# Patient Record
Sex: MALE | Race: WHITE | NOT HISPANIC OR LATINO | Employment: FULL TIME | URBAN - NONMETROPOLITAN AREA
[De-identification: names, ages, dates, MRNs, and addresses within clinical notes are randomized per-mention and may not be internally consistent; named-entity substitution may affect disease eponyms.]

---

## 2017-03-01 ENCOUNTER — OFFICE VISIT (OUTPATIENT)
Dept: FAMILY MEDICINE | Facility: OTHER | Age: 62
End: 2017-03-01
Attending: FAMILY MEDICINE
Payer: COMMERCIAL

## 2017-03-01 VITALS
SYSTOLIC BLOOD PRESSURE: 126 MMHG | TEMPERATURE: 99.4 F | BODY MASS INDEX: 28.35 KG/M2 | OXYGEN SATURATION: 95 % | WEIGHT: 198 LBS | HEART RATE: 76 BPM | RESPIRATION RATE: 16 BRPM | HEIGHT: 70 IN | DIASTOLIC BLOOD PRESSURE: 66 MMHG

## 2017-03-01 DIAGNOSIS — Z87.438 HISTORY OF ACUTE PROSTATITIS: ICD-10-CM

## 2017-03-01 DIAGNOSIS — F43.0 STRESS REACTION: ICD-10-CM

## 2017-03-01 DIAGNOSIS — Z71.89 ACP (ADVANCE CARE PLANNING): Chronic | ICD-10-CM

## 2017-03-01 DIAGNOSIS — Z00.00 ROUTINE GENERAL MEDICAL EXAMINATION AT A HEALTH CARE FACILITY: Primary | ICD-10-CM

## 2017-03-01 LAB
ANION GAP SERPL CALCULATED.3IONS-SCNC: 8 MMOL/L (ref 3–14)
BUN SERPL-MCNC: 19 MG/DL (ref 7–30)
CALCIUM SERPL-MCNC: 8.2 MG/DL (ref 8.5–10.1)
CHLORIDE SERPL-SCNC: 108 MMOL/L (ref 94–109)
CHOLEST SERPL-MCNC: 117 MG/DL
CO2 SERPL-SCNC: 25 MMOL/L (ref 20–32)
CREAT SERPL-MCNC: 1.04 MG/DL (ref 0.66–1.25)
GFR SERPL CREATININE-BSD FRML MDRD: 72 ML/MIN/1.7M2
GLUCOSE SERPL-MCNC: 94 MG/DL (ref 70–99)
HDLC SERPL-MCNC: 44 MG/DL
LDLC SERPL CALC-MCNC: 41 MG/DL
NONHDLC SERPL-MCNC: 73 MG/DL
POTASSIUM SERPL-SCNC: 3.9 MMOL/L (ref 3.4–5.3)
PSA SERPL-MCNC: 0.61 UG/L (ref 0–4)
SODIUM SERPL-SCNC: 141 MMOL/L (ref 133–144)
TRIGL SERPL-MCNC: 162 MG/DL

## 2017-03-01 PROCEDURE — 80061 LIPID PANEL: CPT | Performed by: FAMILY MEDICINE

## 2017-03-01 PROCEDURE — 84153 ASSAY OF PSA TOTAL: CPT | Performed by: FAMILY MEDICINE

## 2017-03-01 PROCEDURE — 99396 PREV VISIT EST AGE 40-64: CPT | Performed by: FAMILY MEDICINE

## 2017-03-01 PROCEDURE — 36415 COLL VENOUS BLD VENIPUNCTURE: CPT | Performed by: FAMILY MEDICINE

## 2017-03-01 PROCEDURE — 80048 BASIC METABOLIC PNL TOTAL CA: CPT | Performed by: FAMILY MEDICINE

## 2017-03-01 ASSESSMENT — PAIN SCALES - GENERAL: PAINLEVEL: NO PAIN (0)

## 2017-03-01 NOTE — MR AVS SNAPSHOT
"              After Visit Summary   3/1/2017    Samy Lu    MRN: [id number]           Patient Information     Date Of Birth          1955        Visit Information        Provider Department      3/1/2017 1:45 PM Reji Flynn MD [facility] ([facility])        Today's Diagnoses     Routine general medical examination at a health care facility    -  1    History of acute prostatitis        Stress reaction        ACP (advance care planning)          Care Instructions    F/u annually.          Follow-ups after your visit        Who to contact     If you have questions or need follow up information about today's clinic visit or your schedule please contact [facility] directly at [phone].  Normal or non-critical lab and imaging results will be communicated to you by MyChart, letter or phone within 4 business days after the clinic has received the results. If you do not hear from us within 7 days, please contact the clinic through MyChart or phone. If you have a critical or abnormal lab result, we will notify you by phone as soon as possible.  Submit refill requests through LookTracker or call your pharmacy and they will forward the refill request to us. Please allow 3 business days for your refill to be completed.          Additional Information About Your Visit        Care EveryWhere ID     This is your Care EveryWhere ID. This could be used by other organizations to access your [place] medical records  [id number]        Your Vitals Were     Pulse Temperature Respirations Height Pulse Oximetry BMI (Body Mass Index)    76 99.4  F (37.4  C) (Tympanic) 16 5' 9.5\" (1.765 m) 95% 28.82 kg/m2       Blood Pressure from Last 3 Encounters:   03/01/17 126/66   08/25/15 111/70   04/08/15 112/66    Weight from Last 3 Encounters:   03/01/17 198 lb (89.8 kg)   08/25/15 197 lb (89.4 kg)   04/08/15 195 lb (88.5 kg)              We Performed the Following     Basic metabolic panel     Lipid Profile " (Chol, Trig, HDL, LDL calc)     PSA Diagnostic        Primary Care Provider Office Phone # Fax #    Arvind Reilly [phone]       [facility] [address] [place] [address]        Thank you!     Thank you for choosing [facility]  for your care. Our goal is always to provide you with excellent care. Hearing back from our patients is one way we can continue to improve our services. Please take a few minutes to complete the written survey that you may receive in the mail after your visit with us. Thank you!             Your Updated Medication List - Protect others around you: Learn how to safely use, store and throw away your medicines at www.disposemymeds.org.          This list is accurate as of: 3/1/17  2:04 PM.  Always use your most recent med list.                   Brand Name Dispense Instructions for use    ZINC PO      Take by mouth as needed

## 2017-03-01 NOTE — LETTER
3/2/2017     Samy Lu  [address] [address]      Dear Samy:    Thank you for allowing me to participate in your care. Your recent test results were reviewed and listed below. Your labs all great!  We should recheck them in a year, except the psa which doesn't need to be checked again.      Your results are provided below for your review  Results for orders placed or performed in visit on 03/01/17   PSA Diagnostic   Result Value Ref [place]    PSA 0.61 0 - 4 ug/L   Lipid Profile (Chol, Trig, HDL, LDL calc)   Result Value Ref [place]    Cholesterol 117 <200 mg/dL    Triglycerides 162 (H) <150 mg/dL    HDL Cholesterol 44 >39 mg/dL    LDL Cholesterol Calculated 41 <100 mg/dL    Non HDL Cholesterol 73 <130 mg/dL   Basic metabolic panel   Result Value Ref [place]    Sodium 141 133 - 144 mmol/L    Potassium 3.9 3.4 - 5.3 mmol/L    Chloride 108 94 - 109 mmol/L    Carbon Dioxide 25 20 - 32 mmol/L    Anion Gap 8 3 - 14 mmol/L    Glucose 94 70 - 99 mg/dL    Urea Nitrogen 19 7 - 30 mg/dL    Creatinine 1.04 0.66 - 1.25 mg/dL    GFR Estimate 72 >60 mL/min/1.7m2    GFR Estimate If Black 88 >60 mL/min/1.7m2    Calcium 8.2 (L) 8.5 - 10.1 mg/dL             Thank you for choosing [facility]. As a result, please continue with the treatment plan discussed in the office. Return as discussed or sooner if symptoms worsens or fail to improve. If you have any further questions or concerns, please do not hesitate to contact us.      Sincerely,    Dr SKYLER Flynn/Sadie TEJEDA [place] [address] [address]  [facility] [address]  Phone: [phone]

## 2017-03-01 NOTE — PROGRESS NOTES
Subjective:  Samy Lu is a 61 year old male who presents for routine cares.  He is having a great deal of stress with work.  See below.  Otherwise no new issues or concerns.      History reviewed. No pertinent past medical history.    Past Surgical History   Procedure Laterality Date     Abdomen surgery       right IHR     Abdomen surgery       2 hernia repairs     Abdomen surgery       Umbilical hernia repair       Family History   Problem Relation Age of Onset     CANCER Father      Non Hodgkins Lymphoma     DIABETES Paternal Grandmother        Social History   Substance Use Topics     Smoking status: Former Smoker     Types: Cigarettes     Smokeless tobacco: Not on file     Alcohol use Not on file       Current Outpatient Prescriptions   Medication     Multiple Vitamins-Minerals (ZINC PO)     No current facility-administered medications for this visit.        No Known Allergies    Review of Systems:  Gen: negative for fever, chills, change in weight  Derm: negative for worrisome rashes, moles or lesions  Eyes: negative for vision changes or irritation  ENT: negative for ear, mouth and throat problems  Resp: negative for significant cough or SOB  Breast: negative for masses, tenderness or discharge  CV: negative for chest pain, palpitations or peripheral edema  GI: negative for nausea, abdominal pain, heartburn, or change in bowel habits  : negative for frequency, dysuria, or hematuria  Musculoskeletal: negative for significant arthralgias or myalgia  Neuro: negative for weakness, dizziness or paresthesias  Endo: negative for temperature intolerance, skin/hair changes  Heme: negative for bleeding problems  Psych: negative for changes in mood or affect    Objective:  B/P: 126/66, T: 99.4, P: 76, R: 16    Physical Exam:  Constitutional: healthy, alert and no distress  Head: Normocephalic. No masses, lesions, tenderness or abnormalities  ENT: ENT exam normal, no neck nodes or sinus tenderness  CV: RRR. No  murmurs, clicks gallops or rub  Pulm: Lungs clear to auscultation b/l, no rhonchi, rales or wheezes.  GI: Abdomen soft, non-tender. BS normal. No masses, organomegaly  : exam deferred.   Musculoskeletal: extremities normal- no gross deformities noted, gait normal and normal muscle tone  Skin: no suspicious lesions or rashes  Neuroc: Gait normal. Reflexes normal and symmetric. Sensation grossly WNL.  Psych: mentation appears normal and affect normal/bright  Heme: normal ant/post cervical, axillary, supraclavicular and inguinal nodes    Labs pending, see chart for results.    Assessment and Plan:  (Z00.00) Routine general medical examination at a health care facility  (primary encounter diagnosis)  Comment: doing well   Plan: Lipid Profile (Chol, Trig, HDL, LDL calc),         Basic metabolic panel        Update annual labs.  No change in cares.      (Z87.438) History of acute prostatitis  Comment: no recurrence of symptoms.   Plan: PSA Diagnostic        Discussed.  Updating one more psa.  He declines rectal exam.      (F43.0) Stress reaction  Comment: with work.   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  He has pain pill and alcohol issues, which is causing a great deal of stress.  Offer meds and counseling to Rocky, but he wants to work on this himself.  Will just wait to hear back from him.    .      Reji Flynn

## 2017-03-01 NOTE — NURSING NOTE
"Chief Complaint   Patient presents with     Physical     *_* Health Care Directive *_*     paperwork given       Initial /66 (BP Location: Right arm, Patient Position: Chair, Cuff Size: Adult Large)  Pulse 76  Temp 99.4  F (37.4  C) (Tympanic)  Resp 16  Ht 5' 9.5\" (1.765 m)  Wt 198 lb (89.8 kg)  SpO2 95%  BMI 28.82 kg/m2 Estimated body mass index is 28.82 kg/(m^2) as calculated from the following:    Height as of this encounter: 5' 9.5\" (1.765 m).    Weight as of this encounter: 198 lb (89.8 kg).  Medication Reconciliation: complete   Lo Ortega LPN      "

## 2017-03-02 ASSESSMENT — PATIENT HEALTH QUESTIONNAIRE - PHQ9: SUM OF ALL RESPONSES TO PHQ QUESTIONS 1-9: 12

## 2017-03-21 ENCOUNTER — OFFICE VISIT (OUTPATIENT)
Dept: FAMILY MEDICINE | Facility: OTHER | Age: 62
End: 2017-03-21
Attending: FAMILY MEDICINE
Payer: COMMERCIAL

## 2017-03-21 VITALS
RESPIRATION RATE: 16 BRPM | OXYGEN SATURATION: 96 % | HEART RATE: 62 BPM | DIASTOLIC BLOOD PRESSURE: 68 MMHG | TEMPERATURE: 97.6 F | BODY MASS INDEX: 28.35 KG/M2 | WEIGHT: 198 LBS | SYSTOLIC BLOOD PRESSURE: 106 MMHG | HEIGHT: 70 IN

## 2017-03-21 DIAGNOSIS — F43.9 STRESS: Primary | ICD-10-CM

## 2017-03-21 PROCEDURE — 99213 OFFICE O/P EST LOW 20 MIN: CPT | Performed by: FAMILY MEDICINE

## 2017-03-21 ASSESSMENT — PAIN SCALES - GENERAL: PAINLEVEL: NO PAIN (0)

## 2017-03-21 NOTE — MR AVS SNAPSHOT
After Visit Summary   3/21/2017    Samy Lu    MRN: [id number]           Patient Information     Date Of Birth          1955        Visit Information        Provider Department      3/21/2017 9:15 AM Reji Flynn MD [facility]        Today's Diagnoses     Stress    -  1      Care Instructions    F/u with ongoing concerns.         Follow-ups after your visit        Additional Services     PSYCHOLOGY REFERRAL       Your provider has referred you to:  [facility].     Please be aware that coverage of these services is subject to the terms and limitations of your health insurance plan.  Call member services at your health plan with any benefit or coverage questions.      Please bring the following to your appointment:    >>   Any x-rays, CTs or MRIs which have been performed.  Contact the facility where they were done to arrange for  prior to your scheduled appointment.   >>   List of current medications   >>   This referral request   >>   Any documents/labs given to you for this referral                  Your next 10 appointments already scheduled     Mar 21, 2017  9:15 AM CDT   (Arrive by 9:00 AM)   SHORT with Reji Flynn MD   [facility] ([facility])    [address] [place] [address]   [phone]              Who to contact     If you have questions or need follow up information about today's clinic visit or your schedule please contact [facility] directly at [phone].  Normal or non-critical lab and imaging results will be communicated to you by MyChart, letter or phone within 4 business days after the clinic has received the results. If you do not hear from us within 7 days, please contact the clinic through MyChart or phone. If you have a critical or abnormal lab result, we will notify you by phone as soon as possible.  Submit refill requests through [email or web address] or call your pharmacy and they will forward  "the refill request to us. Please allow 3 business days for your refill to be completed.          Additional Information About Your Visit        Care EveryWhere ID     This is your Care EveryWhere ID. This could be used by other organizations to access your [place] medical records  [id number]        Your Vitals Were     Pulse Temperature Respirations Height Pulse Oximetry BMI (Body Mass Index)    62 97.6  F (36.4  C) (Tympanic) 16 5' 9.5\" (1.765 m) 96% 28.82 kg/m2       Blood Pressure from Last 3 Encounters:   03/21/17 106/68   03/01/17 126/66   08/25/15 111/70    Weight from Last 3 Encounters:   03/21/17 198 lb (89.8 kg)   03/01/17 198 lb (89.8 kg)   08/25/15 197 lb (89.4 kg)              We Performed the Following     PSYCHOLOGY REFERRAL        Primary Care Provider Office Phone # Fax #    Reji Flynn [phone]       [address] [address]        Thank you!     Thank you for choosing [facility]  for your care. Our goal is always to provide you with excellent care. Hearing back from our patients is one way we can continue to improve our services. Please take a few minutes to complete the written survey that you may receive in the mail after your visit with us. Thank you!             Your Updated Medication List - Protect others around you: Learn how to safely use, store and throw away your medicines at www.disposemymeds.org.          This list is accurate as of: 3/21/17  9:13 AM.  Always use your most recent med list.                   Brand Name Dispense Instructions for use    ZINC PO      Take by mouth as needed         "

## 2017-03-21 NOTE — PROGRESS NOTES
Samy Lu    March 21, 2017    Chief Complaint   Patient presents with     Referral     wants to talk about referral for mental health       SUBJECTIVE:  Here for increased stress.  Now having more bm's with stressful thoughts.  He wants to address the stress.  He is very stressed at work.      History reviewed. No pertinent past medical history.    Past Surgical History   Procedure Laterality Date     Abdomen surgery       right IHR     Abdomen surgery       2 hernia repairs     Abdomen surgery       Umbilical hernia repair       Current Outpatient Prescriptions   Medication Sig Dispense Refill     Multiple Vitamins-Minerals (ZINC PO) Take by mouth as needed         No Known Allergies    Family History   Problem Relation Age of Onset     CANCER Father      Non Hodgkins Lymphoma     DIABETES Paternal Grandmother        Social History     Social History     Marital status:      Spouse name: N/A     Number of children: N/A     Years of education: N/A     Occupational History     Not on file.     Social History Main Topics     Smoking status: Former Smoker     Types: Cigarettes     Smokeless tobacco: Not on file     Alcohol use Not on file     Drug use: Not on file     Sexual activity: Not on file     Other Topics Concern     Parent/Sibling W/ Cabg, Mi Or Angioplasty Before 65f 55m? No     Social History Narrative       5 point ROS negative except as noted above in HPI, including Gen., Resp., CV, GI &  system review.     OBJECTIVE:  B/P: 106/68, T: 97.6, P: 62, R: 16    GENERAL APPEARANCE: Alert, no acute distress  Mood:  Appropriate.   SKIN: no suspicious lesions or rashes to visualized skin  NEURO: Alert, oriented x 3, speech and mentation normal    ASSESSMENT and PLAN:  (F43.9) Stress  (primary encounter diagnosis)  Comment: increase.  Now having some somatic sx.    Plan: PSYCHOLOGY REFERRAL        Discussed.  He does not want meds.  Referral to [facility].  F/u with ongoing concerns.

## 2017-03-21 NOTE — NURSING NOTE
"Chief Complaint   Patient presents with     Referral     wants to talk about referral for mental health       Initial /68 (BP Location: Left arm, Patient Position: Chair, Cuff Size: Adult Large)  Pulse 62  Temp 97.6  F (36.4  C) (Tympanic)  Resp 16  Ht 5' 9.5\" (1.765 m)  Wt 198 lb (89.8 kg)  SpO2 96%  BMI 28.82 kg/m2 Estimated body mass index is 28.82 kg/(m^2) as calculated from the following:    Height as of this encounter: 5' 9.5\" (1.765 m).    Weight as of this encounter: 198 lb (89.8 kg).  Medication Reconciliation: complete   Lo Ortega LPN      "

## 2017-03-23 ENCOUNTER — TELEPHONE (OUTPATIENT)
Dept: FAMILY MEDICINE | Facility: OTHER | Age: 62
End: 2017-03-23

## 2017-03-23 NOTE — TELEPHONE ENCOUNTER
Pt calls [facility] is not in his network. He called his insurance they will cover Tony Bates and Trever Caruso. Have you any recommendation/preference for these providers?

## 2017-07-12 ENCOUNTER — OFFICE VISIT (OUTPATIENT)
Dept: FAMILY MEDICINE | Facility: OTHER | Age: 62
End: 2017-07-12
Attending: FAMILY MEDICINE
Payer: OTHER MISCELLANEOUS

## 2017-07-12 VITALS
HEIGHT: 70 IN | HEART RATE: 68 BPM | SYSTOLIC BLOOD PRESSURE: 122 MMHG | BODY MASS INDEX: 28.63 KG/M2 | TEMPERATURE: 96.6 F | OXYGEN SATURATION: 96 % | RESPIRATION RATE: 18 BRPM | DIASTOLIC BLOOD PRESSURE: 70 MMHG | WEIGHT: 200 LBS

## 2017-07-12 DIAGNOSIS — S39.012A STRAIN OF LUMBAR REGION, INITIAL ENCOUNTER: Primary | ICD-10-CM

## 2017-07-12 PROCEDURE — 99213 OFFICE O/P EST LOW 20 MIN: CPT | Performed by: FAMILY MEDICINE

## 2017-07-12 PROCEDURE — 72100 X-RAY EXAM L-S SPINE 2/3 VWS: CPT | Mod: TC | Performed by: RADIOLOGY

## 2017-07-12 ASSESSMENT — PAIN SCALES - GENERAL: PAINLEVEL: NO PAIN (0)

## 2017-07-12 NOTE — NURSING NOTE
"Chief Complaint   Patient presents with     Back Pain     work comp, date of injury 5/16/2017       Initial /70 (BP Location: Left arm, Patient Position: Chair, Cuff Size: Adult Regular)  Pulse 68  Temp 96.6  F (35.9  C) (Tympanic)  Resp 18  Ht 5' 9.5\" (1.765 m)  Wt 200 lb (90.7 kg)  SpO2 96%  BMI 29.11 kg/m2 Estimated body mass index is 29.11 kg/(m^2) as calculated from the following:    Height as of this encounter: 5' 9.5\" (1.765 m).    Weight as of this encounter: 200 lb (90.7 kg).  Medication Reconciliation: phil Lomas    "

## 2017-07-12 NOTE — PROGRESS NOTES
Occupational Visit     Subjective:  Samy Lu, 62 year old, male is seen today for work comp injury at work, low back.  The date of injury is May 16, 2017 he is fairly sure.  The patient is employed at [employer].  Patient was holding some steel parts in his hands.  There was a roller cart on the floor.  He opened the truck door and was going to throw the steel into the passenger side.  His foot caught the roller cart.  He fell forward but was able to catch himself and not fall or drop the steel, hurting his low back in the meantime.  He never missed any work.  His back has hurt the whole time but has progressively gotten worse.  He works as a manager, and they are so busy that he never felt he could take any time off but now the pain warrants him to have looked at.        No Known Allergies      Review of Systems:  No bowel or bladder dysfunction.  Gets some numbness down the left thigh for some time and now down the right thigh as well.  Nothing below the knee      OBJECTIVE:  Vitals: B/P: 122/70, T: 96.6, P: 68, R: 18      Exam:  DTR normal bilaterally in the patellar.  Strength in the dorsar plantar flexion is normal bilaterally.  Tender just to the left of the spine about about L4/5.       Labs:xray low back.       ASSESSMENT/PLAN:    (S39.012A) Strain of lumbar region, initial encounter  (primary encounter diagnosis)  Comment: reviewed.   Plan: XR LUMBAR SPINE 2/3 VIEWS (Clinic Performed),         PHYSICAL THERAPY REFERRAL        Discussed.  No red flags right now.  PT, xray given more than 6 weeks of symptoms.  F/u 4 weeks.  Light duty until then.

## 2017-07-12 NOTE — MR AVS SNAPSHOT
"              After Visit Summary   7/12/2017    Samy Lu    MRN: [id number]           Patient Information     Date Of Birth          1955        Visit Information        Provider Department      7/12/2017 10:00 AM Reji Flynn MD [facility]        Today's Diagnoses     Strain of lumbar region, initial encounter    -  1      Care Instructions    F/u with ongoing concerns.           Follow-ups after your visit        Additional Services     PHYSICAL THERAPY REFERRAL       *This therapy referral will be filtered to a centralized scheduling office at [facility] and the patient will receive a call to schedule an appointment at a [place] location most convenient for them. *     [facility] provides Physical Therapy evaluation and treatment and many specialty services across the [place] system.  If requesting a specialty program, please choose from the list below.    If you have not heard from the scheduling office within 2 business days, please call [phone] for all locations, with the exception of [place], please call [phone].  Treatment: Evaluation & Treatment  Special Instructions/Modalities: none  Special Programs: none    Please be aware that coverage of these services is subject to the terms and limitations of your health insurance plan.  Call member services at your health plan with any benefit or coverage questions.      **Note to Provider:  If you are referring outside of [place] for the therapy appointment, please list the name of the location in the \"special instructions\" above, print the referral and give to the patient to schedule the appointment.                  Who to contact     If you have questions or need follow up information about today's clinic visit or your schedule please contact [facility] directly at [phone].  Normal or non-critical lab and imaging results will be communicated to you by " "MyChart, letter or phone within 4 business days after the clinic has received the results. If you do not hear from us within 7 days, please contact the clinic through Best Teacher[employer] or phone. If you have a critical or abnormal lab result, we will notify you by phone as soon as possible.  Submit refill requests through [employer] ([employer]) or call your pharmacy and they will forward the refill request to us. Please allow 3 business days for your refill to be completed.          Additional Information About Your Visit        Best TeacherharRezzcard Information     [employer] ([employer]) lets you send messages to your doctor, view your test results, renew your prescriptions, schedule appointments and more. To sign up, go to [email or web address] ([email or web address]) . Click on \"Log in\" on the left side of the screen, which will take you to the Welcome page. Then click on \"Sign up Now\" on the right side of the page.     You will be asked to enter the access code listed below, as well as some personal information. Please follow the directions to create your username and password.     Your access code is: [email or web address]  Expires: 10/10/2017 12:37 PM     Your access code will  in 90 days. If you need help or a new code, please call your [place] clinic or [phone].        Care EveryWhere ID     This is your Care EveryWhere ID. This could be used by other organizations to access your [place] medical records  [id number]        Your Vitals Were     Pulse Temperature Respirations Height Pulse Oximetry BMI (Body Mass Index)    68 96.6  F (35.9  C) (Tympanic) 18 5' 9.5\" (1.765 m) 96% 29.11 kg/m2       Blood Pressure from Last 3 Encounters:   17 122/70   17 106/68   17 126/66    Weight from Last 3 Encounters:   17 200 lb (90.7 kg)   17 198 lb (89.8 kg)   17 198 lb (89.8 kg)              We Performed the Following     PHYSICAL THERAPY REFERRAL     XR LUMBAR SPINE 2/3 VIEWS (Clinic Performed)        Primary Care Provider Office Phone # Fax #    Reji DALEY" MD [facility] [phone]        [facility] [address]  [facility] [address]        Equal Access to Services     YESSENIA LINDQUIST : Fior Sue, francisco vargas, johnna kaalmada robinmauricioino, nicho muñoz shayleeburke pardolora whitman albert glasgow. So [facility] [phone].    ATENCIÓN: Si habla español, tiene a pickering disposición servicios gratuitos de asistencia lingüística. Llame al [phone].    We comply with applicable federal civil rights laws and [place] laws. We do not discriminate on the basis of race, color, national origin, age, disability sex, sexual orientation or gender identity.            Thank you!     Thank you for choosing [facility]  for your care. Our goal is always to provide you with excellent care. Hearing back from our patients is one way we can continue to improve our services. Please take a few minutes to complete the written survey that you may receive in the mail after your visit with us. Thank you!             Your Updated Medication List - Protect others around you: Learn how to safely use, store and throw away your medicines at www.disposemymeds.org.          This list is accurate as of: 7/12/17 12:37 PM.  Always use your most recent med list.                   Brand Name Dispense Instructions for use Diagnosis    ZINC PO      Take by mouth as needed

## 2017-07-31 ENCOUNTER — HOSPITAL ENCOUNTER (OUTPATIENT)
Dept: PHYSICAL THERAPY | Facility: HOSPITAL | Age: 62
Setting detail: THERAPIES SERIES
End: 2017-07-31
Attending: FAMILY MEDICINE
Payer: OTHER MISCELLANEOUS

## 2017-07-31 PROCEDURE — 97140 MANUAL THERAPY 1/> REGIONS: CPT | Mod: GP | Performed by: PHYSICAL THERAPIST

## 2017-07-31 PROCEDURE — 97161 PT EVAL LOW COMPLEX 20 MIN: CPT | Mod: GP | Performed by: PHYSICAL THERAPIST

## 2017-07-31 NOTE — PROGRESS NOTES
07/31/17 0800   General Information   Type of Visit Initial OP Ortho PT Evaluation   Start of Care Date 07/31/17   Referring Physician Dr Flynn   Patient/Family Goals Statement return to normal work actvitities   Orders Evaluate and Treat   Date of Order 07/12/17   Insurance Type ()   Medical Diagnosis Back strain   Surgical/Medical history reviewed Yes   Precautions/Limitations no known precautions/limitations   General Information Comments Currently light duty   Body Part(s)   Body Part(s) Lumbar Spine/SI   Presentation and Etiology   Pertinent history of current problem (include personal factors and/or comorbidities that impact the POC) Patient injured his back at work on 5/16/2017 after his foot caught under a roller and he stumbled while carrying steel. He did not drop the steel, but noticed onset of pain in his lower back that has failed to go away the past 2 months. States he has pain into his L leg to his knee. He has a hard time sitting for a long period of time   Impairments A. Pain;D. Decreased ROM;E. Decreased flexibility;F. Decreased strength and endurance;H. Impaired gait   Functional Limitations perform activities of daily living;perform required work activities   Symptom Location LB and L leg   How/Where did it occur At work   Onset date of current episode/exacerbation 07/12/17   Chronicity New   Pain rating (0-10 point scale) Best (/10);Worst (/10)   Best (/10) 5   Worst (/10) 7   Pain quality C. Aching;D. Burning   Frequency of pain/symptoms B. Intermittent   Pain/symptoms are: Worse during the day   Pain/symptoms exacerbated by A. Sitting;C. Lifting;D. Carrying;K. Home tasks;L. Work tasks   Pain/symptoms eased by B. Walking;C. Rest;E. Changing positions   Progression of symptoms since onset: Unchanged   Current / Previous Interventions   Diagnostic Tests: (None)   Prior Level of Function   Prior Level of Function-Mobility IND   Prior Level of Function-ADLs IND   Current Level of Function  "  Patient role/employment history A. Employed   Employment Comments Currently light duty   Living environment House[facility]   Fall Risk Screen   Fall screen completed by PT   Per patient - Fall 2 or more times in past year? Yes   Per patient - Fall with injury in past year? No   Is patient a fall risk? No   System Outcome Measures   Outcome Measures Low Back Pain (see Oswestry and Jena)   Lumbar Spine/SI Objective Findings   Observation slight shift to R   Integumentary WNL   Posture Rounded shoulders   Gait/Locomotion slightly antalgic, reports pain in L leg   Flexion ROM knees + pull   Extension ROM mod conte + pinch in L LB   Right Side Bending ROM min conte   Left Side Bending ROM min conte   Repeated Extension-Standing ROM incr motion, decr leg pain   Repeated Flexion-Standing ROM no change   Pelvic Screen L leg shorter by 1/4\", PSIS higher, R SIJ locked   Transversus Abdominus Strength (Rohit Leg Lowering-deg) 3/5   Hip Flexion (L2) Strength 5/5   Hip Abduction Strength 4/5   Hip Adduction Strength 5/5   Hip Extension Strength 4/5   Knee Flexion Strength 5/5   Knee Extension (L3) Strength 5/5   Ankle Dorsiflexion (L4) Strength 5/5   Great Toe Extension (L5) Strength 5   Ankle Plantar Flexion (S1) Strength 5/5   Hamstring Flexibility hypo   Hip Flexor Flexibility hypo   Piriformis Flexibility hypo ++ pain   SLR + on L   Repeated Extension Prone Incr motion, decr leg pain with prone lying to small press up   Segmental Mobility hypo L3-S1, L SIJ   Sensation Testing WNL   Patellar Tendon Reflexes  nt   Achilles Tendon Reflexes nt   Palpation TTP lumbar paraspinals L>R, L piriformis, L QL   Planned Therapy Interventions   Planned Therapy Interventions joint mobilization;manual therapy;neuromuscular re-education;ROM;strengthening;stretching   Planned Modality Interventions   Planned Modality Interventions Comments as needed   Clinical Impression   Criteria for Skilled Therapeutic Interventions Met yes, treatment " indicated   PT Diagnosis L LBP with leg pain   Influenced by the following impairments pain and hypomobility   Functional limitations due to impairments difficulty working and performing ADLs   Clinical Presentation Stable/Uncomplicated   Clinical Presentation Rationale due to lack of additional comorbidities that may impact anticipated PT progress   Clinical Decision Making (Complexity) Low complexity   Therapy Frequency 1 time/week   Predicted Duration of Therapy Intervention (days/wks) up to 6 visits   Risk & Benefits of therapy have been explained Yes   Patient, Family & other staff in agreement with plan of care Yes   Clinical Impression Comments Presentation is consistent with L LBP with leg pain that is expected to resolve with skilled PT intervention   Education Assessment   Preferred Learning Style Demonstration   Barriers to Learning No barriers   ORTHO GOALS   PT Ortho Eval Goals 1;2;3   Ortho Goal 1   Goal Identifier STG 1   Goal Description Patient will report decreased worst LBP to 5/10 or less in order to improve ability to perform daily tasks   Target Date 08/21/17   Ortho Goal 2   Goal Identifier LTG 1   Goal Description Patient will report decreased worst PL in L LB to 3/10 or less in order to perform work tasks without difficultty   Target Date 09/11/17   Ortho Goal 3   Goal Identifier LTG 2   Goal Description Patient will improve MONIQUE score by 6 points in order to demonstrate meaningful improvement in daily tasks   Target Date 09/11/17   Total Evaluation Time   Total Evaluation Time 20 eval, 25 treat

## 2017-08-07 ENCOUNTER — HOSPITAL ENCOUNTER (OUTPATIENT)
Dept: PHYSICAL THERAPY | Facility: HOSPITAL | Age: 62
Setting detail: THERAPIES SERIES
End: 2017-08-07
Attending: FAMILY MEDICINE
Payer: OTHER MISCELLANEOUS

## 2017-08-07 PROCEDURE — 97140 MANUAL THERAPY 1/> REGIONS: CPT | Mod: GP | Performed by: PHYSICAL THERAPIST

## 2017-08-22 ENCOUNTER — HOSPITAL ENCOUNTER (OUTPATIENT)
Dept: PHYSICAL THERAPY | Facility: HOSPITAL | Age: 62
Setting detail: THERAPIES SERIES
End: 2017-08-22
Attending: FAMILY MEDICINE
Payer: OTHER MISCELLANEOUS

## 2017-08-22 PROCEDURE — 97110 THERAPEUTIC EXERCISES: CPT | Mod: GP | Performed by: PHYSICAL THERAPIST

## 2017-08-22 PROCEDURE — 97140 MANUAL THERAPY 1/> REGIONS: CPT | Mod: GP | Performed by: PHYSICAL THERAPIST

## 2017-08-28 ENCOUNTER — HOSPITAL ENCOUNTER (OUTPATIENT)
Dept: PHYSICAL THERAPY | Facility: HOSPITAL | Age: 62
Setting detail: THERAPIES SERIES
End: 2017-08-28
Attending: FAMILY MEDICINE
Payer: OTHER MISCELLANEOUS

## 2017-08-28 PROCEDURE — 97140 MANUAL THERAPY 1/> REGIONS: CPT | Mod: GP | Performed by: PHYSICAL THERAPIST

## 2017-08-28 NOTE — PROGRESS NOTES
Outpatient Physical Therapy Discharge Note     Patient: Samy Lu  : 1955    Beginning/End Dates of Reporting Period:  2017 to 2017    Referring Provider: Dr Flynn    Therapy Diagnosis: Back pain with leg pain     Client Self Report: Patient was seen from 8:40-9am.  Reports he feels overall 75-80% improved since starting PT. He no longer has pain in his leg and his back pain is mild. He did not get his exercises in as much as he should have this past week because he went camping, but states he continues to feel much better compared to when he initially started stretching and exercising his back.  He feels ready to continue his HEP on his own and will return to PT if he fails to continue to improve    Objective Measurements:  Objective Measure: MONIQUE and Jena scores  Details: Improved MONIQUE and Jena scores.  Trunk ROM has improved by 60% with only minimal limit into EXT at this point. Patient has been set up with a stretching and stregnthening program to continue going forward to continue his prgoress and prevent future back injury.                                      Outcome Measures (most recent score):  Jena STarT Sub-Score (Q5-9): 2  Jena STarT Total Score (all 9): 3  Oswestry Score (%): 22 %      Goals:  Goal Identifier STG 1   Goal Description Patient will report decreased worst LBP to 5/10 or less in order to improve ability to perform daily tasks   Target Date 17   Date Met  17   Progress:     Goal Identifier LTG 1   Goal Description Patient will report decreased worst PL in L LB to 3/10 or less in order to perform work tasks without difficultty   Target Date 17   Date Met  17   Progress:     Goal Identifier LTG 2   Goal Description Patient will improve MONIQUE score by 6 points in order to demonstrate meaningful improvement in daily tasks   Target Date 17   Date Met  17   Progress:         Progress Toward Goals:   Progress this reporting period:  Patient has met goals and is ready to DC      Plan:  Discharge from therapy.    Discharge:    Reason for Discharge: Patient has met all goals.    Equipment Issued: None    Discharge Plan: Patient to continue home program.

## 2017-09-22 ENCOUNTER — TELEPHONE (OUTPATIENT)
Dept: FAMILY MEDICINE | Facility: OTHER | Age: 62
End: 2017-09-22

## 2017-10-11 ENCOUNTER — OFFICE VISIT (OUTPATIENT)
Dept: FAMILY MEDICINE | Facility: OTHER | Age: 62
End: 2017-10-11
Attending: FAMILY MEDICINE
Payer: OTHER MISCELLANEOUS

## 2017-10-11 VITALS
BODY MASS INDEX: 29.03 KG/M2 | TEMPERATURE: 98 F | HEIGHT: 70 IN | SYSTOLIC BLOOD PRESSURE: 96 MMHG | WEIGHT: 202.8 LBS | DIASTOLIC BLOOD PRESSURE: 69 MMHG | HEART RATE: 88 BPM | OXYGEN SATURATION: 95 % | RESPIRATION RATE: 16 BRPM

## 2017-10-11 DIAGNOSIS — S33.5XXD LUMBAR SPRAIN, SUBSEQUENT ENCOUNTER: Primary | ICD-10-CM

## 2017-10-11 PROCEDURE — 99213 OFFICE O/P EST LOW 20 MIN: CPT | Performed by: FAMILY MEDICINE

## 2017-10-11 ASSESSMENT — PAIN SCALES - GENERAL: PAINLEVEL: SEVERE PAIN (7)

## 2017-10-11 NOTE — PROGRESS NOTES
"Occupational Visit     Subjective:  Samy Lu, 62 year old, male is seen for a follow up on his work comp back injury. The date of injury is 05/11/17.  The patient is employed at [employer]. Pain level varies. Pain can be up to a 6-7/10. Pt has numbness in his right thigh. Pt has pain in right buttock also.  He has severe pain at times.  He is working full duty and would just like to continue despite the pain.  No red flag sx.  He has some periods of less pain.       No Known Allergies      Review of Systems:  No bowel or bladder dysfunction.       OBJECTIVE:  Vitals: BP 96/69 (BP Location: Right arm, Patient Position: Chair, Cuff Size: Adult Regular)  Pulse 88  Temp 98  F (36.7  C) (Tympanic)  Resp 16  Ht 5' 9.5\" (1.765 m)  Wt 202 lb 12.8 oz (92 kg)  SpO2 95%  BMI 29.52 kg/m2        Exam:  Gait with a slight limp.        Labs:        ASSESSMENT/PLAN:    (S33.5XXD) Lumbar sprain, subsequent encounter  (primary encounter diagnosis)  Comment: some radiculopathy features which are stable.   Plan: we are just going to keep working here, but he has not met MMI yet.  12 week f/u and f/u with ongoing concerns otherwise.  Full duty in the meantime and he will report changes.  .         "

## 2017-10-11 NOTE — NURSING NOTE
"Chief Complaint   Patient presents with     Work Comp       Initial BP 96/69 (BP Location: Right arm, Patient Position: Chair, Cuff Size: Adult Regular)  Pulse 88  Temp 98  F (36.7  C) (Tympanic)  Resp 16  Ht 5' 9.5\" (1.765 m)  Wt 202 lb 12.8 oz (92 kg)  SpO2 95%  BMI 29.52 kg/m2 Estimated body mass index is 29.52 kg/(m^2) as calculated from the following:    Height as of this encounter: 5' 9.5\" (1.765 m).    Weight as of this encounter: 202 lb 12.8 oz (92 kg).  Medication Reconciliation: complete   Supriya York    "

## 2017-10-11 NOTE — MR AVS SNAPSHOT
"              After Visit Summary   10/11/2017    Samy Lu    MRN: [id number]           Patient Information     Date Of Birth          1955        Visit Information        Provider Department      10/11/2017 9:45 AM Reji Flynn MD [facility]        Today's Diagnoses     Lumbar sprain, subsequent encounter    -  1      Care Instructions    F/u with ongoing concerns.           Follow-ups after your visit        Who to contact     If you have questions or need follow up information about today's clinic visit or your schedule please contact [facility] directly at [phone].  Normal or non-critical lab and imaging results will be communicated to you by Blueseedhart, letter or phone within 4 business days after the clinic has received the results. If you do not hear from us within 7 days, please contact the clinic through Blueseedhart or phone. If you have a critical or abnormal lab result, we will notify you by phone as soon as possible.  Submit refill requests through I-lighting or call your pharmacy and they will forward the refill request to us. Please allow 3 business days for your refill to be completed.          Additional Information About Your Visit        MyChart Information     I-lighting lets you send messages to your doctor, view your test results, renew your prescriptions, schedule appointments and more. To sign up, go to [email or web address] . Click on \"Log in\" on the left side of the screen, which will take you to the Welcome page. Then click on \"Sign up Now\" on the right side of the page.     You will be asked to enter the access code listed below, as well as some personal information. Please follow the directions to create your username and password.     Your access code is: [email or web address]  Expires: 2018 10:03 AM     Your access code will  in 90 days. If you need help or a new code, please call your [facility] or [phone].        Care EveryWhere " "ID     This is your Care EveryWhere ID. This could be used by other organizations to access your [facility] medical records  [id number]        Your Vitals Were     Pulse Temperature Respirations Height Pulse Oximetry BMI (Body Mass Index)    88 98  F (36.7  C) (Tympanic) 16 5' 9.5\" (1.765 m) 95% 29.52 kg/m2       Blood Pressure from Last 3 Encounters:   10/11/17 96/69   07/12/17 122/70   03/21/17 106/68    Weight from Last 3 Encounters:   10/11/17 202 lb 12.8 oz (92 kg)   07/12/17 200 lb (90.7 kg)   03/21/17 198 lb (89.8 kg)              Today, you had the following     No orders found for display       Primary Care Provider Office Phone # Fax #    Reji Flynn [phone]       [facility] [address]  [facility] [place] [address]        Equal Access to Services     SI[facility]RA [facility] : Hadii aad ku hadasho Soomaali, waaxda luqadaha, qaybta kaalmada adeegyada, waxay idiin hayaan adeeg antonette la'beckyn . So [facility] [phone].    ATENCIÓN: Si habla español, tiene a pickering disposición servicios gratuitos de asistencia lingüística. Llame al [phone].    We comply with applicable federal civil rights laws and [place] laws. We do not discriminate on the basis of race, color, national origin, age, disability, sex, sexual orientation, or gender identity.            Thank you!     Thank you for choosing [facility]  for your care. Our goal is always to provide you with excellent care. Hearing back from our patients is one way we can continue to improve our services. Please take a few minutes to complete the written survey that you may receive in the mail after your visit with us. Thank you!             Your Updated Medication List - Protect others around you: Learn how to safely use, store and throw away your medicines at www.disposemymeds.org.          This list is accurate as of: 10/11/17 10:03 AM.  Always use your most recent med list.                   Brand Name Dispense Instructions " for use Diagnosis    ZINC PO      Take by mouth as needed

## 2017-12-17 ENCOUNTER — HEALTH MAINTENANCE LETTER (OUTPATIENT)
Age: 62
End: 2017-12-17

## 2018-01-11 ENCOUNTER — OFFICE VISIT (OUTPATIENT)
Dept: FAMILY MEDICINE | Facility: OTHER | Age: 63
End: 2018-01-11
Attending: FAMILY MEDICINE
Payer: OTHER MISCELLANEOUS

## 2018-01-11 VITALS
HEIGHT: 70 IN | HEART RATE: 63 BPM | SYSTOLIC BLOOD PRESSURE: 90 MMHG | DIASTOLIC BLOOD PRESSURE: 62 MMHG | OXYGEN SATURATION: 96 % | BODY MASS INDEX: 29.2 KG/M2 | WEIGHT: 204 LBS | TEMPERATURE: 98.5 F | RESPIRATION RATE: 16 BRPM

## 2018-01-11 DIAGNOSIS — M54.16 LUMBAR RADICULOPATHY: Primary | ICD-10-CM

## 2018-01-11 PROCEDURE — 99213 OFFICE O/P EST LOW 20 MIN: CPT | Performed by: FAMILY MEDICINE

## 2018-01-11 ASSESSMENT — ANXIETY QUESTIONNAIRES
6. BECOMING EASILY ANNOYED OR IRRITABLE: NOT AT ALL
7. FEELING AFRAID AS IF SOMETHING AWFUL MIGHT HAPPEN: NOT AT ALL
IF YOU CHECKED OFF ANY PROBLEMS ON THIS QUESTIONNAIRE, HOW DIFFICULT HAVE THESE PROBLEMS MADE IT FOR YOU TO DO YOUR WORK, TAKE CARE OF THINGS AT HOME, OR GET ALONG WITH OTHER PEOPLE: NOT DIFFICULT AT ALL
3. WORRYING TOO MUCH ABOUT DIFFERENT THINGS: NOT AT ALL
GAD7 TOTAL SCORE: 0
1. FEELING NERVOUS, ANXIOUS, OR ON EDGE: NOT AT ALL
5. BEING SO RESTLESS THAT IT IS HARD TO SIT STILL: NOT AT ALL
2. NOT BEING ABLE TO STOP OR CONTROL WORRYING: NOT AT ALL

## 2018-01-11 ASSESSMENT — PATIENT HEALTH QUESTIONNAIRE - PHQ9
SUM OF ALL RESPONSES TO PHQ QUESTIONS 1-9: 0
5. POOR APPETITE OR OVEREATING: NOT AT ALL

## 2018-01-11 ASSESSMENT — PAIN SCALES - GENERAL: PAINLEVEL: SEVERE PAIN (6)

## 2018-01-11 NOTE — NURSING NOTE
"Chief Complaint   Patient presents with     Work Comp       Initial BP 90/62 (BP Location: Left arm, Patient Position: Chair, Cuff Size: Adult Regular)  Pulse 63  Temp 98.5  F (36.9  C) (Tympanic)  Resp 16  Ht 5' 9.5\" (1.765 m)  Wt 204 lb (92.5 kg)  SpO2 96%  BMI 29.69 kg/m2 Estimated body mass index is 29.69 kg/(m^2) as calculated from the following:    Height as of this encounter: 5' 9.5\" (1.765 m).    Weight as of this encounter: 204 lb (92.5 kg).  Medication Reconciliation: complete   Supriya York    "

## 2018-01-11 NOTE — PROGRESS NOTES
"Occupational Visit     Subjective:  Samy Lu, 62 year old, male is seen today for a follow up on his work comp back injury.  Pt has intermittent pain and numbness in his right thigh. Pt has pain and numbness in his left buttock and thigh intermittently. The date of injury is 05/11/17.  The patient is employed at [employer].      No Known Allergies      Review of Systems:  Stable.  No bowel or bladder dysfunction      OBJECTIVE:  Vitals: BP 90/62 (BP Location: Left arm, Patient Position: Chair, Cuff Size: Adult Regular)  Pulse 63  Temp 98.5  F (36.9  C) (Tympanic)  Resp 16  Ht 5' 9.5\" (1.765 m)  Wt 204 lb (92.5 kg)  SpO2 96%  BMI 29.69 kg/m2        Exam:  Gait is stable.  Tender over the left lower lumbar muscles. No weakness is noted.       Labs:MRI ordered.       ASSESSMENT/PLAN:    (M54.16) Lumbar radiculopathy  (primary encounter diagnosis)  Comment: reviewed.   Plan: MR Lumbar Spine w/o Contrast        He has had this for many months now.  Gets the radiculopathy.  Consider shots or other interventions.  Asking for MRI at this point given length of time and ongoing radiculopathy sx.          "

## 2018-01-11 NOTE — MR AVS SNAPSHOT
"              After Visit Summary   1/11/2018    Samy Lu    MRN: [id number]           Patient Information     Date Of Birth          1955        Visit Information        Provider Department      1/11/2018 9:45 AM Reji Flynn MD [facility]        Today's Diagnoses     Lumbar radiculopathy    -  1      Care Instructions    F/u with ongoing concerns.           Follow-ups after your visit        Future tests that were ordered for you today     Open Future Orders        Priority Expected Expires Ordered    MR Lumbar Spine w/o Contrast Routine  1/11/2019 1/11/2018            Who to contact     If you have questions or need follow up information about today's clinic visit or your schedule please contact [facility] directly at [phone].  Normal or non-critical lab and imaging results will be communicated to you by SafeNethart, letter or phone within 4 business days after the clinic has received the results. If you do not hear from us within 7 days, please contact the clinic through SafeNethart or phone. If you have a critical or abnormal lab result, we will notify you by phone as soon as possible.  Submit refill requests through DreamFunded or call your pharmacy and they will forward the refill request to us. Please allow 3 business days for your refill to be completed.          Additional Information About Your Visit        SafeNetharLoad DynamiX Information     DreamFunded lets you send messages to your doctor, view your test results, renew your prescriptions, schedule appointments and more. To sign up, go to [email or web address] . Click on \"Log in\" on the left side of the screen, which will take you to the Welcome page. Then click on \"Sign up Now\" on the right side of the page.     You will be asked to enter the access code listed below, as well as some personal information. Please follow the directions to create your username and password.     Your access code is: [email or web address]  Expires: " "2018 10:04 AM     Your access code will  in 90 days. If you need help or a new code, please call your [facility] or [phone].        Care EveryWhere ID     This is your Care EveryWhere ID. This could be used by other organizations to access your [place] medical records  [id number]        Your Vitals Were     Pulse Temperature Respirations Height Pulse Oximetry BMI (Body Mass Index)    63 98.5  F (36.9  C) (Tympanic) 16 5' 9.5\" (1.765 m) 96% 29.69 kg/m2       Blood Pressure from Last 3 Encounters:   18 90/62   10/11/17 96/69   17 122/70    Weight from Last 3 Encounters:   18 204 lb (92.5 kg)   10/11/17 202 lb 12.8 oz (92 kg)   17 200 lb (90.7 kg)               Primary Care Provider Office Phone # Fax #    Reji Flynn [phone]       [address]  [facility] [place] [address]        Equal Access to Services     SIERRA LINDQUIST AH: Hadii aad ku hadasho Soomaali, waaxda luqadaha, qaybta kaalmada adeegyada, nicho muñoz haybeckyn robin dialloararonal price . So [facility] [phone].    ATENCIÓN: Si habla español, tiene a pickering disposición servicios gratuitos de asistencia lingüística. LlThe [facility] [phone].    We comply with applicable federal civil rights laws and [place] laws. We do not discriminate on the basis of race, color, national origin, age, disability, sex, sexual orientation, or gender identity.            Thank you!     Thank you for choosing [facility]  for your care. Our goal is always to provide you with excellent care. Hearing back from our patients is one way we can continue to improve our services. Please take a few minutes to complete the written survey that you may receive in the mail after your visit with us. Thank you!             Your Updated Medication List - Protect others around you: Learn how to safely use, store and throw away your medicines at www.ChemiSenseemSelectMindseds.org.          This list is accurate as of: " 1/11/18 10:04 AM.  Always use your most recent med list.                   Brand Name Dispense Instructions for use Diagnosis    ZINC PO      Take by mouth as needed

## 2018-01-12 ASSESSMENT — ANXIETY QUESTIONNAIRES: GAD7 TOTAL SCORE: 0

## 2018-01-24 ENCOUNTER — HOSPITAL ENCOUNTER (OUTPATIENT)
Dept: MRI IMAGING | Facility: HOSPITAL | Age: 63
Discharge: HOME | End: 2018-01-24
Attending: FAMILY MEDICINE | Admitting: FAMILY MEDICINE
Payer: OTHER MISCELLANEOUS

## 2018-01-24 DIAGNOSIS — M54.16 LUMBAR RADICULOPATHY: ICD-10-CM

## 2018-01-24 PROCEDURE — 72148 MRI LUMBAR SPINE W/O DYE: CPT | Mod: TC

## 2018-09-06 ENCOUNTER — HOSPITAL ENCOUNTER (EMERGENCY)
Facility: HOSPITAL | Age: 63
Discharge: HOME | End: 2018-09-06
Attending: EMERGENCY MEDICINE | Admitting: EMERGENCY MEDICINE
Payer: COMMERCIAL

## 2018-09-06 ENCOUNTER — APPOINTMENT (OUTPATIENT)
Dept: CT IMAGING | Facility: HOSPITAL | Age: 63
End: 2018-09-06
Attending: EMERGENCY MEDICINE
Payer: COMMERCIAL

## 2018-09-06 VITALS
SYSTOLIC BLOOD PRESSURE: 141 MMHG | DIASTOLIC BLOOD PRESSURE: 98 MMHG | TEMPERATURE: 98.2 F | HEART RATE: 57 BPM | OXYGEN SATURATION: 98 % | RESPIRATION RATE: 16 BRPM

## 2018-09-06 DIAGNOSIS — R10.32 ABDOMINAL PAIN, LEFT LOWER QUADRANT: ICD-10-CM

## 2018-09-06 DIAGNOSIS — K52.9 COLITIS: Primary | ICD-10-CM

## 2018-09-06 LAB
ALBUMIN SERPL-MCNC: 4 G/DL (ref 3.4–5)
ALBUMIN UR-MCNC: 10 MG/DL
ALP SERPL-CCNC: 43 U/L (ref 40–150)
ALT SERPL W P-5'-P-CCNC: 34 U/L (ref 0–70)
ANION GAP SERPL CALCULATED.3IONS-SCNC: 9 MMOL/L (ref 3–14)
APPEARANCE UR: CLEAR
AST SERPL W P-5'-P-CCNC: 14 U/L (ref 0–45)
BACTERIA #/AREA URNS HPF: ABNORMAL /HPF
BASOPHILS # BLD AUTO: 0.1 10E9/L (ref 0–0.2)
BASOPHILS NFR BLD AUTO: 0.5 %
BILIRUB SERPL-MCNC: 0.7 MG/DL (ref 0.2–1.3)
BILIRUB UR QL STRIP: NEGATIVE
BUN SERPL-MCNC: 14 MG/DL (ref 7–30)
CALCIUM SERPL-MCNC: 8.7 MG/DL (ref 8.5–10.1)
CHLORIDE SERPL-SCNC: 102 MMOL/L (ref 94–109)
CO2 SERPL-SCNC: 26 MMOL/L (ref 20–32)
COLOR UR AUTO: YELLOW
CREAT SERPL-MCNC: 0.95 MG/DL (ref 0.66–1.25)
CRP SERPL-MCNC: <2.9 MG/L (ref 0–8)
DIFFERENTIAL METHOD BLD: ABNORMAL
EOSINOPHIL # BLD AUTO: 0.1 10E9/L (ref 0–0.7)
EOSINOPHIL NFR BLD AUTO: 0.6 %
ERYTHROCYTE [DISTWIDTH] IN BLOOD BY AUTOMATED COUNT: 11.9 % (ref 10–15)
GFR SERPL CREATININE-BSD FRML MDRD: 80 ML/MIN/1.7M2
GLUCOSE SERPL-MCNC: 97 MG/DL (ref 70–99)
GLUCOSE UR STRIP-MCNC: NEGATIVE MG/DL
HCT VFR BLD AUTO: 47 % (ref 40–53)
HGB BLD-MCNC: 17.3 G/DL (ref 13.3–17.7)
HGB UR QL STRIP: NEGATIVE
IMM GRANULOCYTES # BLD: 0.2 10E9/L (ref 0–0.4)
IMM GRANULOCYTES NFR BLD: 1.1 %
KETONES UR STRIP-MCNC: NEGATIVE MG/DL
LACTATE SERPL-SCNC: 0.9 MMOL/L (ref 0.4–2)
LEUKOCYTE ESTERASE UR QL STRIP: NEGATIVE
LIPASE SERPL-CCNC: 523 U/L (ref 73–393)
LYMPHOCYTES # BLD AUTO: 1.9 10E9/L (ref 0.8–5.3)
LYMPHOCYTES NFR BLD AUTO: 14.4 %
MCH RBC QN AUTO: 32.9 PG (ref 26.5–33)
MCHC RBC AUTO-ENTMCNC: 36.8 G/DL (ref 31.5–36.5)
MCV RBC AUTO: 89 FL (ref 78–100)
MONOCYTES # BLD AUTO: 0.8 10E9/L (ref 0–1.3)
MONOCYTES NFR BLD AUTO: 6.1 %
MUCOUS THREADS #/AREA URNS LPF: PRESENT /LPF
NEUTROPHILS # BLD AUTO: 10.2 10E9/L (ref 1.6–8.3)
NEUTROPHILS NFR BLD AUTO: 77.3 %
NITRATE UR QL: NEGATIVE
NRBC # BLD AUTO: 0 10*3/UL
NRBC BLD AUTO-RTO: 0 /100
PH UR STRIP: 6 PH (ref 4.7–8)
PLATELET # BLD AUTO: 195 10E9/L (ref 150–450)
POTASSIUM SERPL-SCNC: 4.1 MMOL/L (ref 3.4–5.3)
PROT SERPL-MCNC: 7.5 G/DL (ref 6.8–8.8)
RBC # BLD AUTO: 5.26 10E12/L (ref 4.4–5.9)
RBC #/AREA URNS AUTO: 1 /HPF (ref 0–2)
SODIUM SERPL-SCNC: 137 MMOL/L (ref 133–144)
SOURCE: ABNORMAL
SP GR UR STRIP: 1.02 (ref 1–1.03)
UROBILINOGEN UR STRIP-MCNC: NORMAL MG/DL (ref 0–2)
WBC # BLD AUTO: 13.2 10E9/L (ref 4–11)
WBC #/AREA URNS AUTO: 1 /HPF (ref 0–5)

## 2018-09-06 PROCEDURE — 25000128 H RX IP 250 OP 636: Performed by: RADIOLOGY

## 2018-09-06 PROCEDURE — 81001 URINALYSIS AUTO W/SCOPE: CPT | Performed by: EMERGENCY MEDICINE

## 2018-09-06 PROCEDURE — 83605 ASSAY OF LACTIC ACID: CPT | Performed by: EMERGENCY MEDICINE

## 2018-09-06 PROCEDURE — 80053 COMPREHEN METABOLIC PANEL: CPT | Performed by: EMERGENCY MEDICINE

## 2018-09-06 PROCEDURE — 36415 COLL VENOUS BLD VENIPUNCTURE: CPT | Performed by: EMERGENCY MEDICINE

## 2018-09-06 PROCEDURE — 99284 EMERGENCY DEPT VISIT MOD MDM: CPT | Mod: Z6 | Performed by: EMERGENCY MEDICINE

## 2018-09-06 PROCEDURE — 74177 CT ABD & PELVIS W/CONTRAST: CPT | Mod: TC

## 2018-09-06 PROCEDURE — 86140 C-REACTIVE PROTEIN: CPT | Performed by: EMERGENCY MEDICINE

## 2018-09-06 PROCEDURE — 85025 COMPLETE CBC W/AUTO DIFF WBC: CPT | Performed by: EMERGENCY MEDICINE

## 2018-09-06 PROCEDURE — 83690 ASSAY OF LIPASE: CPT | Performed by: EMERGENCY MEDICINE

## 2018-09-06 PROCEDURE — 99285 EMERGENCY DEPT VISIT HI MDM: CPT | Mod: 25

## 2018-09-06 RX ORDER — IOPAMIDOL 612 MG/ML
100 INJECTION, SOLUTION INTRAVASCULAR ONCE
Status: COMPLETED | OUTPATIENT
Start: 2018-09-06 | End: 2018-09-06

## 2018-09-06 RX ORDER — METRONIDAZOLE 500 MG/1
500 TABLET ORAL 2 TIMES DAILY
Qty: 14 TABLET | Refills: 0 | Status: SHIPPED | OUTPATIENT
Start: 2018-09-06 | End: 2018-09-13

## 2018-09-06 RX ORDER — METRONIDAZOLE 500 MG/1
500 TABLET ORAL 4 TIMES DAILY
Qty: 28 TABLET | Refills: 0 | Status: SHIPPED | OUTPATIENT
Start: 2018-09-06 | End: 2018-09-06

## 2018-09-06 RX ORDER — ACETAMINOPHEN AND CODEINE PHOSPHATE 300; 30 MG/1; MG/1
1-2 TABLET ORAL EVERY 6 HOURS PRN
Qty: 12 TABLET | Refills: 0 | Status: SHIPPED | OUTPATIENT
Start: 2018-09-06 | End: 2018-09-17

## 2018-09-06 RX ORDER — CIPROFLOXACIN 500 MG/1
500 TABLET, FILM COATED ORAL 2 TIMES DAILY
Qty: 14 TABLET | Refills: 0 | Status: SHIPPED | OUTPATIENT
Start: 2018-09-06 | End: 2018-09-13

## 2018-09-06 RX ADMIN — IOPAMIDOL 100 ML: 612 INJECTION, SOLUTION INTRAVENOUS at 16:09

## 2018-09-06 NOTE — ED AVS SNAPSHOT
[facility]    [address] [address]    Phone:  [phone]                                       Samy Lu   MRN: [id number]    Department:  [facility]   Date of Visit:  9/6/2018           Patient Information     Date Of Birth          1955        Your diagnoses for this visit were:     Abdominal pain, left lower quadrant     Colitis        You were seen by Josue Mata MD.      Follow-up Information     Follow up with Reji Flynn MD In 10 days.    Specialty:  Family Practice    Why:  Re-evaluation, Continuity of care    Contact information:    [address]  [facility] [address]  [phone]        Discharge References/Attachments     GASTROENTERITIS, BACTERIAL (ADULT) (ENGLISH)         Review of your medicines      START taking        Dose / Directions Last dose taken    acetaminophen-codeine 300-30 MG per tablet   Commonly known as:  TYLENOL WITH CODEINE #3   Dose:  1-2 tablet   Quantity:  12 tablet        Take 1-2 tablets by mouth every 6 hours as needed for pain   Refills:  0        ciprofloxacin 500 MG tablet   Commonly known as:  CIPRO   Dose:  500 mg   Quantity:  14 tablet        Take 1 tablet (500 mg) by mouth 2 times daily for 7 days   Refills:  0        metroNIDAZOLE 500 MG tablet   Commonly known as:  FLAGYL   Dose:  500 mg   Quantity:  14 tablet        Take 1 tablet (500 mg) by mouth 2 times daily for 7 days   Refills:  0          Our records show that you are taking the medicines listed below. If these are incorrect, please call your family doctor or clinic.        Dose / Directions Last dose taken    ZINC PO        Take by mouth as needed   Refills:  0                Information about OPIOIDS     PRESCRIPTION OPIOIDS: WHAT YOU NEED TO KNOW   We gave you an opioid (narcotic) pain medicine. It is important to manage your pain, but opioids are not always the best choice. You should first try all the other options your care  team gave you. Take this medicine for as short a time (and as few doses) as possible.    Some activities can increase your pain, such as bandage changes or therapy sessions. It may help to take your pain medicine 30 to 60 minutes before these activities. Reduce your stress by getting enough sleep, working on hobbies you enjoy and practicing relaxation or meditation. Talk to your care team about ways to manage your pain beyond prescription opioids.    These medicines have risks:    DO NOT drive when on new or higher doses of pain medicine. These medicines can affect your alertness and reaction times, and you could be arrested for driving under the influence (DUI). If you need to use opioids long-term, talk to your care team about driving.    DO NOT operate heavy machinery    DO NOT do any other dangerous activities while taking these medicines.    DO NOT drink any alcohol while taking these medicines.     If the opioid prescribed includes acetaminophen, DO NOT take with any other medicines that contain acetaminophen. Read all labels carefully. Look for the word  acetaminophen  or  Tylenol.  Ask your pharmacist if you have questions or are unsure.    You can get addicted to pain medicines, especially if you have a history of addiction (chemical, alcohol or substance dependence). Talk to your care team about ways to reduce this risk.    All opioids tend to cause constipation. Drink plenty of water and eat foods that have a lot of fiber, such as fruits, vegetables, prune juice, apple juice and high-fiber cereal. Take a laxative (Miralax, milk of magnesia, Colace, Senna) if you don t move your bowels at least every other day. Other side effects include upset stomach, sleepiness, dizziness, throwing up, tolerance (needing more of the medicine to have the same effect), physical dependence and slowed breathing.    Store your pills in a secure place, locked if possible. We will not replace any lost or stolen medicine. If you  "don t finish your medicine, please throw away (dispose) as directed by your pharmacist. The [place] [employer]ution [employer] has more information about safe disposal: https://www.pca.state.[place]/living-green/managing-unwanted-medications        Prescriptions were sent or printed at these locations (3 Prescriptions)                   [facility] [place] - [address]   [address] [place] [address]    Telephone:  [phone]   Fax:  [phone]   Hours:                  E-Prescribed (2 of 3)         ciprofloxacin (CIPRO) 500 MG tablet               metroNIDAZOLE (FLAGYL) 500 MG tablet                 Printed at Department/Unit printer (1 of 3)         acetaminophen-codeine (TYLENOL WITH CODEINE #3) 300-30 MG per tablet                Procedures and tests performed during your visit     Abd/pelvis CT - IV contrast only TRAUMA / AAA    CBC with platelets differential    CRP inflammation    Comprehensive metabolic panel    Lactic acid    Lipase    UA reflex to Microscopic and Culture      Orders Needing Specimen Collection     None      Pending Results     No orders found from 9/4/2018 to 9/7/2018.            Pending Culture Results     No orders found from 9/4/2018 to 9/7/2018.            Thank you for choosing [facility]       Thank you for choosing [facility] for your care. Our goal is always to provide you with excellent care. Hearing back from our patients is one way we can continue to improve our services. Please take a few minutes to complete the written survey that you may receive in the mail after you visit with us. Thank you!        [facility] Information     [employer] lets you send messages to your doctor, view your test results, renew your prescriptions, schedule appointments and more. To sign up, go to [email or web address] . Click on \"Log in\" on the left side of the screen, which will take you to the Welcome page. Then click on \"Sign up Now\" on the right side of the page.     You " will be asked to enter the access code listed below, as well as some personal information. Please follow the directions to create your username and password.     Your access code is: [email or web address]  Expires: 2018  4:45 PM     Your access code will  in 90 days. If you need help or a new code, please call your [facility] clinic or [phone].        Care EveryWhere ID     This is your Care EveryWhere ID. This could be used by other organizations to access your [facility] medical records  [id number]        Equal Access to Services     [facility]: Fior Sue, francisco vargas, johnna thomson, nicho price . So [facility] [phone].    ATENCIÓN: Si habla español, tiene a pickering disposición servicios gratuitos de asistencia lingüística. Albertina al [phone].    We comply with applicable federal civil rights laws and [place] laws. We do not discriminate on the basis of race, color, national origin, age, disability, sex, sexual orientation, or gender identity.            After Visit Summary       This is your record. Keep this with you and show to your community pharmacist(s) and doctor(s) at your next visit.

## 2018-09-06 NOTE — ED NOTES
"Ambulated to room 5 independently, gown placed, call light in reach.  Hx of colitis.  LLQ pain started Sat.  Nausea today, no vomiting.  BM today - \"dark stool.\"  Yeast smell to urine, once in a while.  Rates abd pain at 2, constant throbbing. Pain goal is zero.  "

## 2018-09-06 NOTE — ED AVS SNAPSHOT
[facility]    [address]    [place] [address]    Phone:  [phone]                                       Samy Lu   MRN: [id number]    Department:  [facility]   Date of Visit:  9/6/2018           After Visit Summary Signature Page     I have received my discharge instructions, and my questions have been answered. I have discussed any challenges I see with this plan with the nurse or doctor.    ..........................................................................................................................................  Patient/Patient Representative Signature      ..........................................................................................................................................  Patient Representative Print Name and Relationship to Patient    ..................................................               ................................................  Date                                            Time    ..........................................................................................................................................  Reviewed by Signature/Title    ...................................................              ..............................................  Date                                                            Time          22EPIC Rev 08/18

## 2018-09-06 NOTE — ED NOTES
Discharge instructions reviewed with patient, and patient verbalized understanding. Rx x2 sent to [facility] and Rx given to pt. Pt ambulated with a steady gait to the exit.

## 2018-09-06 NOTE — ED PROVIDER NOTES
History     Chief Complaint   Patient presents with     Abdominal Pain     hx of colitis, last week LLQ pain worse since yesterday and today. last BM today     HPI  Samy Lu is a 63 year old male who presents to the emergency department with complaints of left lower quadrant abdominal pain that have been on and off since Saturday.  The pain became very severe this morning and he decided to come to the emergency department.  He denies any blood in stool or urine.  No urinary frequency or dysuria.  No fever, nausea, vomiting, diarrhea, bloody stool.  The pains have since subsided and right now rates the pain as 2/10.  He has been treated for colitis in the past and suspects that is this is a recurrence of colitis.  No history of kidney stones.  When the pain was worst it felt like radiating to the groin, there are no known aggravating or relieving factors.  He is currently not on any medications for the pain.    Problem List:    Patient Active Problem List    Diagnosis Date Noted     ACP (advance care planning) 03/01/2017     Priority: Medium     Advance Care Planning 3/1/2017: ACP Review of Chart / Resources Provided:  Reviewed chart for advance care plan.  Samy Lu has been provided information and resources to begin or update their advance care plan.  Added by Lo Ortega                Past Medical History:    History reviewed. No pertinent past medical history.    Past Surgical History:    Past Surgical History:   Procedure Laterality Date     ABDOMEN SURGERY      right IHR     ABDOMEN SURGERY      2 hernia repairs     ABDOMEN SURGERY      Umbilical hernia repair     COLONOSCOPY  10/21/2009    Due 2014       Family History:    Family History   Problem Relation Age of Onset     Cancer Father      Non Hodgkins Lymphoma     Diabetes Paternal Grandmother        Social History:  Marital Status:   [2]  Social History   Substance Use Topics     Smoking status: Former Smoker     Types:  Cigarettes     Smokeless tobacco: Never Used     Alcohol use Yes      Comment: rare        Medications:      acetaminophen-codeine (TYLENOL WITH CODEINE #3) 300-30 MG per tablet   ciprofloxacin (CIPRO) 500 MG tablet   metroNIDAZOLE (FLAGYL) 500 MG tablet   Multiple Vitamins-Minerals (ZINC PO)         Review of Systems   All other systems reviewed and are negative.      Physical Exam   BP: 138/82  Pulse: 63  Heart Rate: 57  Temp: 97.5  F (36.4  C)  Resp: 16  SpO2: 98 %      Physical Exam   Constitutional: He appears well-developed and well-nourished. No distress.   HENT:   Head: Normocephalic and atraumatic.   Mouth/Throat: Oropharynx is clear and moist.   Cardiovascular: Normal rate, regular rhythm and normal heart sounds.    Pulmonary/Chest: Effort normal and breath sounds normal. No respiratory distress. He has no wheezes.   Abdominal: Soft. Bowel sounds are normal. There is tenderness.       Skin: No rash noted. He is not diaphoretic.   Nursing note and vitals reviewed.      ED Course   Patient evaluated and laboratory tests ordered.  Offered pain medication but declined since the pain had subsided.    ED Course     Procedures         Results for orders placed or performed during the hospital encounter of 09/06/18 (from the past 24 hour(s))   UA reflex to Microscopic and Culture   Result Value Ref Range    Color Urine Yellow     Appearance Urine Clear     Glucose Urine Negative NEG^Negative mg/dL    Bilirubin Urine Negative NEG^Negative    Ketones Urine Negative NEG^Negative mg/dL    Specific Gravity Urine 1.021 1.003 - 1.035    Blood Urine Negative NEG^Negative    pH Urine 6.0 4.7 - 8.0 pH    Protein Albumin Urine 10 (A) NEG^Negative mg/dL    Urobilinogen mg/dL Normal 0.0 - 2.0 mg/dL    Nitrite Urine Negative NEG^Negative    Leukocyte Esterase Urine Negative NEG^Negative    Source Midstream Urine     RBC Urine 1 0 - 2 /HPF    WBC Urine 1 0 - 5 /HPF    Bacteria Urine None (A) NEG^Negative /HPF    Mucous Urine  Present (A) NEG^Negative /LPF   CBC with platelets differential   Result Value Ref Range    WBC 13.2 (H) 4.0 - 11.0 10e9/L    RBC Count 5.26 4.4 - 5.9 10e12/L    Hemoglobin 17.3 13.3 - 17.7 g/dL    Hematocrit 47.0 40.0 - 53.0 %    MCV 89 78 - 100 fl    MCH 32.9 26.5 - 33.0 pg    MCHC 36.8 (H) 31.5 - 36.5 g/dL    RDW 11.9 10.0 - 15.0 %    Platelet Count 195 150 - 450 10e9/L    Diff Method Automated Method     % Neutrophils 77.3 %    % Lymphocytes 14.4 %    % Monocytes 6.1 %    % Eosinophils 0.6 %    % Basophils 0.5 %    % Immature Granulocytes 1.1 %    Nucleated RBCs 0 0 /100    Absolute Neutrophil 10.2 (H) 1.6 - 8.3 10e9/L    Absolute Lymphocytes 1.9 0.8 - 5.3 10e9/L    Absolute Monocytes 0.8 0.0 - 1.3 10e9/L    Absolute Eosinophils 0.1 0.0 - 0.7 10e9/L    Absolute Basophils 0.1 0.0 - 0.2 10e9/L    Abs Immature Granulocytes 0.2 0 - 0.4 10e9/L    Absolute Nucleated RBC 0.0    Comprehensive metabolic panel   Result Value Ref Range    Sodium 137 133 - 144 mmol/L    Potassium 4.1 3.4 - 5.3 mmol/L    Chloride 102 94 - 109 mmol/L    Carbon Dioxide 26 20 - 32 mmol/L    Anion Gap 9 3 - 14 mmol/L    Glucose 97 70 - 99 mg/dL    Urea Nitrogen 14 7 - 30 mg/dL    Creatinine 0.95 0.66 - 1.25 mg/dL    GFR Estimate 80 >60 mL/min/1.7m2    GFR Estimate If Black >90 >60 mL/min/1.7m2    Calcium 8.7 8.5 - 10.1 mg/dL    Bilirubin Total 0.7 0.2 - 1.3 mg/dL    Albumin 4.0 3.4 - 5.0 g/dL    Protein Total 7.5 6.8 - 8.8 g/dL    Alkaline Phosphatase 43 40 - 150 U/L    ALT 34 0 - 70 U/L    AST 14 0 - 45 U/L   CRP inflammation   Result Value Ref Range    CRP Inflammation <2.9 0.0 - 8.0 mg/L   Lactic acid   Result Value Ref Range    Lactic Acid 0.9 0.4 - 2.0 mmol/L   Lipase   Result Value Ref Range    Lipase 523 (H) 73 - 393 U/L   Abd/pelvis CT - IV contrast only TRAUMA / AAA    Narrative    PROCEDURE: CT ABDOMEN PELVIS W CONTRAST 9/6/2018 4:22 PM    HISTORY: Left lower quadrant abdominal pain for 5 days with  leukocytosis;     COMPARISONS:  11/14/2012.    Meds/Dose Given: Isovue 300 100ml Given    TECHNIQUE: Axial postcontrast enhanced images with coronal and  sagittal reformatted images.    FINDINGS: There is linear atelectasis at the left lung base with some  mild atelectasis right lung base as well.    No focal abnormality is seen in the liver, spleen, pancreas, adrenal  glands or in either kidney. There is no hydronephrosis.    There is diffuse thickening of the wall of the distal descending colon  and sigmoid colon. There are multiple diverticula in this portion of  the colon but length of involvement would be atypical for  diverticulitis. There is some slight inflammation in the surrounding  fat. There is no abscess or other fluid collection.    There is no aneurysm of the abdominal aorta. There is some  atherosclerotic calcification. There is multilevel degenerative  change.    There is a small fat-containing left inguinal hernia.         Impression    IMPRESSION:   1. Diffuse thickening of the wall of the distal descending and sigmoid  colon. While this could represent diverticulitis length of involvement  is longer than typically seen and other causes of colitis such as  infectious or inflammatory colitis are considered more likely. There  is no associated abscess or fluid collection.  2. Atelectasis at both lung bases.    BREANNA HOLMAN MD       Medications   iopamidol (ISOVUE-300) IV solution 61% 100 mL (100 mLs Intravenous Given 9/6/18 1609)   sodium chloride (PF) 0.9% PF flush 60 mL (60 mLs Intravenous Given 9/6/18 1609)       Assessments & Plan (with Medical Decision Making)   Descending and sigmoid colitis: Unclear whether this is infective or inflammatory. However, white cell count is elevated to 13 and therefore may warrant treatment with antibiotics.  Will start patient on ciprofloxacin and Flagyl for the next 1 week and advised follow-up with primary care physician.  If colitis is still persistent then would recommend out  inflammatory bowel disease.  Prescription given for ciprofloxacin and Flagyl.  May take Tylenol 3 as needed for pain.  Subsequently discharged from the ED.    I have reviewed the nursing notes.    I have reviewed the findings, diagnosis, plan and need for follow up with the patient.    Discharge Medication List as of 9/6/2018  4:45 PM      START taking these medications    Details   acetaminophen-codeine (TYLENOL WITH CODEINE #3) 300-30 MG per tablet Take 1-2 tablets by mouth every 6 hours as needed for pain, Disp-12 tablet, R-0, Local Print      ciprofloxacin (CIPRO) 500 MG tablet Take 1 tablet (500 mg) by mouth 2 times daily for 7 days, Disp-14 tablet, R-0, E-Prescribe      metroNIDAZOLE (FLAGYL) 500 MG tablet Take 1 tablet (500 mg) by mouth 2 times daily for 7 days, Disp-14 tablet, R-0, E-Prescribe             Final diagnoses:   Abdominal pain, left lower quadrant   Colitis       9/6/2018   [facility]     Josue Mata MD  09/06/18 2384

## 2018-09-17 ENCOUNTER — OFFICE VISIT (OUTPATIENT)
Dept: FAMILY MEDICINE | Facility: OTHER | Age: 63
End: 2018-09-17
Attending: FAMILY MEDICINE
Payer: COMMERCIAL

## 2018-09-17 VITALS
BODY MASS INDEX: 26.48 KG/M2 | WEIGHT: 185 LBS | OXYGEN SATURATION: 97 % | SYSTOLIC BLOOD PRESSURE: 104 MMHG | HEIGHT: 70 IN | DIASTOLIC BLOOD PRESSURE: 70 MMHG | TEMPERATURE: 98.5 F | HEART RATE: 65 BPM | RESPIRATION RATE: 16 BRPM

## 2018-09-17 DIAGNOSIS — K52.9 COLITIS: Primary | ICD-10-CM

## 2018-09-17 LAB
BASOPHILS # BLD AUTO: 0.1 10E9/L (ref 0–0.2)
BASOPHILS NFR BLD AUTO: 0.4 %
DIFFERENTIAL METHOD BLD: ABNORMAL
EOSINOPHIL # BLD AUTO: 0.2 10E9/L (ref 0–0.7)
EOSINOPHIL NFR BLD AUTO: 1.3 %
ERYTHROCYTE [DISTWIDTH] IN BLOOD BY AUTOMATED COUNT: 12.4 % (ref 10–15)
HCT VFR BLD AUTO: 46 % (ref 40–53)
HGB BLD-MCNC: 16.8 G/DL (ref 13.3–17.7)
LYMPHOCYTES # BLD AUTO: 2.4 10E9/L (ref 0.8–5.3)
LYMPHOCYTES NFR BLD AUTO: 18.5 %
MCH RBC QN AUTO: 33.5 PG (ref 26.5–33)
MCHC RBC AUTO-ENTMCNC: 36.5 G/DL (ref 31.5–36.5)
MCV RBC AUTO: 92 FL (ref 78–100)
MONOCYTES # BLD AUTO: 1.1 10E9/L (ref 0–1.3)
MONOCYTES NFR BLD AUTO: 8.4 %
NEUTROPHILS # BLD AUTO: 9.2 10E9/L (ref 1.6–8.3)
NEUTROPHILS NFR BLD AUTO: 71.4 %
PLATELET # BLD AUTO: 179 10E9/L (ref 150–450)
RBC # BLD AUTO: 5.01 10E12/L (ref 4.4–5.9)
WBC # BLD AUTO: 12.9 10E9/L (ref 4–11)

## 2018-09-17 PROCEDURE — 36415 COLL VENOUS BLD VENIPUNCTURE: CPT | Performed by: FAMILY MEDICINE

## 2018-09-17 PROCEDURE — 85025 COMPLETE CBC W/AUTO DIFF WBC: CPT | Performed by: FAMILY MEDICINE

## 2018-09-17 PROCEDURE — 99213 OFFICE O/P EST LOW 20 MIN: CPT | Performed by: FAMILY MEDICINE

## 2018-09-17 ASSESSMENT — ANXIETY QUESTIONNAIRES
7. FEELING AFRAID AS IF SOMETHING AWFUL MIGHT HAPPEN: NOT AT ALL
GAD7 TOTAL SCORE: 0
6. BECOMING EASILY ANNOYED OR IRRITABLE: NOT AT ALL
3. WORRYING TOO MUCH ABOUT DIFFERENT THINGS: NOT AT ALL
2. NOT BEING ABLE TO STOP OR CONTROL WORRYING: NOT AT ALL
5. BEING SO RESTLESS THAT IT IS HARD TO SIT STILL: NOT AT ALL
4. TROUBLE RELAXING: NOT AT ALL
1. FEELING NERVOUS, ANXIOUS, OR ON EDGE: NOT AT ALL

## 2018-09-17 ASSESSMENT — PAIN SCALES - GENERAL: PAINLEVEL: MILD PAIN (2)

## 2018-09-17 NOTE — NURSING NOTE
"Chief Complaint   Patient presents with     FU After ER Visit       Initial /70  Pulse 65  Temp 98.5  F (36.9  C) (Tympanic)  Resp 16  Ht 5' 9.5\" (1.765 m)  Wt 185 lb (83.9 kg)  SpO2 97%  BMI 26.93 kg/m2 Estimated body mass index is 26.93 kg/(m^2) as calculated from the following:    Height as of this encounter: 5' 9.5\" (1.765 m).    Weight as of this encounter: 185 lb (83.9 kg).  Medication Reconciliation: complete    Latisha Salmon LPN    "

## 2018-09-17 NOTE — PROGRESS NOTES
"  SUBJECTIVE:   Samy Lu is a 63 year old male who presents to clinic today for the following health issues:      ED/UC Followup:    [place]  [facility] [place] of visit: 9-6-2018  Reason for visit: colitis adbomin pain lower left  Current Status: Doing better, still some mild pain         PROBLEMS TO ADD ON...    Problem list and histories reviewed & adjusted, as indicated.  Additional history: poorly defined.  Very much doing better.  This has happened before for him.  We discussed.  Stools have improved.  Pain is better but not resolved.     Patient Active Problem List   Diagnosis     ACP (advance care planning)     Past Surgical History:   Procedure Laterality Date     ABDOMEN SURGERY      right IHR     ABDOMEN SURGERY      2 hernia repairs     ABDOMEN SURGERY      Umbilical hernia repair     COLONOSCOPY  10/21/2009    Due 2014       Social History   Substance Use Topics     Smoking status: Former Smoker     Types: Cigarettes     Smokeless tobacco: Never Used     Alcohol use Yes      Comment: rare     Family History   Problem Relation Age of Onset     Cancer Father      Non Hodgkins Lymphoma     Diabetes Paternal Grandmother          Current Outpatient Prescriptions   Medication Sig Dispense Refill     Multiple Vitamins-Minerals (ZINC PO) Take by mouth as needed       No Known Allergies    Reviewed and updated as needed this visit by clinical staff  Tobacco  Allergies  Meds       Reviewed and updated as needed this visit by Provider         ROS:  Constitutional, HEENT, cardiovascular, pulmonary, gi and gu systems are negative, except as otherwise noted.    OBJECTIVE:                                                    /70  Pulse 65  Temp 98.5  F (36.9  C) (Tympanic)  Resp 16  Ht 5' 9.5\" (1.765 m)  Wt 185 lb (83.9 kg)  SpO2 97%  BMI 26.93 kg/m2  Body mass index is 26.93 kg/(m^2).  GENERAL APPEARANCE: Alert, no acute distress  CV: regular rate and rhythm, no murmur, rub or gallop  RESP: lungs " clear to auscultation bilaterally  ABDOMEN: normal bowel sounds, soft, minimal LLQ tenderness, no hepatosplenomegaly or other masses  SKIN: no suspicious lesions or rashes to visualized skin  NEURO: Alert, oriented x 3, speech and mentation normal      Cbc showing ongoing slight elevation in the wbc.      ASSESSMENT/PLAN:                                                    1. Colitis  Reviewed.  Infectious vs. Other.  Segment long for diverticulitis.  He has had diverticulitis before, along with fissures.  He wants to just stay the course and see if this resolves fully.  I offered colon consult for biopsies.  He will think on it and call if ongoing sx persist.    - CBC with platelets and differential  - CBC with platelets differential; Future          Reji Flynn MD  [facility]

## 2018-09-17 NOTE — MR AVS SNAPSHOT
"              After Visit Summary   9/17/2018    Samy Lu    MRN: [id number]           Patient Information     Date Of Birth          1955        Visit Information        Provider Department      9/17/2018 3:45 PM Reji Flynn MD [facility]        Today's Diagnoses     Colitis    -  1      Care Instructions    F/u with ongoing concerns.             Follow-ups after your visit        Future tests that were ordered for you today     Open Future Orders        Priority Expected Expires Ordered    CBC with platelets differential Routine 9/24/2018 10/1/2018 9/17/2018            Who to contact     If you have questions or need follow up information about today's clinic visit or your schedule please contact [facility] directly at [phone].  Normal or non-critical lab and imaging results will be communicated to you by MyChart, letter or phone within 4 business days after the clinic has received the results. If you do not hear from us within 7 days, please contact the clinic through MyChart or phone. If you have a critical or abnormal lab result, we will notify you by phone as soon as possible.  Submit refill requests through [email or web address] or call your pharmacy and they will forward the refill request to us. Please allow 3 business days for your refill to be completed.          Additional Information About Your Visit        MyCharMicrosaic Information     [email or web address] lets you send messages to your doctor, view your test results, renew your prescriptions, schedule appointments and more. To sign up, go to [email or web address] . Click on \"Log in\" on the left side of the screen, which will take you to the Welcome page. Then click on \"Sign up Now\" on the right side of the page.     You will be asked to enter the access code listed below, as well as some personal information. Please follow the directions to create your username and password.     Your access code is: " [email or web address]  Expires: 2018  4:45 PM     Your access code will  in 90 days. If you need help or a new code, please call your [place] clinic or [phone].        Care EveryWhere ID     This is your Care EveryWhere ID. This could be used by other organizations to access your [place] medical records  [id number]        Your Vitals Were     Pulse Temperature Respirations Height Pulse Oximetry BMI (Body Mass Index)    65 98.5  F (36.9  C) (Tympanic) 16 5' 9.5\" (1.765 m) 97% 26.93 kg/m2       Blood Pressure from Last 3 Encounters:   18 104/70   18 141/98   18 90/62    Weight from Last 3 Encounters:   18 185 lb (83.9 kg)   18 204 lb (92.5 kg)   10/11/17 202 lb 12.8 oz (92 kg)              We Performed the Following     CBC with platelets and differential        Primary Care Provider Office Phone # Fax #    Reji Flynn [phone]       [address] [place] [address]        Equal Access to Services     SIERRA LINDQUIST AH: Hadii aad ku hadasho Soomaali, waaxda luqadaha, qaybta kaalmada adeegyada, nicho junen robin price ah. So [facility] [phone].    ATENCIÓN: Si habla español, tiene a pickering disposición servicios gratuitos de asistencia lingüística. [facility] [phone].    We comply with applicable federal civil rights laws and [place] laws. We do not discriminate on the basis of race, color, national origin, age, disability, sex, sexual orientation, or gender identity.            Thank you!     Thank you for choosing [facility]  for your care. Our goal is always to provide you with excellent care. Hearing back from our patients is one way we can continue to improve our services. Please take a few minutes to complete the written survey that you may receive in the mail after your visit with us. Thank you!             Your Updated Medication List - Protect others around you: Learn how to safely use, store and throw away " your medicines at www.disposemymeds.org.          This list is accurate as of 9/17/18  4:27 PM.  Always use your most recent med list.                   Brand Name Dispense Instructions for use Diagnosis    ZINC PO      Take by mouth as needed

## 2018-09-18 ASSESSMENT — ANXIETY QUESTIONNAIRES: GAD7 TOTAL SCORE: 0

## 2018-09-18 ASSESSMENT — PATIENT HEALTH QUESTIONNAIRE - PHQ9: SUM OF ALL RESPONSES TO PHQ QUESTIONS 1-9: 0

## 2018-09-24 DIAGNOSIS — K52.9 COLITIS: ICD-10-CM

## 2018-09-24 LAB
BASOPHILS # BLD AUTO: 0.1 10E9/L (ref 0–0.2)
BASOPHILS NFR BLD AUTO: 0.6 %
DIFFERENTIAL METHOD BLD: ABNORMAL
EOSINOPHIL # BLD AUTO: 0.1 10E9/L (ref 0–0.7)
EOSINOPHIL NFR BLD AUTO: 1.4 %
ERYTHROCYTE [DISTWIDTH] IN BLOOD BY AUTOMATED COUNT: 12.1 % (ref 10–15)
HCT VFR BLD AUTO: 45.3 % (ref 40–53)
HGB BLD-MCNC: 16.7 G/DL (ref 13.3–17.7)
IMM GRANULOCYTES # BLD: 0.1 10E9/L (ref 0–0.4)
IMM GRANULOCYTES NFR BLD: 0.5 %
LYMPHOCYTES # BLD AUTO: 2.1 10E9/L (ref 0.8–5.3)
LYMPHOCYTES NFR BLD AUTO: 20.3 %
MCH RBC QN AUTO: 33.4 PG (ref 26.5–33)
MCHC RBC AUTO-ENTMCNC: 36.9 G/DL (ref 31.5–36.5)
MCV RBC AUTO: 91 FL (ref 78–100)
MONOCYTES # BLD AUTO: 0.9 10E9/L (ref 0–1.3)
MONOCYTES NFR BLD AUTO: 8.7 %
NEUTROPHILS # BLD AUTO: 7.1 10E9/L (ref 1.6–8.3)
NEUTROPHILS NFR BLD AUTO: 68.5 %
NRBC # BLD AUTO: 0 10*3/UL
NRBC BLD AUTO-RTO: 0 /100
PLATELET # BLD AUTO: 220 10E9/L (ref 150–450)
RBC # BLD AUTO: 5 10E12/L (ref 4.4–5.9)
WBC # BLD AUTO: 10.3 10E9/L (ref 4–11)

## 2018-09-24 PROCEDURE — 36415 COLL VENOUS BLD VENIPUNCTURE: CPT | Performed by: FAMILY MEDICINE

## 2018-09-24 PROCEDURE — 85025 COMPLETE CBC W/AUTO DIFF WBC: CPT | Performed by: FAMILY MEDICINE

## 2018-10-01 ENCOUNTER — OFFICE VISIT (OUTPATIENT)
Dept: FAMILY MEDICINE | Facility: OTHER | Age: 63
End: 2018-10-01
Attending: FAMILY MEDICINE
Payer: COMMERCIAL

## 2018-10-01 ENCOUNTER — TELEPHONE (OUTPATIENT)
Dept: FAMILY MEDICINE | Facility: OTHER | Age: 63
End: 2018-10-01

## 2018-10-01 VITALS
TEMPERATURE: 97.4 F | OXYGEN SATURATION: 95 % | SYSTOLIC BLOOD PRESSURE: 124 MMHG | WEIGHT: 196 LBS | BODY MASS INDEX: 28.06 KG/M2 | DIASTOLIC BLOOD PRESSURE: 68 MMHG | HEART RATE: 68 BPM | HEIGHT: 70 IN

## 2018-10-01 DIAGNOSIS — N42.82 PROSTATITIS SYNDROME: ICD-10-CM

## 2018-10-01 DIAGNOSIS — K52.9 COLITIS: Primary | ICD-10-CM

## 2018-10-01 LAB
BASOPHILS # BLD AUTO: 0 10E9/L (ref 0–0.2)
BASOPHILS NFR BLD AUTO: 0.4 %
DIFFERENTIAL METHOD BLD: ABNORMAL
EOSINOPHIL # BLD AUTO: 0.1 10E9/L (ref 0–0.7)
EOSINOPHIL NFR BLD AUTO: 1.1 %
ERYTHROCYTE [DISTWIDTH] IN BLOOD BY AUTOMATED COUNT: 12.5 % (ref 10–15)
HCT VFR BLD AUTO: 43.8 % (ref 40–53)
HGB BLD-MCNC: 15.6 G/DL (ref 13.3–17.7)
LYMPHOCYTES # BLD AUTO: 1.6 10E9/L (ref 0.8–5.3)
LYMPHOCYTES NFR BLD AUTO: 16.1 %
MCH RBC QN AUTO: 33.1 PG (ref 26.5–33)
MCHC RBC AUTO-ENTMCNC: 35.6 G/DL (ref 31.5–36.5)
MCV RBC AUTO: 93 FL (ref 78–100)
MONOCYTES # BLD AUTO: 0.8 10E9/L (ref 0–1.3)
MONOCYTES NFR BLD AUTO: 7.9 %
NEUTROPHILS # BLD AUTO: 7.2 10E9/L (ref 1.6–8.3)
NEUTROPHILS NFR BLD AUTO: 74.5 %
PLATELET # BLD AUTO: 209 10E9/L (ref 150–450)
PSA SERPL-MCNC: 0.64 UG/L (ref 0–4)
RBC # BLD AUTO: 4.71 10E12/L (ref 4.4–5.9)
WBC # BLD AUTO: 9.7 10E9/L (ref 4–11)

## 2018-10-01 PROCEDURE — 84153 ASSAY OF PSA TOTAL: CPT | Performed by: FAMILY MEDICINE

## 2018-10-01 PROCEDURE — 85025 COMPLETE CBC W/AUTO DIFF WBC: CPT | Performed by: FAMILY MEDICINE

## 2018-10-01 PROCEDURE — 36415 COLL VENOUS BLD VENIPUNCTURE: CPT | Performed by: FAMILY MEDICINE

## 2018-10-01 PROCEDURE — 99213 OFFICE O/P EST LOW 20 MIN: CPT | Performed by: FAMILY MEDICINE

## 2018-10-01 ASSESSMENT — PAIN SCALES - GENERAL: PAINLEVEL: MILD PAIN (2)

## 2018-10-01 ASSESSMENT — ANXIETY QUESTIONNAIRES
3. WORRYING TOO MUCH ABOUT DIFFERENT THINGS: NOT AT ALL
7. FEELING AFRAID AS IF SOMETHING AWFUL MIGHT HAPPEN: NOT AT ALL
5. BEING SO RESTLESS THAT IT IS HARD TO SIT STILL: NOT AT ALL
GAD7 TOTAL SCORE: 0
6. BECOMING EASILY ANNOYED OR IRRITABLE: NOT AT ALL
2. NOT BEING ABLE TO STOP OR CONTROL WORRYING: NOT AT ALL
1. FEELING NERVOUS, ANXIOUS, OR ON EDGE: NOT AT ALL

## 2018-10-01 ASSESSMENT — PATIENT HEALTH QUESTIONNAIRE - PHQ9: 5. POOR APPETITE OR OVEREATING: NOT AT ALL

## 2018-10-01 NOTE — NURSING NOTE
"Chief Complaint   Patient presents with     Colitis       Initial /68  Pulse 68  Temp 97.4  F (36.3  C) (Tympanic)  Ht 5' 9.5\" (1.765 m)  Wt 196 lb (88.9 kg)  SpO2 95%  BMI 28.53 kg/m2 Estimated body mass index is 28.53 kg/(m^2) as calculated from the following:    Height as of this encounter: 5' 9.5\" (1.765 m).    Weight as of this encounter: 196 lb (88.9 kg).  Medication Reconciliation: complete    Kelly Layne LPN  "

## 2018-10-01 NOTE — MR AVS SNAPSHOT
After Visit Summary   10/1/2018    Samy Lu    MRN: [id number]           Patient Information     Date Of Birth          1955        Visit Information        Provider Department      10/1/2018 11:00 AM Reji Flynn MD [facility]        Today's Diagnoses     Colitis    -  1    Prostatitis syndrome          Care Instructions    F/u with ongoing concerns.             Follow-ups after your visit        Additional Services     GENERAL SURG ADULT REFERRAL       Your provider has referred you to: general surg.  Colitis on CT.  Needs scope I think.      Please be aware that coverage of these services is subject to the terms and limitations of your health insurance plan.  Call member services at your health plan with any benefit or coverage questions.      Please bring the following with you to your appointment:    (1) Any X-Rays, CTs or MRIs which have been performed.  Contact the facility where they were done to arrange for  prior to your scheduled appointment.   (2) List of current medications   (3) This referral request   (4) Any documents/labs given to you for this referral                  Who to contact     If you have questions or need follow up information about today's clinic visit or your schedule please contact [facility] directly at [phone].  Normal or non-critical lab and imaging results will be communicated to you by MyChart, letter or phone within 4 business days after the clinic has received the results. If you do not hear from us within 7 days, please contact the clinic through MyChart or phone. If you have a critical or abnormal lab result, we will notify you by phone as soon as possible.  Submit refill requests through Classtingt or call your pharmacy and they will forward the refill request to us. Please allow 3 business days for your refill to be completed.          Additional Information About Your Visit       "  magnify360hart Information     Andela lets you send messages to your doctor, view your test results, renew your prescriptions, schedule appointments and more. To sign up, go to [email or web address] . Click on \"Log in\" on the left side of the screen, which will take you to the Welcome page. Then click on \"Sign up Now\" on the right side of the page.     You will be asked to enter the access code listed below, as well as some personal information. Please follow the directions to create your username and password.     Your access code is: [email or web address]  Expires: 2018  4:45 PM     Your access code will  in 90 days. If you need help or a new code, please call your [place] clinic or [phone].        Care EveryWhere ID     This is your Care EveryWhere ID. This could be used by other organizations to access your [place] medical records  [id number]        Your Vitals Were     Pulse Temperature Height Pulse Oximetry BMI (Body Mass Index)       68 97.4  F (36.3  C) (Tympanic) 5' 9.5\" (1.765 m) 95% 28.53 kg/m2        Blood Pressure from Last 3 Encounters:   10/01/18 124/68   18 104/70   18 141/98    Weight from Last 3 Encounters:   10/01/18 196 lb (88.9 kg)   18 185 lb (83.9 kg)   18 204 lb (92.5 kg)              We Performed the Following     CBC with platelets and differential     GENERAL SURG ADULT REFERRAL     PSA Diagnostic        Primary Care Provider Office Phone # Fax #    Reji Flynn [phone]       [address] [place] [address]        Equal Access to Services     SIERRA LINDQUIST AH: Fior Sue, francisco vargas, johnna beebealmanuel thomson, nicho glasgow. So [facility] [phone].    ATENCIÓN: Si habla español, tiene a pickering disposición servicios gratuitos de asistencia lingüística. Llame al [phone].    We comply with applicable federal civil rights laws and [place] laws. We do not discriminate on the basis " of race, color, national origin, age, disability, sex, sexual orientation, or gender identity.            Thank you!     Thank you for choosing [facility]  for your care. Our goal is always to provide you with excellent care. Hearing back from our patients is one way we can continue to improve our services. Please take a few minutes to complete the written survey that you may receive in the mail after your visit with us. Thank you!             Your Updated Medication List - Protect others around you: Learn how to safely use, store and throw away your medicines at www.disposemymeds.org.          This list is accurate as of 10/1/18  1:06 PM.  Always use your most recent med list.                   Brand Name Dispense Instructions for use Diagnosis    ZINC PO      Take by mouth as needed

## 2018-10-01 NOTE — TELEPHONE ENCOUNTER
9:01 AM    Reason for Call: OVERBOOK    Patient is having the following symptoms: colitis  for 4 months.    The patient is requesting an appointment for10/01/2018   with Dr Flynn.    Was an appointment offered for this call? No 1st available 10/24  If yes : Appointment type              Date    Preferred method for responding to this message: Telephone Call  What is your phone number ?  [phone]    If we cannot reach you directly, may we leave a detailed response at the number you provided? Yes    Can this message wait until your PCP/provider returns, if unavailable today? Not applicable, provider is in     Buffy Ley

## 2018-10-01 NOTE — PROGRESS NOTES
"  SUBJECTIVE:   Samy Lu is a 63 year old male who presents to clinic today for the following health issues:        Colitis      Duration: 2 weeks    Description (location/character/radiation): Colitis    Intensity:  moderate    Accompanying signs and symptoms: Pain on left side, urinary problems; feels urge to go but can't always go.    History (similar episodes/previous evaluation): Has hx of colitis    Precipitating or alleviating factors: None    Therapies tried and outcome: None       PROBLEMS TO ADD ON...    Problem list and histories reviewed & adjusted, as indicated.  Additional history: history of significant prostatitis.  We reviewed at some length recent CT, new sx of mild urinary retention.     Patient Active Problem List   Diagnosis     ACP (advance care planning)     Past Surgical History:   Procedure Laterality Date     ABDOMEN SURGERY      right IHR     ABDOMEN SURGERY      2 hernia repairs     ABDOMEN SURGERY      Umbilical hernia repair     COLONOSCOPY  10/21/2009    Due 2014       Social History   Substance Use Topics     Smoking status: Former Smoker     Types: Cigarettes     Smokeless tobacco: Never Used     Alcohol use Yes      Comment: rare     Family History   Problem Relation Age of Onset     Cancer Father      Non Hodgkins Lymphoma     Diabetes Paternal Grandmother          Current Outpatient Prescriptions   Medication Sig Dispense Refill     Multiple Vitamins-Minerals (ZINC PO) Take by mouth as needed       Not on File    Reviewed and updated as needed this visit by clinical staff       Reviewed and updated as needed this visit by Provider         ROS:  Constitutional, HEENT, cardiovascular, pulmonary, gi and gu systems are negative, except as otherwise noted.    OBJECTIVE:                                                    /68  Pulse 68  Temp 97.4  F (36.3  C) (Tympanic)  Ht 5' 9.5\" (1.765 m)  Wt 196 lb (88.9 kg)  SpO2 95%  BMI 28.53 kg/m2  Body mass index is 28.53 " kg/(m^2).  GENERAL APPEARANCE: Alert, no acute distress  CV: regular rate and rhythm, no murmur, rub or gallop  RESP: lungs clear to auscultation bilaterally  ABDOMEN: normal bowel sounds, soft, nontender, no hepatosplenomegaly or other masses  SKIN: no suspicious lesions or rashes to visualized skin  NEURO: Alert, oriented x 3, speech and mentation normal  Rectal:  Normal prostate without tenderness.      Cbc stable and PSA pending.      ASSESSMENT/PLAN:                                                    1. Colitis  Reviewed.  Seeing surgery for consideration of colonoscopy.  Having the LLQ pain concern for ongoing sx.  No alarming sx right now, so just planning to wait for further workup.   - PSA Diagnostic  - CBC with platelets and differential  - GENERAL SURG ADULT REFERRAL    2. Prostatitis syndrome  Discussed.  Consider early change.  PSA pending.  Normal wbc and non tender prostate so we will just observe.    - PSA Diagnostic  - CBC with platelets and differential          Reji Flynn MD  [facility]

## 2018-10-02 ASSESSMENT — ANXIETY QUESTIONNAIRES: GAD7 TOTAL SCORE: 0

## 2018-10-02 ASSESSMENT — PATIENT HEALTH QUESTIONNAIRE - PHQ9: SUM OF ALL RESPONSES TO PHQ QUESTIONS 1-9: 1

## 2018-10-10 ENCOUNTER — OFFICE VISIT (OUTPATIENT)
Dept: SURGERY | Facility: OTHER | Age: 63
End: 2018-10-10
Attending: FAMILY MEDICINE
Payer: COMMERCIAL

## 2018-10-10 VITALS
OXYGEN SATURATION: 96 % | DIASTOLIC BLOOD PRESSURE: 76 MMHG | BODY MASS INDEX: 28.06 KG/M2 | TEMPERATURE: 98.1 F | WEIGHT: 196 LBS | SYSTOLIC BLOOD PRESSURE: 118 MMHG | HEART RATE: 70 BPM | HEIGHT: 70 IN

## 2018-10-10 DIAGNOSIS — K52.9 COLITIS: ICD-10-CM

## 2018-10-10 PROCEDURE — 99243 OFF/OP CNSLTJ NEW/EST LOW 30: CPT | Performed by: SURGERY

## 2018-10-10 RX ORDER — SODIUM, POTASSIUM,MAG SULFATES 17.5-3.13G
SOLUTION, RECONSTITUTED, ORAL ORAL
Qty: 2 BOTTLE | Refills: 0 | Status: ON HOLD | OUTPATIENT
Start: 2018-10-10 | End: 2018-11-19

## 2018-10-10 ASSESSMENT — PAIN SCALES - GENERAL: PAINLEVEL: NO PAIN (1)

## 2018-10-10 NOTE — PATIENT INSTRUCTIONS
GUIDE TO YOUR COLONOSCOPY WITH SUPREP    Date of Procedure: 11/19/18 with Dr. Shi  Admit time: Surgery Department will call you the day before your procedure by 5pm with your admit time. If your surgery is on Monday, please expect a call on Friday.  If we were unable to reach you before 5PM, you may call admitting at [phone] or toll free at [phone] ext [phone]  Please call the Registered Nurse in Surgery Education at [phone] or toll free at [phone] one to two weeks before your procedure and have an allergy and medication list ready     Call the surgery nurse or your primary care provider if you should become ill within 1 week of your procedure and we will reschedule it when you are healthy. This includes signs or symptoms of a cold or the flu. This can include fever, chills, sore throat, cough, chest congestions, productive cough, runny nose.     At [facility], we want to make sure that your colonoscopy is as pleasant as possible. This guide is designed to answer any questions you might have and to walk you through the preparations you will need to make before your procedure.  Should you have additional questions, please feel free to contact us. Contact numbers are listed below. Thank you for choosing [facility].    Clinic Health Unit Coordinator: [phone]  Clinic Nurse: [phone] (or [phone]; [phone])  Surgery Education Nurse: [phone] or toll free [phone]    All nursing questions or concerns can be directed to the clinic or surgery education nurse  If you have a scheduling or appointment question, or need to postpone your procedure, please call the Health Unit Coordinator between 8am and 4pm Monday through Friday.  After hours or on weekends, please call [phone] to postpone.     COLONOSCOPY PREP    7 DAYS BEFORE THE EXAM:  Do not take Aspirin (325mg)or other NSAIDS (Ibuprofen, Motrin, Aleve, Celebrex, Naproxen, etc) 7 days before your  "surgery. Tylenol is fine. Stop taking fiber supplements, vitamins, iron or vitamins that contain iron, and herbals.    Do not eat any corn, nuts or seeds.     Arrange transportation with a family member or friend to drive you home and have an adult available to stay with you for the next 4 hours when you arrive home for your safety. If you need to take a taxi or the bus, you MUST have a responsible adult to ride with you OR YOUR PROCEDURE WILL BE CANCELLED. It is recommended that you DO NOT DRIVE for the next 24 hours after receiving anesthesia.      prescriptions at your pharmacy as soon as possible. If it has been more than one week since your appointment was scheduled, please call your pharmacy to verify it is still ready for .     Call the Surgery Education Nurse if you should become ill within 1 week of your procedure and we will reschedule it when you are healthy. This includes sings or symptoms of a cold or the flu. This can include fever, chills, sore throat, cough, chest congestions, productive cough, runny nose.     2 DAYS BEFORE THE EXAM:   Begin a low fiber diet. No raw fruits or vegatables or whole grains.  Please see the list of foods you can have and foods to avoid on page 3 of the separate \"Split-Dose SuPrep\" colonoscopy instruction packet.   Drink at least 4-6 large glasses of sports drink each day (not red or purple).      1 DAY BEFORE THE EXAM:  DO NOT EAT ANY SOLID FOOD OR MILK PRODUCTS AFTER 12:00 AM (MIDNIGHT).  Drink only clear liquids for breakfast, lunch and dinner. (No red or purple colors)  Please see the list of liquids you can have and what to avoid on page 2 of the separate \"Split-Dose SuPrep\" colonoscopy instruction packet.   Follow the instructions of your colon preparation.  No red or purple colors, milk products, or alcohol.       AT 6:00 PM THE EVENING PRIOR TO PROCEDURE:  Pour one 16 ounce bottle of SUPREP liquid into the mixing container.  Add cool drinking water to " "the 16 ounce line on the container and mix.  Note: Be sure to dilute SUPREP before you drink it.  Drink ALL the liquid in the container.    You must drink 2 or more 16 ounce containers of water over the next hour.  Stay near a toilet while using this medication.     DAY OF COLONOSCOPY PROCEDURE:    6 HOURS PRIOR TO THE EXAM (set an alarm):  Pour the 2nd 16 ounce bottle of SUPREP liquid into the mixing container.  Add cool drinking water to the 16 ounce line on the container and mix.  Note: Be sure to dilute SUPREP before you drink it.  Drink ALL the liquid in the container.  You must drink 2 or more 16 ounce containers of water over the next hour.  You should be done with with prep 4 hours before the exam.    You may continue clear liquids until 3 hours prior to exam.   If you must take medication, take it with a sip of water.    Wear comfortable clothes. No jewelry, body piercings, make-up, nail polish, hair spray, lotions, perfumes or colognes. Shower before you arrive.  Painter in Admitting through the [facility].  You must have a  with you and and adult available to stay with your for 4 hours at home. The medicine used in this test will make you sleepy. If you do not have someone, please reschedule or your test will be cancelled.  It is recommended that you do not drive for 24 hours after your test. Do not operate power equipment, drink alcoholic beverages, make important decisions or sign legal documents.     COLONOSCOPY FREQUENTLY ASKED QUESTIONS    What is a colonoscopy?    A colonoscopy is a test to look at the lining of your large intestine. The purpose of the exam is to check for abnormalities including growths called \"polyps\" that can lead to serious disease. A flexibles scope is inserted into your rectum by the doctor to examine your large intestine.    What are polyps?  Polyps are abnormal growths on the lining of the colon. Most polyps are not cancerous, but some polyps have the potential to " turn into cancer with time. Polyps can also bleed. For these reasons, most polyps are removed during a colonoscopy and sent to the laboratory for microscopic examination.    What preparation is needed?  The colon must be completely clean for the procedure to be performed. You may be given one or two different prep solutions to cleanse your bowel. You will also need to follow a clear liquid diet the day before your procedure.    What happens after the procedure?  After your procedure is complete, you will be taken back to your day surgery room where you will be monitored for approximately 1 hour. You can expect to feel drowsy for several hours afterward. You may experience some cramping or bloating due to the air introduced into your colon during the exam. You will not be able to drive or operate machinery the rest of the day. You will be given written discharge instructions and appropriate learning material before you go home. You must have an adult to stay at home with you for the next 4 hours after you leave the hospital for your safety.    When will I find out the results of my test?  Your surgeon will talk to you and your designated  before you leave and usually the preliminary results can be given to you at that time. If a biopsy was taken during your procedure, it will be sent to the laboratory for examination. Results usually take one week. You will be contacted by phone or by letter with results.      TIPS FOR COLON CLEANSING BEFORE YOUR COLONOSCOPY    To get accurate results from your exam, your colon must be completely clean and empty. Please follow your doctor's instructions. If you do not, you may need to repeat both the exam and colon-cleansing process.    The medicine you take may cause bloating, nausea and other discomfort. Follow these tips to make the process as easy as possible:     You may use alcohol-free baby wipes to ease anal irritation. You may also use Vaseline to help protect the  skin. Other options include Tucks wipes, hemorrhoid treatments and hydrocortisone cream.    To chill the solution, put it in your refrigerator or set it in a bowl of ice. Do not add ice in your drinking glass. You may remove the colon preparation from the refrigerator 15-30 minutes before drinking.    Stay near a toilet! You will have diarrhea (loose watery stools) and may also have chills. Dress for comfort. Expect to feel discomfort until the stool clears from your colon. This usually takes about 2 to 4 hours.    If you followed your doctor's orders and your stool is a clear or yellow liquid, you are ready for the exam. If you are not sure if your colon is clean, please call your clinic and ask to speak to a nurse.         If SuPrep is not covered by insurance and you would like to opt for Golytely as an alternate prep, please call the nurse to request a new prescription to your pharmacy. Sometimes the pharmacy will contact our office in advance if the prescription is not covered. The dietary instructions are the same for both bowel preps. Take Dulcolax 5mg at bedtime 2 nights before procedure and at 3pm day before procedure. Drink 1/2 of the the golytely at 6pm day before procedure 1  8 oz glass every 15 minutes. Repeat with the 2nd 1/2 of Golytely day of procedure 6 hours prior to check in time.

## 2018-10-10 NOTE — MR AVS SNAPSHOT
After Visit Summary   10/10/2018    Samy Lu    MRN: [id number]           Patient Information     Date Of Birth          1955        Visit Information        Provider Department      10/10/2018 11:00 AM Jesus Shi MD [facility] [place]        Today's Diagnoses     Colitis          Care Instructions    GUIDE TO YOUR COLONOSCOPY WITH SUPREP    Date of Procedure: 11/19/18 with Dr. Shi  Admit time: Surgery Department will call you the day before your procedure by 5pm with your admit time. If your surgery is on Monday, please expect a call on Friday.  If we were unable to reach you before 5PM, you may call admitting at [phone] or toll free at [phone] ext [phone]  Please call the Registered Nurse in Surgery Education at [phone] or toll free at [phone] one to two weeks before your procedure and have an allergy and medication list ready     Call the surgery nurse or your primary care provider if you should become ill within 1 week of your procedure and we will reschedule it when you are healthy. This includes signs or symptoms of a cold or the flu. This can include fever, chills, sore throat, cough, chest congestions, productive cough, runny nose.     At [facility], we want to make sure that your colonoscopy is as pleasant as possible. This guide is designed to answer any questions you might have and to walk you through the preparations you will need to make before your procedure.  Should you have additional questions, please feel free to contact us. Contact numbers are listed below. Thank you for choosing [facility].    Clinic Health Unit Coordinator: [phone]  Clinic Nurse: [phone] (or [phone]; [phone])  Surgery Education Nurse: [phone] or toll free [phone]    All nursing questions or concerns can be directed to the clinic or surgery education nurse  If you have a scheduling or appointment  "question, or need to postpone your procedure, please call the Health Unit Coordinator between 8am and 4pm Monday through Friday.  After hours or on weekends, please call [phone] to postpone.     COLONOSCOPY PREP    7 DAYS BEFORE THE EXAM:  Do not take Aspirin (325mg)or other NSAIDS (Ibuprofen, Motrin, Aleve, Celebrex, Naproxen, etc) 7 days before your surgery. Tylenol is fine. Stop taking fiber supplements, vitamins, iron or vitamins that contain iron, and herbals.    Do not eat any corn, nuts or seeds.     Arrange transportation with a family member or friend to drive you home and have an adult available to stay with you for the next 4 hours when you arrive home for your safety. If you need to take a taxi or the bus, you MUST have a responsible adult to ride with you OR YOUR PROCEDURE WILL BE CANCELLED. It is recommended that you DO NOT DRIVE for the next 24 hours after receiving anesthesia.      prescriptions at your pharmacy as soon as possible. If it has been more than one week since your appointment was scheduled, please call your pharmacy to verify it is still ready for .     Call the Surgery Education Nurse if you should become ill within 1 week of your procedure and we will reschedule it when you are healthy. This includes sings or symptoms of a cold or the flu. This can include fever, chills, sore throat, cough, chest congestions, productive cough, runny nose.     2 DAYS BEFORE THE EXAM:   Begin a low fiber diet. No raw fruits or vegatables or whole grains.  Please see the list of foods you can have and foods to avoid on page 3 of the separate \"Split-Dose SuPrep\" colonoscopy instruction packet.   Drink at least 4-6 large glasses of sports drink each day (not red or purple).      1 DAY BEFORE THE EXAM:  DO NOT EAT ANY SOLID FOOD OR MILK PRODUCTS AFTER 12:00 AM (MIDNIGHT).  Drink only clear liquids for breakfast, lunch and dinner. (No red or purple colors)  Please see the list of liquids you " "can have and what to avoid on page 2 of the separate \"Split-Dose SuPrep\" colonoscopy instruction packet.   Follow the instructions of your colon preparation.  No red or purple colors, milk products, or alcohol.       AT 6:00 PM THE EVENING PRIOR TO PROCEDURE:  Pour one 16 ounce bottle of SUPREP liquid into the mixing container.  Add cool drinking water to the 16 ounce line on the container and mix.  Note: Be sure to dilute SUPREP before you drink it.  Drink ALL the liquid in the container.    You must drink 2 or more 16 ounce containers of water over the next hour.  Stay near a toilet while using this medication.     DAY OF COLONOSCOPY PROCEDURE:    6 HOURS PRIOR TO THE EXAM (set an alarm):  Pour the 2nd 16 ounce bottle of SUPREP liquid into the mixing container.  Add cool drinking water to the 16 ounce line on the container and mix.  Note: Be sure to dilute SUPREP before you drink it.  Drink ALL the liquid in the container.  You must drink 2 or more 16 ounce containers of water over the next hour.  You should be done with with prep 4 hours before the exam.    You may continue clear liquids until 3 hours prior to exam.   If you must take medication, take it with a sip of water.    Wear comfortable clothes. No jewelry, body piercings, make-up, nail polish, hair spray, lotions, perfumes or colognes. Shower before you arrive.  [place] in Admitting through the [facility].  You must have a  with you and and adult available to stay with your for 4 hours at home. The medicine used in this test will make you sleepy. If you do not have someone, please reschedule or your test will be cancelled.  It is recommended that you do not drive for 24 hours after your test. Do not operate power equipment, drink alcoholic beverages, make important decisions or sign legal documents.     COLONOSCOPY FREQUENTLY ASKED QUESTIONS    What is a colonoscopy?    A colonoscopy is a test to look at the lining of your large intestine. The " "purpose of the exam is to check for abnormalities including growths called \"polyps\" that can lead to serious disease. A flexibles scope is inserted into your rectum by the doctor to examine your large intestine.    What are polyps?  Polyps are abnormal growths on the lining of the colon. Most polyps are not cancerous, but some polyps have the potential to turn into cancer with time. Polyps can also bleed. For these reasons, most polyps are removed during a colonoscopy and sent to the laboratory for microscopic examination.    What preparation is needed?  The colon must be completely clean for the procedure to be performed. You may be given one or two different prep solutions to cleanse your bowel. You will also need to follow a clear liquid diet the day before your procedure.    What happens after the procedure?  After your procedure is complete, you will be taken back to your day surgery room where you will be monitored for approximately 1 hour. You can expect to feel drowsy for several hours afterward. You may experience some cramping or bloating due to the air introduced into your colon during the exam. You will not be able to drive or operate machinery the rest of the day. You will be given written discharge instructions and appropriate learning material before you go home. You must have an adult to stay at home with you for the next 4 hours after you leave the hospital for your safety.    When will I find out the results of my test?  Your surgeon will talk to you and your designated  before you leave and usually the preliminary results can be given to you at that time. If a biopsy was taken during your procedure, it will be sent to the laboratory for examination. Results usually take one week. You will be contacted by phone or by letter with results.      TIPS FOR COLON CLEANSING BEFORE YOUR COLONOSCOPY    To get accurate results from your exam, your colon must be completely clean and empty. Please follow " your doctor's instructions. If you do not, you may need to repeat both the exam and colon-cleansing process.    The medicine you take may cause bloating, nausea and other discomfort. Follow these tips to make the process as easy as possible:     You may use alcohol-free baby wipes to ease anal irritation. You may also use Vaseline to help protect the skin. Other options include Tucks wipes, hemorrhoid treatments and hydrocortisone cream.    To chill the solution, put it in your refrigerator or set it in a bowl of ice. Do not add ice in your drinking glass. You may remove the colon preparation from the refrigerator 15-30 minutes before drinking.    Stay near a toilet! You will have diarrhea (loose watery stools) and may also have chills. Dress for comfort. Expect to feel discomfort until the stool clears from your colon. This usually takes about 2 to 4 hours.    If you followed your doctor's orders and your stool is a clear or yellow liquid, you are ready for the exam. If you are not sure if your colon is clean, please call your clinic and ask to speak to a nurse.         If SuPrep is not covered by insurance and you would like to opt for Golytely as an alternate prep, please call the nurse to request a new prescription to your pharmacy. Sometimes the pharmacy will contact our office in advance if the prescription is not covered. The dietary instructions are the same for both bowel preps. Take Dulcolax 5mg at bedtime 2 nights before procedure and at 3pm day before procedure. Drink 1/2 of the the golytely at 6pm day before procedure 1  8 oz glass every 15 minutes. Repeat with the 2nd 1/2 of Golytely day of procedure 6 hours prior to check in time.                Follow-ups after your visit        Who to contact     If you have questions or need follow up information about today's clinic visit or your schedule please [facility] directly at [phone].  Normal or non-critical lab and  "imaging results will be communicated to you by MyChart, letter or phone within 4 business days after the clinic has received the results. If you do not hear from us within 7 days, please contact the clinic through MyChart or phone. If you have a critical or abnormal lab result, we will notify you by phone as soon as possible.  Submit refill requests through [employer] or call your pharmacy and they will forward the refill request to us. Please allow 3 business days for your refill to be completed.          Additional Information About Your Visit        Care EveryWhere ID     This is your Care EveryWhere ID. This could be used by other organizations to access your [place] medical records  [id number]        Your Vitals Were     Pulse Temperature Height Pulse Oximetry BMI (Body Mass Index)       70 98.1  F (36.7  C) (Tympanic) 5' 9.5\" (1.765 m) 96% 28.53 kg/m2        Blood Pressure from Last 3 Encounters:   10/10/18 118/76   10/01/18 124/68   09/17/18 104/70    Weight from Last 3 Encounters:   10/10/18 196 lb (88.9 kg)   10/01/18 196 lb (88.9 kg)   09/17/18 185 lb (83.9 kg)              We Performed the Following     CBC with platelets and differential        Primary Care Provider Office Phone # Fax #    Reji Flynn [phone]       [address] [place] [id number]        Equal Access to Services     [facility] AH: Hadii lyndsey ku hadasho Sotayali, waaxda luqadaha, qaybta kaalmada ademargieda, nicho price . So [facility] [phone].    ATENCIÓN: Si habla español, tiene a pickering disposición servicios gratuitos de asistencia lingüística. Llame al [phone].    We comply with applicable federal civil rights laws and [place] laws. We do not discriminate on the basis of race, color, national origin, age, disability, sex, sexual orientation, or gender identity.            Thank you!     Thank you for choosing [facility]  for your care. Our goal is " always to provide you with excellent care. Hearing back from our patients is one way we can continue to improve our services. Please take a few minutes to complete the written survey that you may receive in the mail after your visit with us. Thank you!             Your Updated Medication List - Protect others around you: Learn how to safely use, store and throw away your medicines at www.disposemymeds.org.          This list is accurate as of 10/10/18 11:41 AM.  Always use your most recent med list.                   Brand Name Dispense Instructions for use Diagnosis    ZINC PO      Take by mouth as needed

## 2018-10-10 NOTE — NURSING NOTE
"Chief Complaint   Patient presents with     Consult     Consult for colitis, Dr. Flynn referring.       Initial /76 (BP Location: Right arm, Cuff Size: Adult Large)  Pulse 70  Temp 98.1  F (36.7  C) (Tympanic)  Ht 5' 9.5\" (1.765 m)  Wt 196 lb (88.9 kg)  SpO2 96%  BMI 28.53 kg/m2 Estimated body mass index is 28.53 kg/(m^2) as calculated from the following:    Height as of this encounter: 5' 9.5\" (1.765 m).    Weight as of this encounter: 196 lb (88.9 kg).  Medication Reconciliation: complete    Ana Mcallister LPN  "

## 2018-10-10 NOTE — PROGRESS NOTES
[facility] Surgery Consultation    CC:  Colitis    HPI:  This 63 year old year old male is seen for evaluation of colitis.  The history is obtained from the patient, and reviewing the medical record.  He is good medical historian. He says that earlier this fall he developed some left sided abdominal pain. He was seen and evaluated in the emergency room where he was found to have colitis of his descending colon. He was placed on antibiotics and he says that the pain slowly improved. He was then sent to surgery for evaluation. He says that there have been times in the past where he would have left sided pain and difficulty with bowel movements. He has no blood in his stool or dark tarry stools. He has been having smaller stools lately. The past couple of days he started to have recurrent LLQ abdominal pain. He has no fevers or chills.    No past medical history on file.    Past Surgical History:   Procedure Laterality Date     ABDOMEN SURGERY      right IHR     ABDOMEN SURGERY      2 hernia repairs     ABDOMEN SURGERY      Umbilical hernia repair     COLONOSCOPY  10/21/2009    Due 2014       Pt denied problems with bleeding or anesthesia    Prior to Admission medications    Medication Sig Start Date End Date Taking? Authorizing Provider   Multiple Vitamins-Minerals (ZINC PO) Take by mouth as needed   Yes Reported, Patient        Not on File      HABITS:    Social History   Substance Use Topics     Smoking status: Former Smoker     Types: Cigarettes     Smokeless tobacco: Never Used     Alcohol use Yes      Comment: rare     No mood altering drug use.    Family History   Problem Relation Age of Onset     Cancer Father      Non Hodgkins Lymphoma     Diabetes Paternal Grandmother        REVIEW OF SYSTEMS:  Ten point review of systems negative except those mentioned in the HPI.     The patient denies sleep apnea, latex allergies or MRSA    OBJECTIVE:    /76 (BP Location: Right arm, Cuff Size: Adult Large)   "Pulse 70  Temp 98.1  F (36.7  C) (Tympanic)  Ht 1.765 m (5' 9.5\")  Wt 88.9 kg (196 lb)  SpO2 96%  BMI 28.53 kg/m2    GENERAL: Generally appears well, in no distress with appropriate affect.  HEENT:   Sclerae anicteric - No cervical, supra/infraclavicular lymphadenopathy, no thyroid masses  Respiratory:  Lungs clear to ausculation bilaterally with good air excursion  Cardiovascular:  Regular Rate and Rhythm with no murmurs gallops or rubs, normal   Abdomen: soft, NT/ND  :  deferred  Extremities:  Extremities normal. No deformities, edema, or skin discoloration.  Skin:  no suspicious lesions or rashes  Neurological: grossly intact  Psych:  Alert, oriented, affect appropriate with normal decision making ability.      IMPRESSION:  62 yo male with colitis    PLAN:  At this time I recommend that he undergo endoscopic surveillance with biopsies. The indications, risks, benefits and technical aspects of whole colon colonoscopy were outlined with risks including, but not limited to, perforation, bleeding and inability to visualize entire colon.  Management of each was reviewed.  The need of mechanical preparation of the colon was reviewed along with the use of monitored anesthetic care.  The patient's questions were asked and answered.  Scheduled first available date.        Thank you for allowing me to participate in the care of your patient.       Jesus Shi MD    10/10/2018  1:25 PM    cc:  Reji Flynn    "

## 2018-11-18 ENCOUNTER — ANESTHESIA EVENT (OUTPATIENT)
Dept: SURGERY | Facility: HOSPITAL | Age: 63
End: 2018-11-18
Payer: COMMERCIAL

## 2018-11-18 ASSESSMENT — LIFESTYLE VARIABLES: TOBACCO_USE: 1

## 2018-11-18 NOTE — ANESTHESIA PREPROCEDURE EVALUATION
Anesthesia Evaluation     . Pt has had prior anesthetic.     No history of anesthetic complications          ROS/MED HX    ENT/Pulmonary:     (+)tobacco use, Past use , . .    Neurologic:  - neg neurologic ROS     Cardiovascular:  - neg cardiovascular ROS       METS/Exercise Tolerance:     Hematologic:  - neg hematologic  ROS       Musculoskeletal:  - neg musculoskeletal ROS       GI/Hepatic:     (+) bowel prep,       Renal/Genitourinary:  - ROS Renal section negative       Endo:  - neg endo ROS       Psychiatric:  - neg psychiatric ROS       Infectious Disease:  - neg infectious disease ROS       Malignancy:      - no malignancy   Other:    - neg other ROS                 Physical Exam      Airway   Mallampati: III  TM distance: >3 FB  Neck ROM: full    Dental   (+) upper dentures, lower dentures and missing    Cardiovascular   Rhythm and rate: regular and normal      Pulmonary    breath sounds clear to auscultation                    Anesthesia Plan      History & Physical Review  History and physical reviewed and following examination; no interval change.    ASA Status:  2 .    NPO Status:  > 8 hours    Plan for MAC with Intravenous and Propofol induction. Maintenance will be TIVA.  Reason for MAC:  Deep or markedly invasive procedure (G8) and Difficulty with conscious sedation (QS)  PONV prophylaxis:  Ondansetron (or other 5HT-3)  H and P date 10/10/18   Surgeon requests deep sedation. Patient has a Mallampati 3 airway. Will provide MAC.      Postoperative Care  Postoperative pain management:  IV analgesics.      Consents  Anesthetic plan, risks, benefits and alternatives discussed with:  Patient..                          .

## 2018-11-19 ENCOUNTER — ANESTHESIA (OUTPATIENT)
Dept: SURGERY | Facility: HOSPITAL | Age: 63
End: 2018-11-19
Payer: COMMERCIAL

## 2018-11-19 ENCOUNTER — HOSPITAL ENCOUNTER (OUTPATIENT)
Facility: HOSPITAL | Age: 63
Discharge: HOME | End: 2018-11-19
Attending: SURGERY | Admitting: SURGERY
Payer: COMMERCIAL

## 2018-11-19 ENCOUNTER — SURGERY (OUTPATIENT)
Age: 63
End: 2018-11-19

## 2018-11-19 VITALS
DIASTOLIC BLOOD PRESSURE: 77 MMHG | OXYGEN SATURATION: 98 % | SYSTOLIC BLOOD PRESSURE: 110 MMHG | TEMPERATURE: 97.8 F | RESPIRATION RATE: 16 BRPM

## 2018-11-19 PROCEDURE — 40000305 ZZH STATISTIC PRE PROC ASSESS I: Performed by: SURGERY

## 2018-11-19 PROCEDURE — 88305 TISSUE EXAM BY PATHOLOGIST: CPT | Mod: TC | Performed by: SURGERY

## 2018-11-19 PROCEDURE — 37000009 ZZH ANESTHESIA TECHNICAL FEE, EACH ADDTL 15 MIN: Performed by: SURGERY

## 2018-11-19 PROCEDURE — 36000052 ZZH SURGERY LEVEL 2 EA 15 ADDTL MIN: Performed by: SURGERY

## 2018-11-19 PROCEDURE — 37000008 ZZH ANESTHESIA TECHNICAL FEE, 1ST 30 MIN: Performed by: SURGERY

## 2018-11-19 PROCEDURE — 45385 COLONOSCOPY W/LESION REMOVAL: CPT | Performed by: ANESTHESIOLOGY

## 2018-11-19 PROCEDURE — 25000128 H RX IP 250 OP 636: Performed by: NURSE ANESTHETIST, CERTIFIED REGISTERED

## 2018-11-19 PROCEDURE — 71000027 ZZH RECOVERY PHASE 2 EACH 15 MINS: Performed by: SURGERY

## 2018-11-19 PROCEDURE — 27210794 ZZH OR GENERAL SUPPLY STERILE: Performed by: SURGERY

## 2018-11-19 PROCEDURE — 45380 COLONOSCOPY AND BIOPSY: CPT | Performed by: SURGERY

## 2018-11-19 PROCEDURE — 36000050 ZZH SURGERY LEVEL 2 1ST 30 MIN: Performed by: SURGERY

## 2018-11-19 PROCEDURE — 01999 UNLISTED ANES PROCEDURE: CPT | Performed by: NURSE ANESTHETIST, CERTIFIED REGISTERED

## 2018-11-19 RX ORDER — NALOXONE HYDROCHLORIDE 0.4 MG/ML
.1-.4 INJECTION, SOLUTION INTRAMUSCULAR; INTRAVENOUS; SUBCUTANEOUS
Status: CANCELLED | OUTPATIENT
Start: 2018-11-19 | End: 2018-11-20

## 2018-11-19 RX ORDER — SODIUM CHLORIDE, SODIUM LACTATE, POTASSIUM CHLORIDE, CALCIUM CHLORIDE 600; 310; 30; 20 MG/100ML; MG/100ML; MG/100ML; MG/100ML
INJECTION, SOLUTION INTRAVENOUS CONTINUOUS
Status: DISCONTINUED | OUTPATIENT
Start: 2018-11-19 | End: 2018-11-19 | Stop reason: HOSPADM

## 2018-11-19 RX ORDER — MEPERIDINE HYDROCHLORIDE 50 MG/ML
12.5 INJECTION INTRAMUSCULAR; INTRAVENOUS; SUBCUTANEOUS
Status: DISCONTINUED | OUTPATIENT
Start: 2018-11-19 | End: 2018-11-19 | Stop reason: HOSPADM

## 2018-11-19 RX ORDER — ONDANSETRON 4 MG/1
4 TABLET, ORALLY DISINTEGRATING ORAL EVERY 30 MIN PRN
Status: DISCONTINUED | OUTPATIENT
Start: 2018-11-19 | End: 2018-11-19 | Stop reason: HOSPADM

## 2018-11-19 RX ORDER — ONDANSETRON 2 MG/ML
4 INJECTION INTRAMUSCULAR; INTRAVENOUS EVERY 30 MIN PRN
Status: DISCONTINUED | OUTPATIENT
Start: 2018-11-19 | End: 2018-11-19 | Stop reason: HOSPADM

## 2018-11-19 RX ORDER — LIDOCAINE 40 MG/G
CREAM TOPICAL
Status: DISCONTINUED | OUTPATIENT
Start: 2018-11-19 | End: 2018-11-19 | Stop reason: HOSPADM

## 2018-11-19 RX ORDER — NALOXONE HYDROCHLORIDE 0.4 MG/ML
.1-.4 INJECTION, SOLUTION INTRAMUSCULAR; INTRAVENOUS; SUBCUTANEOUS
Status: DISCONTINUED | OUTPATIENT
Start: 2018-11-19 | End: 2018-11-19 | Stop reason: HOSPADM

## 2018-11-19 RX ORDER — FLUMAZENIL 0.1 MG/ML
0.2 INJECTION, SOLUTION INTRAVENOUS
Status: CANCELLED | OUTPATIENT
Start: 2018-11-19 | End: 2018-11-20

## 2018-11-19 RX ORDER — PROPOFOL 10 MG/ML
INJECTION, EMULSION INTRAVENOUS PRN
Status: DISCONTINUED | OUTPATIENT
Start: 2018-11-19 | End: 2018-11-19

## 2018-11-19 RX ADMIN — PROPOFOL 100 MG: 10 INJECTION, EMULSION INTRAVENOUS at 12:26

## 2018-11-19 RX ADMIN — PROPOFOL 30 MG: 10 INJECTION, EMULSION INTRAVENOUS at 12:29

## 2018-11-19 RX ADMIN — PROPOFOL 50 MG: 10 INJECTION, EMULSION INTRAVENOUS at 12:34

## 2018-11-19 RX ADMIN — PROPOFOL 40 MG: 10 INJECTION, EMULSION INTRAVENOUS at 12:45

## 2018-11-19 RX ADMIN — SODIUM CHLORIDE, POTASSIUM CHLORIDE, SODIUM LACTATE AND CALCIUM CHLORIDE: 600; 310; 30; 20 INJECTION, SOLUTION INTRAVENOUS at 11:30

## 2018-11-19 RX ADMIN — PROPOFOL 50 MG: 10 INJECTION, EMULSION INTRAVENOUS at 12:37

## 2018-11-19 RX ADMIN — PROPOFOL 40 MG: 10 INJECTION, EMULSION INTRAVENOUS at 12:51

## 2018-11-19 RX ADMIN — PROPOFOL 50 MG: 10 INJECTION, EMULSION INTRAVENOUS at 12:27

## 2018-11-19 RX ADMIN — PROPOFOL 40 MG: 10 INJECTION, EMULSION INTRAVENOUS at 12:41

## 2018-11-19 NOTE — DISCHARGE INSTRUCTIONS
Post-Anesthesia Patient Instructions    IMMEDIATELY FOLLOWING SURGERY:  Do not drive or operate machinery for the first twenty four hours after surgery.  Do not make any important decisions for twenty four hours after surgery or while taking narcotic pain medications or sedatives.  If you develop intractable nausea and vomiting or a severe headache please notify your doctor immediately.    FOLLOW-UP:  Please make an appointment with your surgeon as instructed. You do not need to follow up with anesthesia unless specifically instructed to do so.    WOUND CARE INSTRUCTIONS (if applicable):  Keep a dry clean dressing on the anesthesia/puncture wound site if there is drainage.  Once the wound has quit draining you may leave it open to air.  Generally you should leave the bandage intact for twenty four hours unless there is drainage.  If the epidural site drains for more than 36-48 hours please call the anesthesia department.    QUESTIONS?:  Please feel free to call your physician or the hospital  if you have any questions, and they will be happy to assist you.           INSTRUCTIONS AFTER COLONOSCOPY    WHEN YOU ARE BACK HOME:    Plan to rest for an hour or two after you get home.    You may have some cramping or pressure until you pass gas.    You may resume your regular medications.    Eat a small, light meal at first, and then gradually return to normal meal sizes.  If you had a polyp removed:    Slight bleeding may occur.  You may have a slight blood stain on the toilet paper after a bowel movement.    To lessen the chance of bleeding, avoid heavy exercise for ONE WEEK.  This includes heavy lifting, vigorous sport activities, and heavy physical labor.  You may resume your normal sexual activity.      Avoid aspirin or aspirin products if instructed by your doctor.    WHAT TO WATCH FOR:  Problems rarely occur after the exam; however, it is important for you to watch for early signs of possible  problems.  If you have     Unusual pain in your abdomen    Nausea and vomiting that persists    Excessive bleeding    Black or bloody bowel movements    Fever or temperature above 100.6 F  Please call your doctor ([facility] [phone]) or go to the nearest hospital emergency room.

## 2018-11-19 NOTE — H&P
Surgery Consult Clinic Note      RE: Samy Lu  : 1955      Chief Complaint:  Colitis    History of Present Illness:  Dr. Shi originally saw Mr. Lu on 10/10/18 for evaluation regarding screening colonoscopy.  Please refer to that note for further detail.  He is here today to update his H&P.  Samy is scheduled for colonoscopy on 18, which is outside the 30 day anesthesia clearance guidelines.  This was done because of scheduling availability.  He has no questions regarding  bowel prep.  Reports passing clear liquid stools today.  He specifically denies fevers, chills, nausea, vomiting, chest pain, shortness of breath, palpitations, sore throat, cough, or generalized feeling ill.      Medical history:  History reviewed. No pertinent past medical history.    Surgical history:  Past Surgical History:   Procedure Laterality Date     ABDOMEN SURGERY      right IHR     ABDOMEN SURGERY      2 hernia repairs     ABDOMEN SURGERY      Umbilical hernia repair     COLONOSCOPY  10/21/2009    Due 2014       Family history:  Family History   Problem Relation Age of Onset     Cancer Father      Non Hodgkins Lymphoma     Diabetes Paternal Grandmother        Medications:  Prior to Admission medications    Medication Sig Start Date End Date Taking? Authorizing Provider   Multiple Vitamins-Minerals (ZINC PO) Take by mouth as needed    Reported, Patient   Na Sulfate-K Sulfate-Mg Sulf (SUPREP BOWEL PREP KIT) solution Drink 1 bottle 6PM prior to procedure followed by 2 16 oz glasses water over next hour. Repeat with 2nd bottle 6 hours prior to procedure 10/10/18   Jesus Shi MD     Allergies:  The patient has no allergies on file.  .  Social history:  Social History   Substance Use Topics     Smoking status: Former Smoker     Types: Cigarettes     Smokeless tobacco: Never Used     Alcohol use Yes      Comment: rare     Marital status: .      Review of Systems:    Constitutional:  Negative for fever, chills and weight loss.   HENT: Negative for ear pain, nosebleeds, congestion, sore throat, tinnitus and ear discharge.    Eyes: Negative for blurred vision, double vision, photophobia and pain.   Respiratory: Negative for cough, hemoptysis, shortness of breath, wheezing and stridor.    Cardiovascular: Negative for chest pain, palpitations and orthopnea.   Gastrointestinal: Negative for heartburn, nausea, vomiting, abdominal pain and blood in stool.   Genitourinary: Negative for urgency, frequency and hematuria.   Musculoskeletal: Negative for myalgias, back pain and joint pain.   Neurological: Negative for tingling, speech change and headaches.   Endo/Heme/Allergies: Does not bruise/bleed easily.   Psychiatric/Behavioral: Negative for depression, suicidal ideas and hallucinations. The patient is not nervous/anxious.    Physical Examination:  /85  Temp 97.8  F (36.6  C)  Resp 20  SpO2 97%  General: Alert and orientedx4, no acute distress, well-developed/well-nourished, ambulating without assistance  HEENT: normocephalic atraumatic, extraocular movements intact, PERRL Sclerae anicteric; Trachea mideline, no JVD  Chest:   Clear to auscultation bilaterally.  Cardiac: S1S2 , regular rate and rhythm without additional sounds  Abdomen: Soft, non-tender, non-distended  Extremities: Cursory exam unremarkable.  No peripheral edema noted.  Skin: Warm, dry, < 2 sec cap refill  Neuro: CN 2-12 grossly intact, no focal deficit, GCS 15  Psych: Pleasant, calm, asks appropriate questions      Assessment/Plan:  #1 Colonoscopy  #2 Personal history of colon polyp  #3 Colitis      Samy Lu and I had a discussion about colonoscopies.  The indications, risks, benefits, althernatives and technical aspects of whole colon colonoscopy were outlined with risks including, but not limited to, perforation, bleeding and inability to visualize entire colon.  Management of each was reviewed including the risk  for life saving surgery and possible admittance to the hospital.  His questions were asked and answered.  We will proceed with exam as scheduled.  Natasha Montanez [facility]  [address] [place] [address]    [id number]    Referring Provider:  No referring provider defined for this encounter.     Primary Care Provider:  Reji Flynn

## 2018-11-19 NOTE — IP AVS SNAPSHOT
MRN:[id number]                      After Visit Summary   11/19/2018    Samy Lu    MRN: [id number]           Thank you!     Thank you for choosing [facility] for your care. Our goal is always to provide you with excellent care. Hearing back from our patients is one way we can continue to improve our services. Please take a few minutes to complete the written survey that you may receive in the mail after you visit with us. Thank you!        Patient Information     Date Of Birth          1955        About your hospital stay     You were admitted on:  November 19, 2018 You last received care in the:  [facility]    You were discharged on:  November 19, 2018       Who to Call     For medical emergencies, please call 911.  For non-urgent questions about your medical care, please call your primary care provider or clinic, [phone]  For questions related to your surgery, please call your surgery clinic        Attending Provider     Provider Jesus Mayo MD Surgery       Primary Care Provider Office Phone # Fax #    Reji Flynn [phone]      After Care Instructions     Discharge Instructions       Resume pre procedure diet            Discharge Instructions       Restart home medications.                  Further instructions from your care team           Post-Anesthesia Patient Instructions    IMMEDIATELY FOLLOWING SURGERY:  Do not drive or operate machinery for the first twenty four hours after surgery.  Do not make any important decisions for twenty four hours after surgery or while taking narcotic pain medications or sedatives.  If you develop intractable nausea and vomiting or a severe headache please notify your doctor immediately.    FOLLOW-UP:  Please make an appointment with your surgeon as instructed. You do not need to follow up with anesthesia unless specifically instructed to do so.    WOUND CARE INSTRUCTIONS (if  applicable):  Keep a dry clean dressing on the anesthesia/puncture wound site if there is drainage.  Once the wound has quit draining you may leave it open to air.  Generally you should leave the bandage intact for twenty four hours unless there is drainage.  If the epidural site drains for more than 36-48 hours please call the anesthesia department.    QUESTIONS?:  Please feel free to call your physician or the hospital  if you have any questions, and they will be happy to assist you.           INSTRUCTIONS AFTER COLONOSCOPY    WHEN YOU ARE BACK HOME:    Plan to rest for an hour or two after you get home.    You may have some cramping or pressure until you pass gas.    You may resume your regular medications.    Eat a small, light meal at first, and then gradually return to normal meal sizes.  If you had a polyp removed:    Slight bleeding may occur.  You may have a slight blood stain on the toilet paper after a bowel movement.    To lessen the chance of bleeding, avoid heavy exercise for ONE WEEK.  This includes heavy lifting, vigorous sport activities, and heavy physical labor.  You may resume your normal sexual activity.      Avoid aspirin or aspirin products if instructed by your doctor.    WHAT TO WATCH FOR:  Problems rarely occur after the exam; however, it is important for you to watch for early signs of possible problems.  If you have     Unusual pain in your abdomen    Nausea and vomiting that persists    Excessive bleeding    Black or bloody bowel movements    Fever or temperature above 100.6 F  Please call your doctor ([facility] [phone]) or go to the nearest hospital emergency room.    Pending Results     No orders found from 11/17/2018 to 11/20/2018.            Admission Information     Date & Time Provider Department Dept. Phone    11/19/2018 Jesus Shi MD [facility] [phone]      Your Vitals Were     Blood Pressure Temperature Respirations Pulse Oximetry           83/56 97.8  F (36.6  C) 16 97%        Care EveryWhere ID     This is your Care EveryWhere ID. This could be used by other organizations to access your [facility] medical records  [id number]        Equal Access to Services     YESSENIA GLASGOW: Hadii aad ku haddimitridaniela Vikram, waaxda luqadaha, qaybta kaalmada adephoenix, nicho malikin hayaaburke pardolora whitman albert glasgow. So [facility] [phone].    ATENCIÓN: Si habla español, tiene a pickering disposición servicios gratuitos de asistencia lingüística. Llame al [phone].    We comply with applicable federal civil rights laws and [place] laws. We do not discriminate on the basis of race, color, national origin, age, disability, sex, sexual orientation, or gender identity.               Review of your medicines      CONTINUE these medicines which have NOT CHANGED        Dose / Directions    ZINC PO        Take by mouth as needed   Refills:  0         STOP taking     Na Sulfate-K Sulfate-Mg Sulf solution   Commonly known as:  SUPREP BOWEL PREP KIT                    Protect others around you: Learn how to safely use, store and throw away your medicines at www.disposemymeds.org.             Medication List: This is a list of all your medications and when to take them. Check marks below indicate your daily home schedule. Keep this list as a reference.      Medications           Morning Afternoon Evening Bedtime As Needed    ZINC PO   Take by mouth as needed

## 2018-11-19 NOTE — OP NOTE
Samy Lu MRN# [id number]   YOB: 1955 Age: 63 year old      Date of Admission:  11/19/2018    Primary care provider: Reji Flynn    PREOPERATIVE DIAGNOSIS:  Diagnotic colonoscopy.         POSTOPERATIVE DIAGNOSIS:  Diffuse mucosal irritation to the descending and sigmoid colon, ascending colon polyp          PROCEDURE:  Whole colon colonoscopy with polypectomy, retention clip application, and random biopsies.         INDICATIONS:  This 63 year old male presents for diagnostic colonoscopy after having a long bout of colitis.      OPERATIVE FINDINGS:  There was one small polyp in the ascending colon that was removed and to control bleeding a clip was applied. In the descending and sigmoid colon there was mucosal irritation with no evidence of ulceration or strictures.        DESCRIPTION OF PROCEDURE:  With the patient in the supine position on the transport cart, IV sedation was administered by the nurse anesthetist.  His correct identity and planned procedure were confirmed during the requisite timeout pause and he was rolled to the left lateral position.  The anus was digitally dilated and the fiberoptic colonoscope was introduced and negotiated through the length of the colon to the cecal base.  The cecum was intubated and its landmarks clearly identified. Upon withdrawal there was a small polyp in the ascending colon which was identified and removed with cold forceps. There was bleeding form the polypectomy site which was controlled with a clip. The area was again inspected and hemostasis was maintained. A circumferential examination of the mucosa on introduction of the colonoscope and on its slow withdrawal confirmed the absence of neoplasia and/or stricture. In the descending colon and sigmoid colon there was extensive mucosal irritation. Multiple random biopsies were taken from the descending and sigmoid colon. There were multiple large diverticuli noted.  Retroflex in the rectal  ampulla showed no evidence of pathology.  Air was aspirated and the colonoscope was withdrawn; the patient was returned to day surgery in good condition, without suggestion of complication.   The post surgical debriefing was held and acknowledged at completion.          Jesus Shi MD     11/19/2018 12:57 PM

## 2018-11-19 NOTE — OR NURSING
Patient and responsible adult given discharge instructions with no questions regarding instructions. Vale score 20. Pain level 0/10.  Discharged from unit via walking. Patient discharged to home with wife.

## 2018-11-19 NOTE — ANESTHESIA CARE TRANSFER NOTE
Patient: Samy Lu    Procedure(s):  COLONOSCOPY With POLYPECTOMY,RESOLUTION CLIP, BIOPSY    Diagnosis: COLITIS  Diagnosis Additional Information: No value filed.    Anesthesia Type:   MAC     Note:  Airway :Nasal Cannula  Patient transferred to:Phase II  Handoff Report: Identifed the Patient, Identified the Reponsible Provider, Reviewed the pertinent medical history, Discussed the surgical course, Reviewed Intra-OP anesthesia mangement and issues during anesthesia, Set expectations for post-procedure period and Allowed opportunity for questions and acknowledgement of understanding      Vitals: (Last set prior to Anesthesia Care Transfer)    CRNA VITALS  11/19/2018 1228 - 11/19/2018 1258      11/19/2018             Resp Rate (observed): (!)  2    Resp Rate (set): 8                Electronically Signed By: ANAI Chandler CRNA  November 19, 2018  12:58 PM

## 2018-11-19 NOTE — IP AVS SNAPSHOT
[facility] Operating Room    [address] [place] [id number]    Phone:  [phone]                                       After Visit Summary   11/19/2018    Samy Lu    MRN: [id number]           After Visit Summary Signature Page     I have received my discharge instructions, and my questions have been answered. I have discussed any challenges I see with this plan with the nurse or doctor.    ..........................................................................................................................................  Patient/Patient Representative Signature      ..........................................................................................................................................  Patient Representative Print Name and Relationship to Patient    ..................................................               ................................................  Date                                   Time    ..........................................................................................................................................  Reviewed by Signature/Title    ...................................................              ..............................................  Date                                               Time          22EPIC Rev 08/18

## 2018-11-20 LAB — COPATH REPORT: NORMAL

## 2018-11-20 NOTE — ANESTHESIA POSTPROCEDURE EVALUATION
Patient: Samy Lu    Procedure(s):  COLONOSCOPY With POLYPECTOMY,RESOLUTION CLIP, BIOPSY    Diagnosis:COLITIS  Diagnosis Additional Information: No value filed.    Anesthesia Type:  MAC    Note:  Anesthesia Post Evaluation    Patient location during evaluation: Phase 2 and Bedside  Patient participation: Able to fully participate in evaluation  Level of consciousness: awake and alert  Pain management: adequate  Airway patency: patent  Cardiovascular status: acceptable  Respiratory status: acceptable  Hydration status: stable  PONV: none     Anesthetic complications: None          Last vitals:  Vitals:    11/19/18 1300 11/19/18 1305 11/19/18 1315   BP: 111/81 (!) 83/56 110/77   Resp:  16 16   Temp:      SpO2:  97% 98%         Electronically Signed By: Ramon Aguilar MD  November 20, 2018  8:58 AM

## 2020-04-01 ENCOUNTER — NURSE TRIAGE (OUTPATIENT)
Dept: FAMILY MEDICINE | Facility: OTHER | Age: 65
End: 2020-04-01

## 2020-04-01 NOTE — TELEPHONE ENCOUNTER
Reason for Disposition    [1] Constant abdominal pain AND [2] present > 2 hours  (NO pain or tenderness of hernia)    Additional Information    Negative: [1] Swelling of scrotum AND [2] has not previously been diagnosed with a hernia    Negative: SEVERE abdominal pain    Negative: Hernia is painful or tender to touch    Negative: [1] Vomiting AND [2] can't reduce the hernia    Negative: [1] Vomiting AND [2] abdomen looks much more swollen than usual    Negative: [1] Swollen lump in groin AND [2] pulsating (like heartbeat)    Negative: Patient sounds very sick or weak to the triager    Protocols used: HERNIA-A-AH

## 2020-04-01 NOTE — TELEPHONE ENCOUNTER
Pt called, reports L groin pain that started in the last few days. Pt reports history of multiple hernia repairs to this area. Reports that when pain started he felt a tearing sensation to L groin area. Pt is worried that the hernia mesh has torn. States that he can feel a bulge in the area. Bulge is not visible per pt report. Pt reports difficulty having a BM and urinating yesterday but this issue seems to be resolved today. Pt reports fever yesterday and possible low grade temp today. Abdomen/groin area do not appear swollen or red. Pt was advised to be seen in ER due to concern for tearing of hernia mesh. Pt agrees with this plan.

## 2021-02-19 NOTE — PATIENT INSTRUCTIONS
Patient Education   Personalized Prevention Plan  You are due for the preventive services outlined below.  Your care team is available to assist you in scheduling these services.  If you have already completed any of these items, please share that information with your care team to update in your medical record.  Health Maintenance Due   Topic Date Due     HIV Screening  05/24/1970     Hepatitis C Screening  05/24/1973     Diptheria Tetanus Pertussis (DTAP/TDAP/TD) Vaccine (1 - Tdap) 05/24/1980     Zoster (Shingles) Vaccine (1 of 2) 05/24/2005     Cholesterol Lab  03/01/2018     Anxiety Assessment  10/01/2019     Depression Assessment  10/01/2019     FALL RISK ASSESSMENT  05/24/2020     Pneumococcal Vaccine (1 of 1 - PPSV23) 05/24/2020     Flu Vaccine (1) 09/01/2020     PHQ-2  01/01/2021

## 2021-02-19 NOTE — PROGRESS NOTES
"  SUBJECTIVE:   Samy Lu is a 65 year old male who presents for Preventive Visit.      Patient has been advised of split billing requirements and indicates understanding: Yes  Are you in the first 12 months of your Medicare Part B coverage?  Yes,      Physical Health:    In general, how would you rate your overall physical health? good    Outside of work, how many days during the week do you exercise? none    Outside of work, approximately how many minutes a day do you exercise?not applicable    If you drink alcohol do you typically have >3 drinks per day or >7 drinks per week? No    Do you usually eat at least 4 servings of fruit and vegetables a day, include whole grains & fiber and avoid regularly eating high fat or \"junk\" foods? Yes    Do you have any problems taking medications regularly?  No    Do you have any side effects from medications? not applicable    Needs assistance for the following daily activities: no assistance needed    Which of the following safety concerns are present in your home?  none identified     Hearing impairment: No    In the past 6 months, have you been bothered by leaking of urine? no    Mental Health:    In general, how would you rate your overall mental or emotional health? good  PHQ-2 Score:      Do you feel safe in your environment? Yes    Have you ever done Advance Care Planning? (For example, a Health Directive, POLST, or a discussion with a medical provider or your loved ones about your wishes): No, advance care planning information given to patient to review.  Patient declined advance care planning discussion at this time.    Additional concerns to address?  No    Fall risk:  Fallen 2 or more times in the past year?: No  Any fall with injury in the past year?: No    Cognitive Screenin) Repeat 3 items (Leader, Season, Table)    2) Clock draw: NORMAL  3) 3 item recall: Recalls 1 object   Results: NORMAL clock, 1-2 items recalled: COGNITIVE IMPAIRMENT LESS " LIKELY    Mini-CogTM Copyright MAX Partida. Licensed by the author for use in [facility]; reprinted with permission (elkin@.[facility]). All rights reserved.      Do you have sleep apnea, excessive snoring or daytime drowsiness?: no            Reviewed and updated as needed this visit by clinical staff  Tobacco  Allergies  Meds              Reviewed and updated as needed this visit by Provider                Social History     Tobacco Use     Smoking status: Former Smoker     Types: Cigarettes     Smokeless tobacco: Never Used   Substance Use Topics     Alcohol use: Yes     Comment: rare                           Current providers sharing in care for this patient include:   Patient Care Team:  Reji Flynn MD as PCP - General (Family Practice)  Reji Flynn MD as Assigned PCP    The following health maintenance items are reviewed in Epic and correct as of today:  Health Maintenance   Topic Date Due     HIV SCREENING  05/24/1970     HEPATITIS C SCREENING  05/24/1973     DTAP/TDAP/TD IMMUNIZATION (1 - Tdap) 05/24/1980     ZOSTER IMMUNIZATION (1 of 2) 05/24/2005     LIPID  03/01/2018     BOBBY ASSESSMENT  10/01/2019     PHQ-9  10/01/2019     FALL RISK ASSESSMENT  05/24/2020     Pneumococcal Vaccine: 65+ Years (1 of 1 - PPSV23) 05/24/2020     INFLUENZA VACCINE (1) 09/01/2020     PHQ-2  01/01/2021     ADVANCE CARE PLANNING  03/01/2022     MEDICARE ANNUAL WELLNESS VISIT  03/03/2022     COLORECTAL CANCER SCREENING  11/19/2028     AORTIC ANEURYSM SCREENING (SYSTEM ASSIGNED)  Completed     Pneumococcal Vaccine: Pediatrics (0 to 5 Years) and At-Risk Patients (6 to 64 Years)  Aged Out     IPV IMMUNIZATION  Aged Out     MENINGITIS IMMUNIZATION  Aged Out     HEPATITIS B IMMUNIZATION  Aged Out     Lab work is in process      ROS:  CONSTITUTIONAL: NEGATIVE for fever, chills, change in weight  INTEGUMENTARY/SKIN: NEGATIVE for worrisome rashes, moles or lesions  EYES: NEGATIVE for vision changes or  "irritation  ENT/MOUTH: NEGATIVE for ear, mouth and throat problems  RESP: NEGATIVE for significant cough or SOB  BREAST: NEGATIVE for masses, tenderness or discharge  CV: NEGATIVE for chest pain, palpitations or peripheral edema  GI: some constipation.     male :negative for dysuria, hematuria, decreased urinary stream, erectile dysfunction and nocturia x 2  MUSCULOSKELETAL: NEGATIVE for significant arthralgias or myalgia  NEURO: NEGATIVE for weakness, dizziness or paresthesias  ENDOCRINE: NEGATIVE for temperature intolerance, skin/hair changes  HEME: NEGATIVE for bleeding problems  PSYCHIATRIC: NEGATIVE for changes in mood or affect    OBJECTIVE:   /70   Pulse 64   Temp 96.6  F (35.9  C)   Ht 1.74 m (5' 8.5\")   Wt 83 kg (183 lb)   SpO2 98%   BMI 27.42 kg/m   Estimated body mass index is 27.42 kg/m  as calculated from the following:    Height as of this encounter: 1.74 m (5' 8.5\").    Weight as of this encounter: 83 kg (183 lb).  EXAM:   GENERAL: healthy, alert and no distress  EYES: Eyes grossly normal to inspection, PERRL and conjunctivae and sclerae normal  HENT: ear canals and TM's normal, nose and mouth without ulcers or lesions  NECK: no adenopathy, no asymmetry, masses, or scars and thyroid normal to palpation  RESP: lungs clear to auscultation - no rales, rhonchi or wheezes  CV: regular rate and rhythm, normal S1 S2, no S3 or S4, no murmur, click or rub, no peripheral edema and peripheral pulses strong  ABDOMEN: soft, nontender, no hepatosplenomegaly, no masses and bowel sounds normal   (male): normal male genitalia without lesions or urethral discharge, no hernia  RECTAL: normal sphincter tone, no rectal masses, prostate normal size, smooth, nontender without nodules or masses.  No rectal mass.  Some external tags and probable internal hemorrhoids.  MS: no gross musculoskeletal defects noted, no edema  SKIN: no suspicious lesions or rashes  NEURO: Normal strength and tone, mentation intact " "and speech normal  PSYCH: mentation appears normal, affect normal/bright    Diagnostic Test Results:  Labs reviewed in Epic    ASSESSMENT / PLAN:       ICD-10-CM    1. Encounter for Medicare annual wellness exam  Z00.00    2. Screening for prostate cancer  Z12.5 Prostate spec antigen screen   3. Lipid screening  Z13.220 Comprehensive metabolic panel     Lipid Profile   4. Benign prostatic hyperplasia without lower urinary tract symptoms  N40.0    5. History of changes of urine output  Z87.448 *UA reflex to Microscopic and Culture - [facility]    PSA pending.  I did do TOM given sx.  Remote hx of PSA elevation with prostatitis last several normal again.  BPH sx.  Will follow.  Some constipation sx as well.      Patient has been advised of split billing requirements and indicates understanding:     COUNSELING:  Reviewed preventive health counseling, as reflected in patient instructions    Estimated body mass index is 27.42 kg/m  as calculated from the following:    Height as of this encounter: 1.74 m (5' 8.5\").    Weight as of this encounter: 83 kg (183 lb).        He reports that he has quit smoking. His smoking use included cigarettes. He has never used smokeless tobacco.    Appropriate preventive services were discussed with this patient, including applicable screening as appropriate for cardiovascular disease, diabetes, osteopenia/osteoporosis, and glaucoma.  As appropriate for age/gender, discussed screening for colorectal cancer, prostate cancer, breast cancer, and cervical cancer. Checklist reviewing preventive services available has been given to the patient.    Reviewed patients plan of care and provided an AVS. The Basic Care Plan (routine screening as documented in Health Maintenance) for Samy meets the Care Plan requirement. This Care Plan has been established and reviewed with the Patient.    Counseling Resources:  ATP IV Guidelines  Pooled Cohorts Equation Calculator  Breast Cancer Risk " Calculator  BRCA-Related Cancer Risk Assessment: FHS-7 Tool  FRAX Risk Assessment  ICSI Preventive Guidelines  Dietary Guidelines for Americans, 2010  USDA's MyPlate  ASA Prophylaxis  Lung CA Screening    Reji Flynn MD  [facility]

## 2021-03-03 ENCOUNTER — OFFICE VISIT (OUTPATIENT)
Dept: FAMILY MEDICINE | Facility: OTHER | Age: 66
End: 2021-03-03
Attending: FAMILY MEDICINE
Payer: COMMERCIAL

## 2021-03-03 VITALS
BODY MASS INDEX: 27.11 KG/M2 | TEMPERATURE: 96.6 F | HEIGHT: 69 IN | SYSTOLIC BLOOD PRESSURE: 104 MMHG | HEART RATE: 64 BPM | DIASTOLIC BLOOD PRESSURE: 70 MMHG | WEIGHT: 183 LBS | OXYGEN SATURATION: 98 %

## 2021-03-03 DIAGNOSIS — Z12.5 SCREENING FOR PROSTATE CANCER: ICD-10-CM

## 2021-03-03 DIAGNOSIS — Z00.00 ENCOUNTER FOR MEDICARE ANNUAL WELLNESS EXAM: Primary | ICD-10-CM

## 2021-03-03 DIAGNOSIS — Z87.448 HISTORY OF CHANGES OF URINE OUTPUT: ICD-10-CM

## 2021-03-03 DIAGNOSIS — Z13.220 LIPID SCREENING: ICD-10-CM

## 2021-03-03 DIAGNOSIS — N40.0 BENIGN PROSTATIC HYPERPLASIA WITHOUT LOWER URINARY TRACT SYMPTOMS: ICD-10-CM

## 2021-03-03 LAB
ALBUMIN SERPL-MCNC: 4 G/DL (ref 3.4–5)
ALBUMIN UR-MCNC: NEGATIVE MG/DL
ALP SERPL-CCNC: 45 U/L (ref 40–150)
ALT SERPL W P-5'-P-CCNC: 29 U/L (ref 0–70)
ANION GAP SERPL CALCULATED.3IONS-SCNC: 4 MMOL/L (ref 3–14)
APPEARANCE UR: CLEAR
AST SERPL W P-5'-P-CCNC: 15 U/L (ref 0–45)
BILIRUB SERPL-MCNC: 0.6 MG/DL (ref 0.2–1.3)
BILIRUB UR QL STRIP: NEGATIVE
BUN SERPL-MCNC: 12 MG/DL (ref 7–30)
CALCIUM SERPL-MCNC: 8.9 MG/DL (ref 8.5–10.1)
CHLORIDE SERPL-SCNC: 103 MMOL/L (ref 94–109)
CHOLEST SERPL-MCNC: 147 MG/DL
CO2 SERPL-SCNC: 28 MMOL/L (ref 20–32)
COLOR UR AUTO: YELLOW
CREAT SERPL-MCNC: 0.96 MG/DL (ref 0.66–1.25)
GFR SERPL CREATININE-BSD FRML MDRD: 82 ML/MIN/{1.73_M2}
GLUCOSE SERPL-MCNC: 104 MG/DL (ref 70–99)
GLUCOSE UR STRIP-MCNC: NEGATIVE MG/DL
HDLC SERPL-MCNC: 52 MG/DL
HGB UR QL STRIP: NEGATIVE
KETONES UR STRIP-MCNC: NEGATIVE MG/DL
LDLC SERPL CALC-MCNC: 75 MG/DL
LEUKOCYTE ESTERASE UR QL STRIP: NEGATIVE
NITRATE UR QL: NEGATIVE
NONHDLC SERPL-MCNC: 95 MG/DL
PH UR STRIP: 7 PH (ref 5–7)
POTASSIUM SERPL-SCNC: 4.4 MMOL/L (ref 3.4–5.3)
PROT SERPL-MCNC: 7.5 G/DL (ref 6.8–8.8)
PSA SERPL-ACNC: 0.93 UG/L (ref 0–4)
SODIUM SERPL-SCNC: 135 MMOL/L (ref 133–144)
SOURCE: NORMAL
SP GR UR STRIP: 1.01 (ref 1–1.03)
TRIGL SERPL-MCNC: 101 MG/DL
UROBILINOGEN UR STRIP-ACNC: 0.2 EU/DL (ref 0.2–1)

## 2021-03-03 PROCEDURE — 81003 URINALYSIS AUTO W/O SCOPE: CPT | Mod: ZL | Performed by: FAMILY MEDICINE

## 2021-03-03 PROCEDURE — G0438 PPPS, INITIAL VISIT: HCPCS | Performed by: FAMILY MEDICINE

## 2021-03-03 PROCEDURE — G0463 HOSPITAL OUTPT CLINIC VISIT: HCPCS

## 2021-03-03 PROCEDURE — G0103 PSA SCREENING: HCPCS | Mod: ZL | Performed by: FAMILY MEDICINE

## 2021-03-03 PROCEDURE — 36415 COLL VENOUS BLD VENIPUNCTURE: CPT | Mod: ZL | Performed by: FAMILY MEDICINE

## 2021-03-03 PROCEDURE — 80053 COMPREHEN METABOLIC PANEL: CPT | Mod: ZL | Performed by: FAMILY MEDICINE

## 2021-03-03 PROCEDURE — 80061 LIPID PANEL: CPT | Mod: ZL | Performed by: FAMILY MEDICINE

## 2021-03-03 ASSESSMENT — PATIENT HEALTH QUESTIONNAIRE - PHQ9
5. POOR APPETITE OR OVEREATING: NOT AT ALL
SUM OF ALL RESPONSES TO PHQ QUESTIONS 1-9: 0

## 2021-03-03 ASSESSMENT — ANXIETY QUESTIONNAIRES
IF YOU CHECKED OFF ANY PROBLEMS ON THIS QUESTIONNAIRE, HOW DIFFICULT HAVE THESE PROBLEMS MADE IT FOR YOU TO DO YOUR WORK, TAKE CARE OF THINGS AT HOME, OR GET ALONG WITH OTHER PEOPLE: NOT DIFFICULT AT ALL
7. FEELING AFRAID AS IF SOMETHING AWFUL MIGHT HAPPEN: NOT AT ALL
6. BECOMING EASILY ANNOYED OR IRRITABLE: NOT AT ALL
5. BEING SO RESTLESS THAT IT IS HARD TO SIT STILL: NOT AT ALL
GAD7 TOTAL SCORE: 0
3. WORRYING TOO MUCH ABOUT DIFFERENT THINGS: NOT AT ALL
2. NOT BEING ABLE TO STOP OR CONTROL WORRYING: NOT AT ALL
1. FEELING NERVOUS, ANXIOUS, OR ON EDGE: NOT AT ALL

## 2021-03-03 ASSESSMENT — MIFFLIN-ST. JEOR: SCORE: 1597.52

## 2021-03-03 ASSESSMENT — PAIN SCALES - GENERAL: PAINLEVEL: NO PAIN (0)

## 2021-03-03 NOTE — NURSING NOTE
"Chief Complaint   Patient presents with     Physical       Initial /70   Pulse 64   Temp 96.6  F (35.9  C)   Ht 1.74 m (5' 8.5\")   Wt 83 kg (183 lb)   SpO2 98%   BMI 27.42 kg/m   Estimated body mass index is 27.42 kg/m  as calculated from the following:    Height as of this encounter: 1.74 m (5' 8.5\").    Weight as of this encounter: 83 kg (183 lb).  Medication Reconciliation: complete  Aisha Tucker LPN  "

## 2021-03-04 ASSESSMENT — ANXIETY QUESTIONNAIRES: GAD7 TOTAL SCORE: 0

## 2021-04-18 ENCOUNTER — HEALTH MAINTENANCE LETTER (OUTPATIENT)
Age: 66
End: 2021-04-18

## 2021-10-03 ENCOUNTER — HEALTH MAINTENANCE LETTER (OUTPATIENT)
Age: 66
End: 2021-10-03

## 2022-05-14 ENCOUNTER — HEALTH MAINTENANCE LETTER (OUTPATIENT)
Age: 67
End: 2022-05-14

## 2022-07-25 ASSESSMENT — ENCOUNTER SYMPTOMS
ARTHRALGIAS: 0
NAUSEA: 0
CHILLS: 0
SORE THROAT: 0
ABDOMINAL PAIN: 0
HEMATOCHEZIA: 0
DIZZINESS: 0
EYE PAIN: 0
MYALGIAS: 0
COUGH: 0
WEAKNESS: 0
FREQUENCY: 1
SHORTNESS OF BREATH: 0
CONSTIPATION: 0
HEADACHES: 0
PARESTHESIAS: 0
HEMATURIA: 0
JOINT SWELLING: 0
DYSURIA: 0
NERVOUS/ANXIOUS: 0
PALPITATIONS: 0
FEVER: 0
HEARTBURN: 0
DIARRHEA: 0

## 2022-07-25 ASSESSMENT — ACTIVITIES OF DAILY LIVING (ADL): CURRENT_FUNCTION: NO ASSISTANCE NEEDED

## 2022-07-27 ASSESSMENT — ACTIVITIES OF DAILY LIVING (ADL): CURRENT_FUNCTION: NO ASSISTANCE NEEDED

## 2022-07-27 NOTE — PATIENT INSTRUCTIONS
Patient Education   Personalized Prevention Plan  You are due for the preventive services outlined below.  Your care team is available to assist you in scheduling these services.  If you have already completed any of these items, please share that information with your care team to update in your medical record.  Health Maintenance Due   Topic Date Due     Hepatitis C Screening  Never done     Diptheria Tetanus Pertussis (DTAP/TDAP/TD) Vaccine (1 - Tdap) Never done     Zoster (Shingles) Vaccine (1 of 2) Never done     LUNG CANCER SCREENING  Never done     Pneumococcal Vaccine (1 - PCV) Never done     Annual Wellness Visit  03/03/2022     Cholesterol Lab  03/03/2022     Anxiety Assessment  03/03/2022     FALL RISK ASSESSMENT  03/03/2022     Depression Assessment  03/03/2022     COVID-19 Vaccine (4 - Booster for Moderna series) 04/06/2022

## 2022-07-27 NOTE — PROGRESS NOTES
"SUBJECTIVE:   Samy Lu is a 67 year old male who presents for Preventive Visit.        Are you in the first 12 months of your Medicare coverage?  No    Healthy Habits:     In general, how would you rate your overall health?  Good    Frequency of exercise:  None    Do you usually eat at least 4 servings of fruit and vegetables a day, include whole grains    & fiber and avoid regularly eating high fat or \"junk\" foods?  Yes    Taking medications regularly:  Yes    Medication side effects:  None    Ability to successfully perform activities of daily living:  No assistance needed    Home Safety:  No safety concerns identified    Hearing Impairment:  No hearing concerns    In the past 6 months, have you been bothered by leaking of urine?  No    In general, how would you rate your overall mental or emotional health?  Good      PHQ-2 Total Score: 0    Additional concerns today:  No    Do you feel safe in your environment? Yes    Have you ever done Advance Care Planning? (For example, a Health Directive, POLST, or a discussion with a medical provider or your loved ones about your wishes): No, advance care planning information given to patient to review.  Patient plans to discuss their wishes with loved ones or provider.         Fall risk  Fallen 2 or more times in the past year?: No  Any fall with injury in the past year?: No    Cognitive Screening   1) Repeat 3 items (Leader, Season, Table)    2) Clock draw: NORMAL  3) 3 item recall: Recalls 3 objects  Results: 3 items recalled: COGNITIVE IMPAIRMENT LESS LIKELY    Mini-CogTM Copyright S Helga. Licensed by the author for use in [facility]; reprinted with permission ([email or web address]@.[email or web address]). All rights reserved.      Do you have sleep apnea, excessive snoring or daytime drowsiness?: no    Reviewed and updated as needed this visit by clinical staff   Tobacco  Allergies  Meds                Reviewed and updated as needed this visit by Provider                 "   Social History     Tobacco Use     Smoking status: Current Every Day Smoker     Packs/day: 0.25     Years: 53.00     Pack years: 13.25     Types: Cigarettes     Start date: 1969     Last attempt to quit: 2006     Years since quittin.5     Smokeless tobacco: Never Used   Substance Use Topics     Alcohol use: Yes     Comment: rare         Alcohol Use 2022   Prescreen: >3 drinks/day or >7 drinks/week? No               Current providers sharing in care for this patient include:   Patient Care Team:  Reji Flynn MD as PCP - General (Family Practice)  Reji Flynn MD as Assigned PCP    The following health maintenance items are reviewed in Epic and correct as of today:  Health Maintenance Due   Topic Date Due     NICOTINE/TOBACCO CESSATION COUNSELING Q 1 YR  Never done     HEPATITIS C SCREENING  Never done     DTAP/TDAP/TD IMMUNIZATION (1 - Tdap) Never done     ZOSTER IMMUNIZATION (1 of 2) Never done     LUNG CANCER SCREENING  Never done     Pneumococcal Vaccine: 65+ Years (1 - PCV) Never done     LIPID  2022     COVID-19 Vaccine (4 - Booster for Moderna series) 2022     Lab work is in process          Review of Systems  CONSTITUTIONAL: NEGATIVE for fever, chills, change in weight  INTEGUMENTARY/SKIN: NEGATIVE for worrisome rashes, moles or lesions  EYES: NEGATIVE for vision changes or irritation  ENT/MOUTH: NEGATIVE for ear, mouth and throat problems  RESP: NEGATIVE for significant cough or SOB  BREAST: NEGATIVE for masses, tenderness or discharge  CV: NEGATIVE for chest pain, palpitations or peripheral edema  GI: NEGATIVE for nausea, abdominal pain, heartburn, or change in bowel habits  : NEGATIVE for frequency, dysuria, or hematuria  MUSCULOSKELETAL: NEGATIVE for significant arthralgias or myalgia  NEURO: NEGATIVE for weakness, dizziness or paresthesias  ENDOCRINE: NEGATIVE for temperature intolerance, skin/hair changes  HEME: NEGATIVE for bleeding problems  PSYCHIATRIC:  "NEGATIVE for changes in mood or affect    OBJECTIVE:   /56   Pulse 67   Temp 97.3  F (36.3  C)   Ht 1.727 m (5' 8\")   Wt 79.4 kg (175 lb)   SpO2 97%   BMI 26.61 kg/m   Estimated body mass index is 26.61 kg/m  as calculated from the following:    Height as of this encounter: 1.727 m (5' 8\").    Weight as of this encounter: 79.4 kg (175 lb).  Physical Exam  GENERAL: healthy, alert and no distress  EYES: Eyes grossly normal to inspection, PERRL and conjunctivae and sclerae normal  HENT: ear canals and TM's normal, nose and mouth without ulcers or lesions  NECK: no adenopathy, no asymmetry, masses, or scars and thyroid normal to palpation  RESP: lungs clear to auscultation - no rales, rhonchi or wheezes  CV: regular rate and rhythm, normal S1 S2, no S3 or S4, no murmur, click or rub, no peripheral edema and peripheral pulses strong  ABDOMEN: soft, nontender, no hepatosplenomegaly, no masses and bowel sounds normal   (male): normal male genitalia without lesions or urethral discharge, small reducible LIH recurrence superiorly  RECTAL: normal sphincter tone, no rectal masses, prostate normal size, smooth, nontender without nodules or masses  RECTAL: multiple external tags.    MS: no gross musculoskeletal defects noted, no edema  SKIN: no suspicious lesions or rashes  NEURO: Normal strength and tone, mentation intact and speech normal  PSYCH: mentation appears normal, affect normal/bright    Diagnostic Test Results:  Labs reviewed in Epic    ASSESSMENT / PLAN:       ICD-10-CM    1. Encounter for Medicare annual wellness exam  Z00.00    2. Screening for prostate cancer  Z12.5 Prostate Specific Antigen Screen     Prostate Specific Antigen Screen   3. Lipid screening  Z13.220 Comprehensive metabolic panel     Lipid Profile     Comprehensive metabolic panel     Lipid Profile   4. Need for hepatitis C screening test  Z11.59 Hepatitis C Screen Reflex to HCV RNA Quant and Genotype     Hepatitis C Screen Reflex to " "HCV RNA Quant and Genotype   5. Left inguinal hernia  K40.90 Adult General Surg Referral   6. External hemorrhoids  K64.4 Adult General Surg Referral           COUNSELING:  Reviewed preventive health counseling, as reflected in patient instructions    Estimated body mass index is 26.61 kg/m  as calculated from the following:    Height as of this encounter: 1.727 m (5' 8\").    Weight as of this encounter: 79.4 kg (175 lb).        He reports that he has been smoking cigarettes. He started smoking about 53 years ago. He has a 13.25 pack-year smoking history. He has never used smokeless tobacco.      Appropriate preventive services were discussed with this patient, including applicable screening as appropriate for cardiovascular disease, diabetes, osteopenia/osteoporosis, and glaucoma.  As appropriate for age/gender, discussed screening for colorectal cancer, prostate cancer, breast cancer, and cervical cancer. Checklist reviewing preventive services available has been given to the patient.    Reviewed patients plan of care and provided an AVS. The Basic Care Plan (routine screening as documented in Health Maintenance) for Samy meets the Care Plan requirement. This Care Plan has been established and reviewed with the Patient    Counseling Resources:  ATP IV Guidelines  Pooled Cohorts Equation Calculator  Breast Cancer Risk Calculator  Breast Cancer: Medication to Reduce Risk  FRAX Risk Assessment  ICSI Preventive Guidelines  Dietary Guidelines for Americans, 2010  [employer]'s MyPlate  ASA Prophylaxis  Lung CA Screening    Reji Flynn MD  [facility]    Identified Health Risks:  "

## 2022-08-01 ENCOUNTER — OFFICE VISIT (OUTPATIENT)
Dept: FAMILY MEDICINE | Facility: OTHER | Age: 67
End: 2022-08-01
Attending: FAMILY MEDICINE
Payer: COMMERCIAL

## 2022-08-01 VITALS
OXYGEN SATURATION: 97 % | BODY MASS INDEX: 26.52 KG/M2 | HEART RATE: 67 BPM | DIASTOLIC BLOOD PRESSURE: 56 MMHG | TEMPERATURE: 97.3 F | HEIGHT: 68 IN | WEIGHT: 175 LBS | SYSTOLIC BLOOD PRESSURE: 100 MMHG

## 2022-08-01 DIAGNOSIS — Z11.59 NEED FOR HEPATITIS C SCREENING TEST: ICD-10-CM

## 2022-08-01 DIAGNOSIS — K40.90 LEFT INGUINAL HERNIA: ICD-10-CM

## 2022-08-01 DIAGNOSIS — Z12.5 SCREENING FOR PROSTATE CANCER: ICD-10-CM

## 2022-08-01 DIAGNOSIS — K64.4 EXTERNAL HEMORRHOIDS: ICD-10-CM

## 2022-08-01 DIAGNOSIS — Z00.00 ENCOUNTER FOR MEDICARE ANNUAL WELLNESS EXAM: Primary | ICD-10-CM

## 2022-08-01 DIAGNOSIS — Z13.220 LIPID SCREENING: ICD-10-CM

## 2022-08-01 LAB
ALBUMIN SERPL-MCNC: 3.6 G/DL (ref 3.4–5)
ALP SERPL-CCNC: 47 U/L (ref 40–150)
ALT SERPL W P-5'-P-CCNC: 21 U/L (ref 0–70)
ANION GAP SERPL CALCULATED.3IONS-SCNC: 2 MMOL/L (ref 3–14)
AST SERPL W P-5'-P-CCNC: 15 U/L (ref 0–45)
BILIRUB SERPL-MCNC: 0.7 MG/DL (ref 0.2–1.3)
BUN SERPL-MCNC: 14 MG/DL (ref 7–30)
CALCIUM SERPL-MCNC: 8.6 MG/DL (ref 8.5–10.1)
CHLORIDE BLD-SCNC: 103 MMOL/L (ref 94–109)
CHOLEST SERPL-MCNC: 128 MG/DL
CO2 SERPL-SCNC: 32 MMOL/L (ref 20–32)
CREAT SERPL-MCNC: 1.05 MG/DL (ref 0.66–1.25)
FASTING STATUS PATIENT QL REPORTED: NO
GFR SERPL CREATININE-BSD FRML MDRD: 78 ML/MIN/1.73M2
GLUCOSE BLD-MCNC: 81 MG/DL (ref 70–99)
HDLC SERPL-MCNC: 48 MG/DL
LDLC SERPL CALC-MCNC: 54 MG/DL
NONHDLC SERPL-MCNC: 80 MG/DL
POTASSIUM BLD-SCNC: 3.7 MMOL/L (ref 3.4–5.3)
PROT SERPL-MCNC: 7 G/DL (ref 6.8–8.8)
PSA SERPL-MCNC: 1.33 UG/L (ref 0–4)
SODIUM SERPL-SCNC: 137 MMOL/L (ref 133–144)
TRIGL SERPL-MCNC: 130 MG/DL

## 2022-08-01 PROCEDURE — 80053 COMPREHEN METABOLIC PANEL: CPT | Mod: ZL | Performed by: FAMILY MEDICINE

## 2022-08-01 PROCEDURE — G0463 HOSPITAL OUTPT CLINIC VISIT: HCPCS

## 2022-08-01 PROCEDURE — 84460 ALANINE AMINO (ALT) (SGPT): CPT | Mod: ZL | Performed by: FAMILY MEDICINE

## 2022-08-01 PROCEDURE — 86803 HEPATITIS C AB TEST: CPT | Mod: ZL | Performed by: FAMILY MEDICINE

## 2022-08-01 PROCEDURE — 80061 LIPID PANEL: CPT | Mod: ZL,GZ | Performed by: FAMILY MEDICINE

## 2022-08-01 PROCEDURE — 36415 COLL VENOUS BLD VENIPUNCTURE: CPT | Mod: ZL | Performed by: FAMILY MEDICINE

## 2022-08-01 PROCEDURE — G0439 PPPS, SUBSEQ VISIT: HCPCS | Performed by: FAMILY MEDICINE

## 2022-08-01 PROCEDURE — G0103 PSA SCREENING: HCPCS | Mod: ZL | Performed by: FAMILY MEDICINE

## 2022-08-01 ASSESSMENT — PAIN SCALES - GENERAL: PAINLEVEL: MODERATE PAIN (4)

## 2022-08-01 NOTE — NURSING NOTE
"Chief Complaint   Patient presents with     Physical       Initial /56   Pulse 67   Temp 97.3  F (36.3  C)   Ht 1.727 m (5' 8\")   Wt 79.4 kg (175 lb)   SpO2 97%   BMI 26.61 kg/m   Estimated body mass index is 26.61 kg/m  as calculated from the following:    Height as of this encounter: 1.727 m (5' 8\").    Weight as of this encounter: 79.4 kg (175 lb).  Medication Reconciliation: complete  Aisha Tucker LPN  "

## 2022-08-02 LAB — HCV AB SERPL QL IA: NONREACTIVE

## 2022-08-10 ENCOUNTER — OFFICE VISIT (OUTPATIENT)
Dept: SURGERY | Facility: OTHER | Age: 67
End: 2022-08-10
Attending: FAMILY MEDICINE
Payer: COMMERCIAL

## 2022-08-10 VITALS
TEMPERATURE: 97.8 F | BODY MASS INDEX: 25.76 KG/M2 | OXYGEN SATURATION: 99 % | HEIGHT: 68 IN | HEART RATE: 73 BPM | SYSTOLIC BLOOD PRESSURE: 110 MMHG | DIASTOLIC BLOOD PRESSURE: 70 MMHG | WEIGHT: 170 LBS

## 2022-08-10 DIAGNOSIS — K64.4 EXTERNAL HEMORRHOIDS: ICD-10-CM

## 2022-08-10 DIAGNOSIS — K40.90 LEFT INGUINAL HERNIA: ICD-10-CM

## 2022-08-10 PROCEDURE — 99204 OFFICE O/P NEW MOD 45 MIN: CPT | Performed by: SURGERY

## 2022-08-10 PROCEDURE — G0463 HOSPITAL OUTPT CLINIC VISIT: HCPCS

## 2022-08-10 ASSESSMENT — PAIN SCALES - GENERAL: PAINLEVEL: NO PAIN (0)

## 2022-08-10 NOTE — NURSING NOTE
"Chief Complaint   Patient presents with     Consult     Left inguinal hernia, external hemorrhoids       Initial /70 (BP Location: Left arm, Patient Position: Sitting, Cuff Size: Adult Large)   Pulse 73   Temp 97.8  F (36.6  C) (Tympanic)   Ht 1.727 m (5' 8\")   Wt 77.1 kg (170 lb)   SpO2 99%   BMI 25.85 kg/m   Estimated body mass index is 25.85 kg/m  as calculated from the following:    Height as of this encounter: 1.727 m (5' 8\").    Weight as of this encounter: 77.1 kg (170 lb).  Medication Reconciliation: complete  Carrie Louis LPN    "

## 2022-08-10 NOTE — PATIENT INSTRUCTIONS
Thank you for allowing Dr. Zacarias and our surgical team to participate in your care. Please call our health unit coordinator at [phone] with scheduling questions or the nurse at [phone] with any other questions or concerns.

## 2022-08-10 NOTE — PROGRESS NOTES
"[facility] Surgery Consultation    CC:  Perianal irritation, left groin bulge     HPI:  This 67 year old year old male is seen at the request of Dr. Flynn  for evaluation of left groin bulge and perianal irritation. He was recently seen by PCP where he noted to have small left groin bulge on exam. He denies symptoms. He reports prior open hernia repair many years ago. He believes there is mesh in place. He reports some difficulty with bowel movements he reports looser stools. He will have bleeding when he strains. He will have to push tissue back in on occasion. He had colonoscopy in 2018 one adenoma removed. He has had surgery for an anal fissure in the past and ended up in hospital for a week. He had significant pain with recovery for this. He does continue to smoke though is trying to quit.      No past medical history on file.    Past Surgical History:   Procedure Laterality Date     ABDOMEN SURGERY      right IHR     ABDOMEN SURGERY      2 hernia repairs     ABDOMEN SURGERY      Umbilical hernia repair     COLONOSCOPY  10/21/2009    Due      COLONOSCOPY N/A 2018    Procedure: COLONOSCOPY With POLYPECTOMY,RESOLUTION CLIP, BIOPSY;  Surgeon: Jesus Shi MD;  Location: [facility] OR       No Known Allergies    Current Outpatient Medications   Medication     Multiple Vitamins-Minerals (ZINC PO)     No current facility-administered medications for this visit.       HABITS:    Social History     Tobacco Use     Smoking status: Current Every Day Smoker     Packs/day: 0.25     Years: 53.00     Pack years: 13.25     Types: Cigarettes     Start date: 1969     Last attempt to quit: 2006     Years since quittin.6     Smokeless tobacco: Never Used   Substance Use Topics     Alcohol use: Yes     Comment: rare     Drug use: Yes     Types: Marijuana     Comment: \"for glaucoma\"       Family History   Problem Relation Age of Onset     Cancer Father         Non Hodgkins Lymphoma     Diabetes Paternal " "Grandmother        REVIEW OF SYSTEMS:  Ten point review of systems negative except those mentioned in the HPI.     OBJECTIVE:    /70 (BP Location: Left arm, Patient Position: Sitting, Cuff Size: Adult Large)   Pulse 73   Temp 97.8  F (36.6  C) (Tympanic)   Ht 1.727 m (5' 8\")   Wt 77.1 kg (170 lb)   SpO2 99%   BMI 25.85 kg/m      GENERAL: Generally appears well, in no distress with appropriate affect.  HEENT:   Sclerae anicteric - normocephalic atraumatic   Respiratory:  No acute distress, no splinting   Cardiovascular:  Regular Rate and Rhythm  Abdomen: left groin bulge with valsalva, reducible. Perianal skin tags with internal hemorrhoids visible at anal verge.   :  deferred  Extremities:  Extremities normal. No deformities, edema, or skin discoloration.  Skin:  no suspicious lesions or rashes  Neurological: grossly intact  Psych:  Alert, oriented, affect appropriate with normal decision making ability.    IMPRESSION:    Likely recurrent left inguinal hernia, vs laxity in abdominal wall, discussed with prior mesh placement would prefer he were symptomatic prior to fixing, will evaluate further with non-contrast CT.     He does have symptomatic hemorrhoids, but I did discuss that his pain post-op would be similar to his fissure surgery which made him re-consider. I did discuss that doing fiber supplementation would help and he would like to try this first. I did discuss smoking cessation will help with both problems. Colonoscopy in 2023.     PLAN:    Ct abdomen pelvis w/o contrast   Potential for laparoscopic left inguinal hernia if becomes symptomatic   Fiber supplementation   Colonoscopy in 2023    Leo Zacarias MD,     8/10/2022  9:34 AM      "

## 2022-08-15 ENCOUNTER — HOSPITAL ENCOUNTER (OUTPATIENT)
Dept: CT IMAGING | Facility: HOSPITAL | Age: 67
Discharge: HOME | End: 2022-08-15
Attending: SURGERY | Admitting: SURGERY
Payer: MEDICARE

## 2022-08-15 DIAGNOSIS — K40.90 LEFT INGUINAL HERNIA: ICD-10-CM

## 2022-08-15 PROCEDURE — G1010 CDSM STANSON: HCPCS

## 2022-09-04 ENCOUNTER — HEALTH MAINTENANCE LETTER (OUTPATIENT)
Age: 67
End: 2022-09-04

## 2023-04-11 ENCOUNTER — OFFICE VISIT (OUTPATIENT)
Dept: FAMILY MEDICINE | Facility: OTHER | Age: 68
End: 2023-04-11
Attending: FAMILY MEDICINE
Payer: COMMERCIAL

## 2023-04-11 ENCOUNTER — TELEPHONE (OUTPATIENT)
Dept: FAMILY MEDICINE | Facility: OTHER | Age: 68
End: 2023-04-11

## 2023-04-11 VITALS
DIASTOLIC BLOOD PRESSURE: 70 MMHG | BODY MASS INDEX: 27.52 KG/M2 | TEMPERATURE: 98.5 F | WEIGHT: 181 LBS | HEART RATE: 76 BPM | SYSTOLIC BLOOD PRESSURE: 112 MMHG | OXYGEN SATURATION: 96 %

## 2023-04-11 DIAGNOSIS — K57.32 DIVERTICULITIS OF COLON: ICD-10-CM

## 2023-04-11 DIAGNOSIS — R10.32 LLQ ABDOMINAL PAIN: Primary | ICD-10-CM

## 2023-04-11 DIAGNOSIS — R19.7 DIARRHEA, UNSPECIFIED TYPE: ICD-10-CM

## 2023-04-11 LAB
ALBUMIN SERPL BCG-MCNC: 4 G/DL (ref 3.5–5.2)
ALP SERPL-CCNC: 42 U/L (ref 40–129)
ALT SERPL W P-5'-P-CCNC: 18 U/L (ref 10–50)
ANION GAP SERPL CALCULATED.3IONS-SCNC: 13 MMOL/L (ref 7–15)
AST SERPL W P-5'-P-CCNC: 16 U/L (ref 10–50)
BASOPHILS # BLD AUTO: 0 10E3/UL (ref 0–0.2)
BASOPHILS NFR BLD AUTO: 0 %
BILIRUB SERPL-MCNC: 0.3 MG/DL
BUN SERPL-MCNC: 13.7 MG/DL (ref 8–23)
CALCIUM SERPL-MCNC: 9 MG/DL (ref 8.8–10.2)
CHLORIDE SERPL-SCNC: 101 MMOL/L (ref 98–107)
CREAT SERPL-MCNC: 1.03 MG/DL (ref 0.67–1.17)
DEPRECATED HCO3 PLAS-SCNC: 25 MMOL/L (ref 22–29)
EOSINOPHIL # BLD AUTO: 0.1 10E3/UL (ref 0–0.7)
EOSINOPHIL NFR BLD AUTO: 1 %
ERYTHROCYTE [DISTWIDTH] IN BLOOD BY AUTOMATED COUNT: 12.9 % (ref 10–15)
GFR SERPL CREATININE-BSD FRML MDRD: 80 ML/MIN/1.73M2
GLUCOSE SERPL-MCNC: 97 MG/DL (ref 70–99)
HCT VFR BLD AUTO: 46.2 % (ref 40–53)
HGB BLD-MCNC: 16.9 G/DL (ref 13.3–17.7)
LYMPHOCYTES # BLD AUTO: 2.1 10E3/UL (ref 0.8–5.3)
LYMPHOCYTES NFR BLD AUTO: 14 %
MCH RBC QN AUTO: 33.3 PG (ref 26.5–33)
MCHC RBC AUTO-ENTMCNC: 36.6 G/DL (ref 31.5–36.5)
MCV RBC AUTO: 91 FL (ref 78–100)
MONOCYTES # BLD AUTO: 1.3 10E3/UL (ref 0–1.3)
MONOCYTES NFR BLD AUTO: 8 %
NEUTROPHILS # BLD AUTO: 11.9 10E3/UL (ref 1.6–8.3)
NEUTROPHILS NFR BLD AUTO: 77 %
PLATELET # BLD AUTO: 239 10E3/UL (ref 150–450)
POTASSIUM SERPL-SCNC: 3.9 MMOL/L (ref 3.4–5.3)
PROT SERPL-MCNC: 7 G/DL (ref 6.4–8.3)
RBC # BLD AUTO: 5.08 10E6/UL (ref 4.4–5.9)
SODIUM SERPL-SCNC: 139 MMOL/L (ref 136–145)
WBC # BLD AUTO: 15.4 10E3/UL (ref 4–11)

## 2023-04-11 PROCEDURE — 80053 COMPREHEN METABOLIC PANEL: CPT | Mod: ZL | Performed by: FAMILY MEDICINE

## 2023-04-11 PROCEDURE — 85025 COMPLETE CBC W/AUTO DIFF WBC: CPT | Mod: ZL | Performed by: FAMILY MEDICINE

## 2023-04-11 PROCEDURE — 36415 COLL VENOUS BLD VENIPUNCTURE: CPT | Mod: ZL | Performed by: FAMILY MEDICINE

## 2023-04-11 PROCEDURE — G0463 HOSPITAL OUTPT CLINIC VISIT: HCPCS

## 2023-04-11 PROCEDURE — 99214 OFFICE O/P EST MOD 30 MIN: CPT | Performed by: FAMILY MEDICINE

## 2023-04-11 RX ORDER — TIMOLOL MALEATE 5 MG/ML
SOLUTION/ DROPS OPHTHALMIC DAILY
COMMUNITY
Start: 2023-02-22 | End: 2024-02-07

## 2023-04-11 ASSESSMENT — PAIN SCALES - GENERAL: PAINLEVEL: MODERATE PAIN (5)

## 2023-04-11 NOTE — PROGRESS NOTES
"  Assessment & Plan     LLQ abdominal pain  Reviewed.  Known diverticula.  No fever.  2 weeks of sx not improving.  Tender LLQ without peritoneal signs.  Getting CT scan.  Plan augmentin.  Blood work as below.    - CBC with platelets and differential; Future  - Comprehensive metabolic panel (BMP + Alb, Alk Phos, ALT, AST, Total. Bili, TP); Future  - amoxicillin-clavulanate (AUGMENTIN) 875-125 MG tablet; Take 1 tablet by mouth 2 times daily for 10 days  - CT Abdomen Pelvis w Contrast; Future  - CBC with platelets and differential  - Comprehensive metabolic panel (BMP + Alb, Alk Phos, ALT, AST, Total. Bili, TP)    Diarrhea, unspecified type  Intermittent.  Again, update CT looking for inflammatory and follow.  Colon screen done in 2018.      Diverticulitis of colon  As above.  Plan augmentin pending the workup.    - amoxicillin-clavulanate (AUGMENTIN) 875-125 MG tablet; Take 1 tablet by mouth 2 times daily for 10 days             Nicotine/Tobacco Cessation:  He reports that he has been smoking cigarettes. He started smoking about 54 years ago. He has a 13.25 pack-year smoking history. He has never used smokeless tobacco.  Nicotine/Tobacco Cessation Plan:   Information offered: Patient not interested at this time      BMI:   Estimated body mass index is 27.52 kg/m  as calculated from the following:    Height as of 8/10/22: 1.727 m (5' 8\").    Weight as of this encounter: 82.1 kg (181 lb).           No follow-ups on file.    Reji Flynn MD  [facility]    Suman Pablo is a 67 year old, presenting for the following health issues:  Abdominal Pain         View : No data to display.              HPI     Abdominal Pain      Duration: 2 weeks     Description (location/character/radiation): LLQ       Associated flank pain: yes    Intensity:  moderate    Accompanying signs and symptoms:        Fever/Chills: no        Gas/Bloating: YES- gas       Nausea/vomitting: no        Diarrhea: no      "   Dysuria or Hematuria: no     History (previous similar pain/trauma/previous testing): none    Precipitating or alleviating factors:       Pain worse with eating/BM/urination: eating nuts        Pain relieved by BM: no     Therapies tried and outcome: heat     LMP:  not applicable             Review of Systems   Constitutional, HEENT, cardiovascular, pulmonary, gi and gu systems are negative, except as otherwise noted.      Objective    /70   Pulse 76   Temp 98.5  F (36.9  C) (Tympanic)   Wt 82.1 kg (181 lb)   SpO2 96%   BMI 27.52 kg/m    Body mass index is 27.52 kg/m .  Physical Exam   GENERAL: healthy, alert and no distress  NECK: no adenopathy, no asymmetry, masses, or scars and thyroid normal to palpation  RESP: lungs clear to auscultation - no rales, rhonchi or wheezes  CV: regular rate and rhythm, normal S1 S2, no S3 or S4, no murmur, click or rub, no peripheral edema and peripheral pulses strong  ABDOMEN: LLQ tender without peritoneal signs.  Otherwise soft and stable without abnormality.     MS: no gross musculoskeletal defects noted, no edema    Labs and CT scan pending as above.

## 2023-04-11 NOTE — TELEPHONE ENCOUNTER
8:09 AM    Reason for Call: OVERBOOK    Patient is having the following symptoms: Lower abdominal pain left side  for 2 weeks    The patient is requesting an appointment for ASAP with  Dr. Flynn    Was an appointment offered for this call? No  If yes : Appointment type              Date    Preferred method for responding to this message: Telephone Call  What is your phone number ?  [phone]    If we cannot reach you directly, may we leave a detailed response at the number you provided? Yes    Can this message wait until your PCP/provider returns, if unavailable today? Not applicable,    Corazon Guzman

## 2023-04-14 ENCOUNTER — APPOINTMENT (OUTPATIENT)
Dept: CT IMAGING | Facility: HOSPITAL | Age: 68
End: 2023-04-14
Attending: EMERGENCY MEDICINE
Payer: MEDICARE

## 2023-04-14 ENCOUNTER — HOSPITAL ENCOUNTER (EMERGENCY)
Facility: HOSPITAL | Age: 68
Discharge: HOME | End: 2023-04-14
Attending: EMERGENCY MEDICINE | Admitting: EMERGENCY MEDICINE
Payer: MEDICARE

## 2023-04-14 VITALS
OXYGEN SATURATION: 98 % | RESPIRATION RATE: 18 BRPM | SYSTOLIC BLOOD PRESSURE: 151 MMHG | DIASTOLIC BLOOD PRESSURE: 93 MMHG | HEART RATE: 73 BPM | TEMPERATURE: 96.8 F

## 2023-04-14 DIAGNOSIS — R10.32 ABDOMINAL PAIN, LEFT LOWER QUADRANT: ICD-10-CM

## 2023-04-14 DIAGNOSIS — K57.32 DIVERTICULITIS OF COLON: ICD-10-CM

## 2023-04-14 LAB
ALBUMIN SERPL BCG-MCNC: 4.3 G/DL (ref 3.5–5.2)
ALP SERPL-CCNC: 60 U/L (ref 40–129)
ALT SERPL W P-5'-P-CCNC: 45 U/L (ref 10–50)
ANION GAP SERPL CALCULATED.3IONS-SCNC: 9 MMOL/L (ref 7–15)
AST SERPL W P-5'-P-CCNC: 37 U/L (ref 10–50)
BASOPHILS # BLD AUTO: 0.1 10E3/UL (ref 0–0.2)
BASOPHILS NFR BLD AUTO: 1 %
BILIRUB SERPL-MCNC: 0.5 MG/DL
BUN SERPL-MCNC: 10.1 MG/DL (ref 8–23)
CALCIUM SERPL-MCNC: 9.2 MG/DL (ref 8.8–10.2)
CHLORIDE SERPL-SCNC: 99 MMOL/L (ref 98–107)
CREAT SERPL-MCNC: 0.97 MG/DL (ref 0.67–1.17)
DEPRECATED HCO3 PLAS-SCNC: 29 MMOL/L (ref 22–29)
EOSINOPHIL # BLD AUTO: 0.1 10E3/UL (ref 0–0.7)
EOSINOPHIL NFR BLD AUTO: 1 %
ERYTHROCYTE [DISTWIDTH] IN BLOOD BY AUTOMATED COUNT: 12.4 % (ref 10–15)
GFR SERPL CREATININE-BSD FRML MDRD: 86 ML/MIN/1.73M2
GLUCOSE SERPL-MCNC: 102 MG/DL (ref 70–99)
HCT VFR BLD AUTO: 49.2 % (ref 40–53)
HGB BLD-MCNC: 17.6 G/DL (ref 13.3–17.7)
IMM GRANULOCYTES # BLD: 0 10E3/UL
IMM GRANULOCYTES NFR BLD: 0 %
INR PPP: 1.03 (ref 0.85–1.15)
LIPASE SERPL-CCNC: 41 U/L (ref 13–60)
LYMPHOCYTES # BLD AUTO: 1.5 10E3/UL (ref 0.8–5.3)
LYMPHOCYTES NFR BLD AUTO: 16 %
MCH RBC QN AUTO: 33.3 PG (ref 26.5–33)
MCHC RBC AUTO-ENTMCNC: 35.8 G/DL (ref 31.5–36.5)
MCV RBC AUTO: 93 FL (ref 78–100)
MONOCYTES # BLD AUTO: 0.8 10E3/UL (ref 0–1.3)
MONOCYTES NFR BLD AUTO: 9 %
NEUTROPHILS # BLD AUTO: 7.1 10E3/UL (ref 1.6–8.3)
NEUTROPHILS NFR BLD AUTO: 73 %
NRBC # BLD AUTO: 0 10E3/UL
NRBC BLD AUTO-RTO: 0 /100
PLATELET # BLD AUTO: 224 10E3/UL (ref 150–450)
POTASSIUM SERPL-SCNC: 4.2 MMOL/L (ref 3.4–5.3)
PROT SERPL-MCNC: 7.5 G/DL (ref 6.4–8.3)
RBC # BLD AUTO: 5.29 10E6/UL (ref 4.4–5.9)
SODIUM SERPL-SCNC: 137 MMOL/L (ref 136–145)
WBC # BLD AUTO: 9.5 10E3/UL (ref 4–11)

## 2023-04-14 PROCEDURE — 93005 ELECTROCARDIOGRAM TRACING: CPT

## 2023-04-14 PROCEDURE — 36415 COLL VENOUS BLD VENIPUNCTURE: CPT | Performed by: EMERGENCY MEDICINE

## 2023-04-14 PROCEDURE — 85610 PROTHROMBIN TIME: CPT | Performed by: EMERGENCY MEDICINE

## 2023-04-14 PROCEDURE — G1010 CDSM STANSON: HCPCS

## 2023-04-14 PROCEDURE — 93010 ELECTROCARDIOGRAM REPORT: CPT | Performed by: INTERNAL MEDICINE

## 2023-04-14 PROCEDURE — 99285 EMERGENCY DEPT VISIT HI MDM: CPT | Mod: 25

## 2023-04-14 PROCEDURE — 80053 COMPREHEN METABOLIC PANEL: CPT | Performed by: EMERGENCY MEDICINE

## 2023-04-14 PROCEDURE — 99284 EMERGENCY DEPT VISIT MOD MDM: CPT | Performed by: EMERGENCY MEDICINE

## 2023-04-14 PROCEDURE — 83690 ASSAY OF LIPASE: CPT | Performed by: EMERGENCY MEDICINE

## 2023-04-14 PROCEDURE — 85004 AUTOMATED DIFF WBC COUNT: CPT | Performed by: EMERGENCY MEDICINE

## 2023-04-14 PROCEDURE — 250N000011 HC RX IP 250 OP 636: Performed by: RADIOLOGY

## 2023-04-14 RX ORDER — IOPAMIDOL 755 MG/ML
80 INJECTION, SOLUTION INTRAVASCULAR ONCE
Status: COMPLETED | OUTPATIENT
Start: 2023-04-14 | End: 2023-04-14

## 2023-04-14 RX ADMIN — IOPAMIDOL 80 ML: 755 INJECTION, SOLUTION INTRAVENOUS at 09:56

## 2023-04-14 ASSESSMENT — ENCOUNTER SYMPTOMS
ABDOMINAL PAIN: 1
EYES NEGATIVE: 1
NEUROLOGICAL NEGATIVE: 1
MUSCULOSKELETAL NEGATIVE: 1
CONSTITUTIONAL NEGATIVE: 1
RESPIRATORY NEGATIVE: 1
ENDOCRINE NEGATIVE: 1

## 2023-04-14 ASSESSMENT — ACTIVITIES OF DAILY LIVING (ADL): ADLS_ACUITY_SCORE: 35

## 2023-04-14 NOTE — ED TRIAGE NOTES
Patient was eating Asian pears last night around 2000. Patient states he ate a lot and felt like something was stuck in the upper abdomen. Checked his blood pressure last night and this am.  it was higher than normal. Has nit taken his meds yet this am

## 2023-04-14 NOTE — ED NOTES
"Pt states he has epigastric discomfort in the description of \"like something is stuck right there\". States he feels like he has to burp but can't.  States this is not colitis his colitis pains are lower in abdomen.  States normal BM this am brown soft and formed.   Denies fevers and cills but admits to a \"hot flash for a while\" last night. Denies nausea.  Is on ABTX from PCP 4/11 for elevated WBCs and abdominal pain with HX of colitis states they were going to do a CT but havent  "

## 2023-04-14 NOTE — DISCHARGE INSTRUCTIONS
What to expect when you have contrast    During your exam, we will inject  contrast  into your vein or artery. (Contrast is a clear liquid with iodine in it. It shows up on X-rays.)    You may feel warm or hot. You may have a metal taste in your mouth and a slight upset stomach. You may also feel pressure near the kidneys and bladder. These effects will last about 1 to 3 minutes.    Please tell us if you have:   Sneezing    Itching   Hives    Swelling in the face   A hoarse voice   Breathing problems   Other new symptoms    Serious problems are rare.  They may include:   Irregular heartbeat    Seizures   Kidney failure             Tissue damage   Shock     Death    If you have any problems during the exam, we  will treat them right away.    When you get home    Call your hospital if you have any new symptoms in the next 2 days, like hives or swelling. (Phone numbers are at the bottom of this page.) Or call your family doctor.     If you have wheezing or trouble breathing, call 911.    Self-care  -Drink at least 4 extra glasses of water today.   This reduces the stress on your kidneys.  -Keep taking your regular medicines.    The contrast will pass out of your body in your  Urine(pee). This will happen in the next 24 hours. You  will not feel this. Your urine will not  change color.    If you have kidney problems or take metformin    Drink 4 to 8 large glasses of water for the next  2 days, if you are not on a fluid restriction.    ?If you take metformin (Glucophage or Glucovance) for diabetes, keep taking it.      ?Your kidney function tests are abnormal.  If you take Metformin, do not take it for 48 hours. Please go to your clinic for a blood test within 3 days after your exam before the restarting this medicine.     (Note to provider:please give patient prescription for lab tests.)    ?Special instructions: -    I have read and understand the above information.    Patient Sign  Here:______________________________________Date:________Time:______    Staff Sign Here:________________________________________Date:_______Time:______      Radiology Departments:     ?[facility]: [phone] ?[place]: [phone]     ?[place]: [phone] ?[place]:[phone]      ?[place]: [phone]  ?[place]: [phone]  ?[place]:[phone]    ?[facility] [place]:[phone]  ?[facility] [place]:[phone]

## 2023-04-14 NOTE — ED PROVIDER NOTES
"  History     Chief Complaint   Patient presents with     Abdominal Pain     Hypertension     HPI  Samy Lu is a 67 year old male who was to the emergency department complaining of a left-sided abdominal pain for the past few days.  He states he was seen by his primary care provider on Tuesday.  He states he has a diagnosis of \"colitis\".  He was started on antibiotics.  He states that his primary provider wanted him to have an abdominal CT but he is not able to get it done until next Tuesday.  Patient states he was eating last night.  He states he ate fried rice and and Asian pear.  He states that he felt as though the food got \"stuck\" in his epigastric area.  He was able to swallow but he had discomfort at the time.  He states that he checked his blood pressure and his systolic blood pressure was \"184\".  He thought he should be evaluated today.  He states he is not having chest pain and is not short of breath.  He has not had diaphoresis.  He denies feeling dizzy or lightheaded.  He does admit that he still has some mild left-sided abdominal pain.  He states he has had some constipation as well.  He denies hematuria or dysuria.  He has not had fevers or chills.    Allergies:  No Known Allergies    Problem List:    Patient Active Problem List    Diagnosis Date Noted     ACP (advance care planning) 03/01/2017     Priority: Medium     Advance Care Planning 3/1/2017: ACP Review of Chart / Resources Provided:  Reviewed chart for advance care plan.  Samy Lu has been provided information and resources to begin or update their advance care plan.  Added by Lo Ortega                Past Medical History:    History reviewed. No pertinent past medical history.    Past Surgical History:    Past Surgical History:   Procedure Laterality Date     ABDOMEN SURGERY      right IHR     ABDOMEN SURGERY      2 hernia repairs     ABDOMEN SURGERY      Umbilical hernia repair     COLONOSCOPY  10/21/2009    Due " "     COLONOSCOPY N/A 2018    Procedure: COLONOSCOPY With POLYPECTOMY,RESOLUTION CLIP, BIOPSY;  Surgeon: Jesus Shi MD;  Location: [facility] OR       Family History:    Family History   Problem Relation Age of Onset     Cancer Father         Non Hodgkins Lymphoma     Diabetes Paternal Grandmother        Social History:  Marital Status:   [2]  Social History     Tobacco Use     Smoking status: Every Day     Packs/day: 0.25     Years: 53.00     Pack years: 13.25     Types: Cigarettes     Start date: 1969     Last attempt to quit: 2006     Years since quittin.2     Smokeless tobacco: Never   Substance Use Topics     Alcohol use: Yes     Comment: rare     Drug use: Yes     Types: Marijuana     Comment: \"for glaucoma\"        Medications:    amoxicillin-clavulanate (AUGMENTIN) 875-125 MG tablet  Multiple Vitamins-Minerals (ZINC PO)  timolol maleate (TIMOPTIC) 0.5 % ophthalmic solution          Review of Systems   Constitutional: Negative.    HENT: Negative.    Eyes: Negative.    Respiratory: Negative.    Cardiovascular: Positive for chest pain.   Gastrointestinal: Positive for abdominal pain.   Endocrine: Negative.    Genitourinary: Negative.    Musculoskeletal: Negative.    Neurological: Negative.    All other systems reviewed and found unremarkable    Physical Exam   BP: 151/93  Pulse: 73  Temp: 96.8  F (36  C)  Resp: 16  SpO2: 98 %      Physical Exam 67-year-old gentleman who is awake alert oriented person place and time very pleasant and cooperative with my exam.  HEENT normocephalic extraocular muscles intact pupils equally round and reactive light and oropharynx is clear.  Neck is supple full range of motion without pain.  Lungs are clear bilaterally.  Heart maintains a regular rate and rhythm.  S1 and S2 sounds are appreciated.  No murmur.  The abdomen is soft no mass no organomegaly no rebound patient does have mild to moderate voluntary guarding along the left side of his abdomen.  " No flank tenderness.  Extremities have full range of motion 5/5 strength.  No edema.  Neurologic exam no focal deficit.  Dermatologic exam no diffuse skin rashes or lesions.    ED Course              ED Course as of 04/14/23 1027   Fri Apr 14, 2023   1021 Patient remained very stable throughout his stay in the department.  I discussed lab EKG and CT findings with the patient.  We will cancel his abdominal CT that scheduled for next Tuesday.  I advised him to continue his antibiotic regimen.  The patient states he does not require any prescription pain medication at this time.  I have advised him however that he should return immediately for reassessment if his pain gets worse if he develops nausea vomiting or fever or any other concerning changes.  I will also advise him to follow-up with his primary care provider soon as possible              {EKG done reported by myself.  It shows a normal sinus rhythm.  Ventricular rate 64 bpm.  RI interval is 130 ms.  Corrected  ms.  There is no ST segment elevation or depression.  No inappropriate T wave inversion.  No evidence of acute ischemia or ectopy               Results for orders placed or performed during the hospital encounter of 04/14/23 (from the past 24 hour(s))   CBC with platelets differential    Narrative    The following orders were created for panel order CBC with platelets differential.  Procedure                               Abnormality         Status                     ---------                               -----------         ------                     CBC with platelets and d...[[id number]]  Abnormal            Final result                 Please view results for these tests on the individual orders.   INR   Result Value Ref [place]    INR 1.03 0.85 - 1.15   Comprehensive metabolic panel   Result Value Ref [place]    Sodium 137 136 - 145 mmol/L    Potassium 4.2 3.4 - 5.3 mmol/L    Chloride 99 98 - 107 mmol/L    Carbon Dioxide (CO2) 29 22 - 29 mmol/L     Anion Gap 9 7 - 15 mmol/L    Urea Nitrogen 10.1 8.0 - 23.0 mg/dL    Creatinine 0.97 0.67 - 1.17 mg/dL    Calcium 9.2 8.8 - 10.2 mg/dL    Glucose 102 (H) 70 - 99 mg/dL    Alkaline Phosphatase 60 40 - 129 U/L    AST 37 10 - 50 U/L    ALT 45 10 - 50 U/L    Protein Total 7.5 6.4 - 8.3 g/dL    Albumin 4.3 3.5 - 5.2 g/dL    Bilirubin Total 0.5 <=1.2 mg/dL    GFR Estimate 86 >60 mL/min/1.73m2   Lipase   Result Value Ref [place]    Lipase 41 13 - 60 U/L   CBC with platelets and differential   Result Value Ref [place]    WBC Count 9.5 4.0 - 11.0 10e3/uL    RBC Count 5.29 4.40 - 5.90 10e6/uL    Hemoglobin 17.6 13.3 - 17.7 g/dL    Hematocrit 49.2 40.0 - 53.0 %    MCV 93 78 - 100 fL    MCH 33.3 (H) 26.5 - 33.0 pg    MCHC 35.8 31.5 - 36.5 g/dL    RDW 12.4 10.0 - 15.0 %    Platelet Count 224 150 - 450 10e3/uL    % Neutrophils 73 %    % Lymphocytes 16 %    % Monocytes 9 %    % Eosinophils 1 %    % Basophils 1 %    % Immature Granulocytes 0 %    NRBCs per 100 WBC 0 <1 /100    Absolute Neutrophils 7.1 1.6 - 8.3 10e3/uL    Absolute Lymphocytes 1.5 0.8 - 5.3 10e3/uL    Absolute Monocytes 0.8 0.0 - 1.3 10e3/uL    Absolute Eosinophils 0.1 0.0 - 0.7 10e3/uL    Absolute Basophils 0.1 0.0 - 0.2 10e3/uL    Absolute Immature Granulocytes 0.0 <=0.4 10e3/uL    Absolute NRBCs 0.0 10e3/uL   CT Abdomen Pelvis w Contrast    Narrative    EXAMINATION: CT ABDOMEN PELVIS W CONTRAST, 4/14/2023 10:01 AM    TECHNIQUE:  Helical CT images from the lung bases through the  symphysis pubis were obtained  with IV contrast. Contrast dose: ISOVUE  370  80mL    COMPARISON: none    HISTORY: left sided abdominal pain    FINDINGS:    There is dependent atelectasis at the lung bases.    The liver is free of masses or biliary ductal enlargement. No  calcified gallstones are seen.    The the spleen and pancreas appear normal.    The adrenal glands are normal.    The right and left kidneys are free of masses or hydronephrosis.    The periaortic lymph nodes are normal in  caliber.    There is some pericolonic edema seen with associated bowel wall  thickening in the junction of the descending and since sigmoid colon  this finding is most consistent with early colitis. Careful follow-up  to exclude malignancy is recommended.    In the pelvis the bladder and rectum appear normal.    The regional skeleton is intact      Impression    IMPRESSION: Findings most consistent with early diverticulitis in the  sigmoid colon. In light of the bowel wall thickening follow-up to  exclude malignancy is recommended..    SUSAN LIMA MD         SYSTEM ID:  [id number]       Medications   sodium chloride (PF) 0.9% PF flush 50 mL (50 mLs Intravenous $Given 4/14/23 0956)   iopamidol (ISOVUE-370) solution 80 mL (80 mLs Intravenous $Given 4/14/23 0956)       Assessments & Plan (with Medical Decision Making)     I have reviewed the nursing notes.    I have reviewed the findings, diagnosis, plan and need for follow up with the patient.          Medical Decision Making  The patient's presentation was of moderate complexity (an acute illness with systemic symptoms).    The patient's evaluation involved:  ordering and/or review of 3+ test(s) in this encounter (see separate area of note for details)    The patient's management necessitated moderate risk (prescription drug management including medications given in the ED).        New Prescriptions    No medications on file       Final diagnoses:   Abdominal pain, left lower quadrant   Diverticulitis of colon       4/14/2023   [facility] EM[facility]     Mario Hansen,   04/14/23 1027

## 2023-04-25 NOTE — PROGRESS NOTES
"  Assessment & Plan     Diverticulitis of colon  Resolved.  Great.  Reviewed CT.  augmentin sent to keep at home and have on hand for possible recurrence.  He clearly knew when he had this.  WBC had come back down from the abx before he had the CT.  Plan to simply f/u with any concerns.  Thickening in the bowel wall c/w diverticulitis.  He is UTD on colon screen until 2028 so we talked and will not do another CT to ensure resolution knowing we have kept up on colon screen.    - amoxicillin-clavulanate (AUGMENTIN) 875-125 MG tablet; Take 1 tablet by mouth 2 times daily for 10 days    Atelectasis  Reviewed.  No threat at all and I explained how this works.                 BMI:   Estimated body mass index is 27.98 kg/m  as calculated from the following:    Height as of 8/10/22: 1.727 m (5' 8\").    Weight as of this encounter: 83.5 kg (184 lb).           No follow-ups on file.    Reji Flynn MD  [facility]    Suman Pablo is a 67 year old, presenting for the following health issues:  Results         View : No data to display.              [facility] in [place]     ED/UC Followup:    Facility:  [facility] [place]  Date of visit: 4/14/23  Reason for visit: diverticulitis   Current Status: pt would like to review CT scan           Review of Systems   Constitutional, HEENT, cardiovascular, pulmonary, gi and gu systems are negative, except as otherwise noted.      Objective    /70   Pulse 72   Temp 98.2  F (36.8  C) (Tympanic)   Wt 83.5 kg (184 lb)   SpO2 94%   BMI 27.98 kg/m    Body mass index is 27.98 kg/m .  Physical Exam   GENERAL: healthy, alert and no distress  NECK: no adenopathy, no asymmetry, masses, or scars and thyroid normal to palpation  RESP: lungs clear to auscultation - no rales, rhonchi or wheezes  CV: regular rate and rhythm, normal S1 S2, no S3 or S4, no murmur, click or rub, no peripheral edema and peripheral pulses strong  ABDOMEN: soft, nontender, no hepatosplenomegaly, no masses and " bowel sounds normal  MS: no gross musculoskeletal defects noted, no edema

## 2023-04-26 ENCOUNTER — OFFICE VISIT (OUTPATIENT)
Dept: FAMILY MEDICINE | Facility: OTHER | Age: 68
End: 2023-04-26
Attending: FAMILY MEDICINE
Payer: COMMERCIAL

## 2023-04-26 VITALS
WEIGHT: 184 LBS | BODY MASS INDEX: 27.98 KG/M2 | DIASTOLIC BLOOD PRESSURE: 70 MMHG | HEART RATE: 72 BPM | SYSTOLIC BLOOD PRESSURE: 118 MMHG | OXYGEN SATURATION: 94 % | TEMPERATURE: 98.2 F

## 2023-04-26 DIAGNOSIS — J98.11 ATELECTASIS: ICD-10-CM

## 2023-04-26 DIAGNOSIS — K57.32 DIVERTICULITIS OF COLON: Primary | ICD-10-CM

## 2023-04-26 PROCEDURE — G0463 HOSPITAL OUTPT CLINIC VISIT: HCPCS

## 2023-04-26 PROCEDURE — 99213 OFFICE O/P EST LOW 20 MIN: CPT | Performed by: FAMILY MEDICINE

## 2023-04-26 ASSESSMENT — PAIN SCALES - GENERAL: PAINLEVEL: NO PAIN (0)

## 2023-10-01 ENCOUNTER — HEALTH MAINTENANCE LETTER (OUTPATIENT)
Age: 68
End: 2023-10-01

## 2024-02-07 ENCOUNTER — OFFICE VISIT (OUTPATIENT)
Dept: FAMILY MEDICINE | Facility: OTHER | Age: 69
End: 2024-02-07
Attending: FAMILY MEDICINE
Payer: COMMERCIAL

## 2024-02-07 VITALS
HEART RATE: 68 BPM | WEIGHT: 187 LBS | BODY MASS INDEX: 28.43 KG/M2 | SYSTOLIC BLOOD PRESSURE: 114 MMHG | OXYGEN SATURATION: 95 % | DIASTOLIC BLOOD PRESSURE: 68 MMHG | TEMPERATURE: 97.9 F

## 2024-02-07 DIAGNOSIS — L91.8 SKIN TAG: Primary | ICD-10-CM

## 2024-02-07 PROCEDURE — G0463 HOSPITAL OUTPT CLINIC VISIT: HCPCS

## 2024-02-07 PROCEDURE — 99213 OFFICE O/P EST LOW 20 MIN: CPT | Performed by: FAMILY MEDICINE

## 2024-02-07 ASSESSMENT — PAIN SCALES - GENERAL: PAINLEVEL: NO PAIN (0)

## 2024-02-07 NOTE — PROGRESS NOTES
Assessment & Plan     Skin tag  Near the eye.  Fairly broad base so I didn't want to do it here.  Sent to ophthalmology.  It rubs on his glasses and is bothersome.    - Adult Eye  Referral; Future                  No follow-ups on file.    Subjective   Samy is a 68 year old, presenting for the following health issues:  Derm Problem    HPI     Derm problem     Duration: ongoing   Description (location/character/radiation): right side of nose   Intensity:  mild  Accompanying signs and symptoms: has gotten bigger   History (similar episodes/previous evaluation): None  Precipitating or alleviating factors: None  Therapies tried and outcome: None              Review of Systems  Constitutional, HEENT, cardiovascular, pulmonary, gi and gu systems are negative, except as otherwise noted.      Objective    /68   Pulse 68   Temp 97.9  F (36.6  C) (Tympanic)   Wt 84.8 kg (187 lb)   SpO2 95%   BMI 28.43 kg/m    Body mass index is 28.43 kg/m .  Physical Exam   GENERAL: alert and no distress  EYES: Eyes grossly normal to inspection, PERRL and conjunctivae and sclerae normal  NECK: no adenopathy, no asymmetry, masses, or scars  MS: no gross musculoskeletal defects noted, no edema  SKIN:  likely skin tag near medial right eye.          Signed Electronically by: Reji Flynn MD

## 2024-02-15 PROCEDURE — 88305 TISSUE EXAM BY PATHOLOGIST: CPT | Mod: TC | Performed by: OPHTHALMOLOGY

## 2024-02-15 PROCEDURE — 88305 TISSUE EXAM BY PATHOLOGIST: CPT | Mod: 26 | Performed by: PATHOLOGY

## 2024-02-16 ENCOUNTER — LAB REQUISITION (OUTPATIENT)
Dept: LAB | Facility: HOSPITAL | Age: 69
End: 2024-02-16
Payer: MEDICARE

## 2024-02-16 DIAGNOSIS — L90.9 ATROPHIC DISORDER OF SKIN, UNSPECIFIED: ICD-10-CM

## 2024-02-20 LAB
PATH REPORT.ADDENDUM SPEC: NORMAL
PATH REPORT.COMMENTS IMP SPEC: NORMAL
PATH REPORT.COMMENTS IMP SPEC: NORMAL
PATH REPORT.FINAL DX SPEC: NORMAL
PATH REPORT.GROSS SPEC: NORMAL
PATH REPORT.MICROSCOPIC SPEC OTHER STN: NORMAL
PATH REPORT.RELEVANT HX SPEC: NORMAL
PHOTO IMAGE: NORMAL

## 2024-03-26 SDOH — HEALTH STABILITY: PHYSICAL HEALTH: ON AVERAGE, HOW MANY MINUTES DO YOU ENGAGE IN EXERCISE AT THIS LEVEL?: 0 MIN

## 2024-03-26 SDOH — HEALTH STABILITY: PHYSICAL HEALTH: ON AVERAGE, HOW MANY DAYS PER WEEK DO YOU ENGAGE IN MODERATE TO STRENUOUS EXERCISE (LIKE A BRISK WALK)?: 0 DAYS

## 2024-03-26 ASSESSMENT — SOCIAL DETERMINANTS OF HEALTH (SDOH): HOW OFTEN DO YOU GET TOGETHER WITH FRIENDS OR RELATIVES?: THREE TIMES A WEEK

## 2024-03-26 NOTE — COMMUNITY RESOURCES LIST (ENGLISH)
March 26, 2024           YOUR PERSONALIZED LIST OF SERVICES & PROGRAMS           & RECREATION    Sports      [employer] - Sports clubs and recreational activities  [address] [place] [id number] (Distance: 4.5 miles)  Phone: [phone]  Language: English  Fee: Self pay      [facility] - Adult Enrichment  Phone: [phone]  Website: [email or web address]  Language: English  Hours: Mon 7:30 AM - 4:00 PM Tue 7:30 AM - 4:00 PM Wed 7:30 AM - 4:00 PM Thu 7:30 AM - 4:00 PM Fri 7:30 AM - 4:00 PM    Classes/Groups      [employer] - Yoga or Pilates classes  [address] [place] [address] (Distance: 4.5 miles)  Phone: [phone]  Language: English  Fee: Self pay      [employer] - Group fitness classes  [address] [place] [address] (Distance: 4.5 miles)  Phone: [phone]  Language: English  Fee: Self pay      [facility] - Adult Enrichment  Phone: [phone]  Website: [email or web address]  Language: English  Hours: Mon 7:30 AM - 4:00 PM Tue 7:30 AM - 4:00 PM Wed 7:30 AM - 4:00 PM Thu 7:30 AM - 4:00 PM Fri 7:30 AM - 4:00 PM               IMPORTANT NUMBERS & WEBSITES        Emergency Services  911  .   [employer] Way  211 http://211unitedway.org  .   Poison Control  (111) 300-5394 http://mnpoison.org http://wisconsinpoison.org  .     Suicide and Crisis Lifeline  988 http://988lifeline.org  .   Childhelp National Child Abuse Hotline  185.921.4948 http://Childhelphotline.org   .   National Sexual Assault Hotline  (964) 970-8261 (HOPE) http://Rainn.org   .     National Runaway Safeline  (664) 675-8598 (RUNAWAY) http://1800runaway.org  .   Pregnancy & Postpartum Support  Call/text 787-083-5293  [place]: http://ppsupportmn.org  [place]: http://[employer].com/wi  .   Substance Abuse National Helpline ([facility])  301-746-IRJT (5938) http://Findtreatment.gov   .                 DISCLAIMER: [employer] does not endorse any service providers mentioned in this resource list. [employer] does not guarantee that the services mentioned in this resource list will be available to you or will improve your health or wellness.    [facility]

## 2024-03-27 ENCOUNTER — OFFICE VISIT (OUTPATIENT)
Dept: FAMILY MEDICINE | Facility: OTHER | Age: 69
End: 2024-03-27
Attending: FAMILY MEDICINE
Payer: COMMERCIAL

## 2024-03-27 VITALS
HEIGHT: 68 IN | OXYGEN SATURATION: 97 % | HEART RATE: 65 BPM | TEMPERATURE: 96.9 F | BODY MASS INDEX: 28.49 KG/M2 | SYSTOLIC BLOOD PRESSURE: 112 MMHG | DIASTOLIC BLOOD PRESSURE: 76 MMHG | WEIGHT: 188 LBS

## 2024-03-27 DIAGNOSIS — Z12.5 SCREENING FOR PROSTATE CANCER: ICD-10-CM

## 2024-03-27 DIAGNOSIS — M25.50 POLYARTHRALGIA: ICD-10-CM

## 2024-03-27 DIAGNOSIS — Z13.220 LIPID SCREENING: ICD-10-CM

## 2024-03-27 DIAGNOSIS — Z00.00 MEDICARE ANNUAL WELLNESS VISIT, SUBSEQUENT: Primary | ICD-10-CM

## 2024-03-27 DIAGNOSIS — L98.9 SKIN LESION: ICD-10-CM

## 2024-03-27 PROCEDURE — 99213 OFFICE O/P EST LOW 20 MIN: CPT | Mod: 25 | Performed by: FAMILY MEDICINE

## 2024-03-27 PROCEDURE — G0463 HOSPITAL OUTPT CLINIC VISIT: HCPCS

## 2024-03-27 PROCEDURE — G0439 PPPS, SUBSEQ VISIT: HCPCS | Performed by: FAMILY MEDICINE

## 2024-03-27 RX ORDER — TIMOLOL MALEATE 5 MG/ML
1 SOLUTION/ DROPS OPHTHALMIC DAILY
COMMUNITY
Start: 2024-03-14

## 2024-03-27 ASSESSMENT — PAIN SCALES - GENERAL: PAINLEVEL: SEVERE PAIN (6)

## 2024-03-27 NOTE — PROGRESS NOTES
"Preventive Care Visit  [facility]  Reji Flynn MD, Family Medicine  Mar 27, 2024      Assessment & Plan     Lipid screening  Update.   - Comprehensive metabolic panel; Future  - Lipid Profile; Future    Screening for prostate cancer  Update.   - Prostate Specific Antigen Screen; Future    Medicare annual wellness visit, subsequent  Doing great.  Routine cares.      Polyarthralgia  Especially the hands.  Workup as below.  Suspect OA overall but it is in the MCP and consider RA or other.  He understands.    - Lyme Disease Total Abs Bld with Reflex to Confirm CLIA; Future  - Rheumatoid factor; Future  - ESR: Erythrocyte sedimentation rate; Future    Skin lesion  Right ear.  Plan to monitor and follow.  If ongoing we will get some cryo there.                BMI  Estimated body mass index is 28.59 kg/m  as calculated from the following:    Height as of this encounter: 1.727 m (5' 8\").    Weight as of this encounter: 85.3 kg (188 lb).       Counseling  Appropriate preventive services were discussed with this patient, including applicable screening as appropriate for fall prevention, nutrition, physical activity, Tobacco-use cessation, weight loss and cognition.  Checklist reviewing preventive services available has been given to the patient.  Reviewed patient's diet, addressing concerns and/or questions.   The patient was instructed to see the dentist every 6 months.   Information on urinary incontinence and treatment options given to patient.           No follow-ups on file.    Suman Pablo is a 68 year old, presenting for the following:  Wellness Visit         Health Care Directive  Patient does not have a Health Care Directive or Living Will: Discussed advance care planning with patient; information given to patient to review.    HPI            3/26/2024   General Health   How would you rate your overall physical health? Good   Feel stress (tense, anxious, or unable to sleep) Not at all         " 3/26/2024   Nutrition   Diet: Regular (no restrictions)         3/26/2024   Exercise   Days per week of moderate/strenous exercise 0 days   Average minutes spent exercising at this level 0 min   (!) EXERCISE CONCERN      3/26/2024   Social Factors   Frequency of gathering with friends or relatives Three times a week   Worry food won't last until get money to buy more No   Food not last or not have enough money for food? No   Do you have housing?  Yes   Are you worried about losing your housing? No   Lack of transportation? No   Unable to get utilities (heat,electricity)? No         3/26/2024   Fall Risk   Fallen 2 or more times in the past year? No   Trouble with walking or balance? No          3/26/2024   Activities of Daily Living- Home Safety   Needs help with the following daily activites None of the above   Safety concerns in the home None of the above         3/26/2024   Dental   Dentist two times every year? (!) NO         3/26/2024   Hearing Screening   Hearing concerns? None of the above         3/26/2024   Driving Risk Screening   Patient/family members have concerns about driving No         3/26/2024   General Alertness/Fatigue Screening   Have you been more tired than usual lately? No         3/26/2024   Urinary Incontinence Screening   Bothered by leaking urine in past 6 months Yes         3/26/2024   TB Screening   Were you born outside of the [place]? No         Today's PHQ-2 Score:       3/26/2024     7:57 AM   PHQ-2 ( 1999 [employer])   Q1: Little interest or pleasure in doing things 0   Q2: Feeling down, depressed or hopeless 0   PHQ-2 Score 0   Q1: Little interest or pleasure in doing things Not at all   Q2: Feeling down, depressed or hopeless Not at all   PHQ-2 Score 0           3/26/2024   Substance Use   Alcohol more than 3/day or more than 7/wk No   Do you have a current opioid prescription? No   How severe/bad is pain from 1 to 10? 2/10   Do you use any other substances recreationally? (!) CANNABIS  "PRODUCTS     Social History     Tobacco Use    Smoking status: Former     Packs/day: 0.25     Years: 53.00     Additional pack years: 0.00     Total pack years: 13.25     Types: Cigarettes     Start date: 1969     Quit date: 2006     Years since quittin.2    Smokeless tobacco: Never   Substance Use Topics    Alcohol use: Yes     Comment: rare    Drug use: Yes     Types: Marijuana     Comment: \"for glaucoma\"       Last PSA:   PSA   Date Value Ref Range Status   2021 0.93 0 - 4 ug/L Final     Comment:     Assay Method:  Chemiluminescence using Siemens Vista analyzer     Prostate Specific Antigen Screen   Date Value Ref Range Status   2022 1.33 0.00 - 4.00 ug/L Final     ASCVD Risk   The ASCVD Risk score (Cherelle HANCOCK, et al., 2019) failed to calculate for the following reasons:    The valid total cholesterol range is 130 to 320 mg/dL            Reviewed and updated as needed this visit by Provider                    No past medical history on file.  Past Surgical History:   Procedure Laterality Date    ABDOMEN SURGERY      right IHR    ABDOMEN SURGERY      2 hernia repairs    ABDOMEN SURGERY      Umbilical hernia repair    COLONOSCOPY  10/21/2009    Due     COLONOSCOPY N/A 2018    Procedure: COLONOSCOPY With POLYPECTOMY,RESOLUTION CLIP, BIOPSY;  Surgeon: Jesus Shi MD;  Location: [facility]     Current providers sharing in care for this patient include:  Patient Care Team:  Reji Flynn MD as PCP - General (Family Practice)  Reji Flynn MD as Assigned PCP    The following health maintenance items are reviewed in Epic and correct as of today:  Health Maintenance   Topic Date Due    DTAP/TDAP/TD IMMUNIZATION (1 - Tdap) Never done    ZOSTER IMMUNIZATION (1 of 2) Never done    RSV VACCINE (Pregnancy & 60+) (1 - 1-dose 60+ series) Never done    Pneumococcal Vaccine: 65+ Years (1 of 1 - PCV) Never done    MEDICARE ANNUAL WELLNESS VISIT  2023    LIPID  " "08/01/2023    INFLUENZA VACCINE (1) Never done    COVID-19 Vaccine (5 - 2023-24 season) 09/01/2023    FALL RISK ASSESSMENT  03/27/2025    GLUCOSE  04/14/2026    ADVANCE CARE PLANNING  08/01/2027    COLORECTAL CANCER SCREENING  11/19/2028    HEPATITIS C SCREENING  Completed    PHQ-2 (once per calendar year)  Completed    AORTIC ANEURYSM SCREENING (SYSTEM ASSIGNED)  Completed    IPV IMMUNIZATION  Aged Out    HPV IMMUNIZATION  Aged Out    MENINGITIS IMMUNIZATION  Aged Out    RSV MONOCLONAL ANTIBODY  Aged Out         Review of Systems  CONSTITUTIONAL: NEGATIVE for fever, chills, change in weight  INTEGUMENTARY/SKIN: NEGATIVE for worrisome rashes, moles or lesions  EYES: NEGATIVE for vision changes or irritation  ENT/MOUTH: NEGATIVE for ear, mouth and throat problems  RESP: NEGATIVE for significant cough or SOB  BREAST: NEGATIVE for masses, tenderness or discharge  CV: NEGATIVE for chest pain, palpitations or peripheral edema  GI: NEGATIVE for nausea, abdominal pain, heartburn, or change in bowel habits  : NEGATIVE for frequency, dysuria, or hematuria  MUSCULOSKELETAL: NEGATIVE for significant arthralgias or myalgia  NEURO: NEGATIVE for weakness, dizziness or paresthesias  ENDOCRINE: NEGATIVE for temperature intolerance, skin/hair changes  HEME: NEGATIVE for bleeding problems  PSYCHIATRIC: NEGATIVE for changes in mood or affect     Objective    Exam  /76   Pulse 65   Temp 96.9  F (36.1  C) (Tympanic)   Ht 1.727 m (5' 8\")   Wt 85.3 kg (188 lb)   SpO2 97%   BMI 28.59 kg/m     Estimated body mass index is 28.59 kg/m  as calculated from the following:    Height as of this encounter: 1.727 m (5' 8\").    Weight as of this encounter: 85.3 kg (188 lb).    Physical Exam  GENERAL: alert and no distress  EYES: Eyes grossly normal to inspection, PERRL and conjunctivae and sclerae normal  HENT: ear canals and TM's normal, nose and mouth without ulcers or lesions  NECK: no adenopathy, no asymmetry, masses, or " scars  RESP: lungs clear to auscultation - no rales, rhonchi or wheezes  CV: regular rate and rhythm, normal S1 S2, no S3 or S4, no murmur, click or rub, no peripheral edema  ABDOMEN: soft, nontender, no hepatosplenomegaly, no masses and bowel sounds normal  MS: no gross musculoskeletal defects noted, no edema  SKIN: no suspicious lesions or rashes  NEURO: Normal strength and tone, mentation intact and speech normal  PSYCH: mentation appears normal, affect normal/bright         3/27/2024   Mini Cog   Clock Draw Score 2 Normal   3 Item Recall 1 object recalled   Mini Cog Total Score 3         Vision Screen         Signed Electronically by: Reji Flynn MD

## 2024-03-28 ENCOUNTER — LAB (OUTPATIENT)
Dept: LAB | Facility: OTHER | Age: 69
End: 2024-03-28
Payer: MEDICARE

## 2024-03-28 DIAGNOSIS — L98.9 SKIN LESION: ICD-10-CM

## 2024-03-28 DIAGNOSIS — M25.50 POLYARTHRALGIA: ICD-10-CM

## 2024-03-28 DIAGNOSIS — Z00.00 MEDICARE ANNUAL WELLNESS VISIT, SUBSEQUENT: ICD-10-CM

## 2024-03-28 DIAGNOSIS — Z13.220 LIPID SCREENING: ICD-10-CM

## 2024-03-28 DIAGNOSIS — Z12.5 SCREENING FOR PROSTATE CANCER: ICD-10-CM

## 2024-03-28 LAB
ALBUMIN SERPL BCG-MCNC: 4.3 G/DL (ref 3.5–5.2)
ALP SERPL-CCNC: 44 U/L (ref 40–150)
ALT SERPL W P-5'-P-CCNC: 18 U/L (ref 0–70)
ANION GAP SERPL CALCULATED.3IONS-SCNC: 8 MMOL/L (ref 7–15)
AST SERPL W P-5'-P-CCNC: 22 U/L (ref 0–45)
BILIRUB SERPL-MCNC: 0.8 MG/DL
BUN SERPL-MCNC: 16.1 MG/DL (ref 8–23)
CALCIUM SERPL-MCNC: 9.1 MG/DL (ref 8.8–10.2)
CHLORIDE SERPL-SCNC: 101 MMOL/L (ref 98–107)
CHOLEST SERPL-MCNC: 146 MG/DL
CREAT SERPL-MCNC: 1.13 MG/DL (ref 0.67–1.17)
DEPRECATED HCO3 PLAS-SCNC: 28 MMOL/L (ref 22–29)
EGFRCR SERPLBLD CKD-EPI 2021: 71 ML/MIN/1.73M2
ERYTHROCYTE [SEDIMENTATION RATE] IN BLOOD BY WESTERGREN METHOD: 5 MM/HR (ref 0–20)
FASTING STATUS PATIENT QL REPORTED: YES
GLUCOSE SERPL-MCNC: 102 MG/DL (ref 70–99)
HDLC SERPL-MCNC: 49 MG/DL
LDLC SERPL CALC-MCNC: 84 MG/DL
NONHDLC SERPL-MCNC: 97 MG/DL
POTASSIUM SERPL-SCNC: 4.2 MMOL/L (ref 3.4–5.3)
PROT SERPL-MCNC: 7.3 G/DL (ref 6.4–8.3)
PSA SERPL DL<=0.01 NG/ML-MCNC: 1.24 NG/ML (ref 0–4.5)
SODIUM SERPL-SCNC: 137 MMOL/L (ref 135–145)
TRIGL SERPL-MCNC: 64 MG/DL

## 2024-03-28 PROCEDURE — 86618 LYME DISEASE ANTIBODY: CPT | Mod: ZL

## 2024-03-28 PROCEDURE — 36415 COLL VENOUS BLD VENIPUNCTURE: CPT | Mod: ZL

## 2024-03-28 PROCEDURE — 85652 RBC SED RATE AUTOMATED: CPT | Mod: ZL

## 2024-03-28 PROCEDURE — 80061 LIPID PANEL: CPT | Mod: ZL

## 2024-03-28 PROCEDURE — 86431 RHEUMATOID FACTOR QUANT: CPT | Mod: ZL

## 2024-03-28 PROCEDURE — G0103 PSA SCREENING: HCPCS | Mod: ZL

## 2024-03-28 PROCEDURE — 82040 ASSAY OF SERUM ALBUMIN: CPT | Mod: ZL

## 2024-03-29 LAB
B BURGDOR IGG+IGM SER QL: 0.24
RHEUMATOID FACT SERPL-ACNC: <10 IU/ML

## 2024-06-05 NOTE — PROGRESS NOTES
"  Assessment & Plan     Skin lesion  Likely early AK.  Right ear in the pinna. Cryo today.  He will monitor and follow.    - DESTRUCT BENIGN LESION, UP TO 14        Procedure:  cryo x 3 to skin lesion right ear without complication.  Well tolerated.  No blood loss.      BMI  Estimated body mass index is 28.43 kg/m  as calculated from the following:    Height as of 3/27/24: 1.727 m (5' 8\").    Weight as of this encounter: 84.8 kg (187 lb).           No follow-ups on file.    Suman Hidalgo is a 69 year old, presenting for the following health issues:  Ear Problem        6/6/2024     7:59 AM   Additional Questions   Roomed by Aisha   Accompanied by self     HPI     Ear problem     Duration: ongoing   Description (location/character/radiation): inside of right ear   Intensity:  moderate  Accompanying signs and symptoms: lump, sometimes starts bleeding   History (similar episodes/previous evaluation): None  Precipitating or alleviating factors: None  Therapies tried and outcome: None              Review of Systems  Constitutional, HEENT, cardiovascular, pulmonary, gi and gu systems are negative, except as otherwise noted.      Objective    /80   Pulse 61   Temp 96.9  F (36.1  C) (Tympanic)   Wt 84.8 kg (187 lb)   SpO2 98%   BMI 28.43 kg/m    Body mass index is 28.43 kg/m .  Physical Exam   GENERAL: alert and no distress  NECK: no adenopathy, no asymmetry, masses, or scars  RESP: lungs clear to auscultation - no rales, rhonchi or wheezes  CV: regular rate and rhythm, normal S1 S2, no S3 or S4, no murmur, click or rub, no peripheral edema  ABDOMEN: soft, nontender, no hepatosplenomegaly, no masses and bowel sounds normal  MS: no gross musculoskeletal defects noted, no edema  SKIN: keratotic lesion about 1 cm in diameter with some scale on the right pinna, almost right in the center.            Signed Electronically by: Reji Flynn MD    "

## 2024-06-06 ENCOUNTER — OFFICE VISIT (OUTPATIENT)
Dept: FAMILY MEDICINE | Facility: OTHER | Age: 69
End: 2024-06-06
Attending: FAMILY MEDICINE
Payer: MEDICARE

## 2024-06-06 VITALS
TEMPERATURE: 96.9 F | SYSTOLIC BLOOD PRESSURE: 112 MMHG | WEIGHT: 187 LBS | BODY MASS INDEX: 28.43 KG/M2 | HEART RATE: 61 BPM | DIASTOLIC BLOOD PRESSURE: 80 MMHG | OXYGEN SATURATION: 98 %

## 2024-06-06 DIAGNOSIS — L98.9 SKIN LESION: Primary | ICD-10-CM

## 2024-06-06 PROCEDURE — G0463 HOSPITAL OUTPT CLINIC VISIT: HCPCS

## 2024-06-06 PROCEDURE — 17110 DESTRUCTION B9 LES UP TO 14: CPT | Performed by: FAMILY MEDICINE

## 2024-06-06 ASSESSMENT — PAIN SCALES - GENERAL: PAINLEVEL: NO PAIN (0)

## 2024-11-12 NOTE — ED NOTES
AVS reviewed. All questions and concerns answered. No further questions at this time.  Pt fully dressed, refusing discharge VS   {OT Note Title:858749}         OBJECTIVE                                                                                                                          {OPTIONAL PT OT Objective Tabs (Optional):4051976}    {OPTIONAL Modality Treatment Tab (Optional):3445640}          Therapy procedure time and total treatment time can be found documented on the Time Entry flowsheet

## 2024-12-23 ENCOUNTER — TELEPHONE (OUTPATIENT)
Dept: FAMILY MEDICINE | Facility: OTHER | Age: 69
End: 2024-12-23

## 2024-12-23 NOTE — PROGRESS NOTES
"  Assessment & Plan     Diverticulitis of colon  Discussed.  Augmentin taken as our previous plan and marked improvement.  Refill sent to keep on hand for the 1-2 of these he gets annually.  Cbc stable with slight wbc elevation.    - amoxicillin-clavulanate (AUGMENTIN) 875-125 MG tablet; Take 1 tablet by mouth 2 times daily.    Melena  By history.  He stopped a ginseng preparation that was black and it went away.  UTD on colon.  No abd pain.  Hgb normal.  Report ongoing sx like this but ultimately I think it was the ginseng and he's not looking for scope or to go on a PPI.  Nice review and for now he will monitor sx with the normal hgb.    - CBC with platelets and differential; Future  - CBC with platelets and differential          BMI  Estimated body mass index is 28.28 kg/m  as calculated from the following:    Height as of 3/27/24: 1.727 m (5' 8\").    Weight as of this encounter: 84.4 kg (186 lb).             No follow-ups on file.    Suman Hidalgo is a 69 year old, presenting for the following health issues:  RECHECK        12/30/2024     9:58 AM   Additional Questions   Roomed by Aisha     History of Present Illness       Reason for visit:  Colitus check up    He eats 2-3 servings of fruits and vegetables daily.He consumes 2 sweetened beverage(s) daily.He exercises with enough effort to increase his heart rate 10 to 19 minutes per day.  He exercises with enough effort to increase his heart rate 3 or less days per week.   He is taking medications regularly.         Colitis flare     Duration: 2 weeks has resolved now   Description (location/character/radiation): colitis   Intensity:  mild  Accompanying signs and symptoms: none now-pt wanted to follow up and refill meds   History (similar episodes/previous evaluation): None  Precipitating or alleviating factors: None  Therapies tried and outcome: augmentin             Review of Systems  Constitutional, HEENT, cardiovascular, pulmonary, gi and gu systems are " negative, except as otherwise noted.      Objective    /68   Pulse 70   Temp 98.2  F (36.8  C) (Tympanic)   Wt 84.4 kg (186 lb)   SpO2 95%   BMI 28.28 kg/m    Body mass index is 28.28 kg/m .  Physical Exam   GENERAL: alert and no distress  NECK: no adenopathy, no asymmetry, masses, or scars  RESP: lungs clear to auscultation - no rales, rhonchi or wheezes  CV: regular rate and rhythm, normal S1 S2, no S3 or S4, no murmur, click or rub, no peripheral edema  ABDOMEN: soft, nontender, no hepatosplenomegaly, no masses and bowel sounds normal  MS: no gross musculoskeletal defects noted, no edema    Cbc stable with slight wbc elevation.      The longitudinal plan of care for the diagnosis(es)/condition(s) as documented were addressed during this visit. Due to the added complexity in care, I will continue to support Rocky in the subsequent management and with ongoing continuity of care.        Signed Electronically by: Reji Flynn MD

## 2024-12-23 NOTE — TELEPHONE ENCOUNTER
11:24 AM    Reason for Call: OVERBOOK    Patient is having the following symptoms: colitis flare up.    The patient is requesting an appointment for next week with Dr Flynn.    Was an appointment offered for this call? No  If yes : Appointment type              Date    Preferred method for responding to this message: Telephone Call  What is your phone number ?[phone]     If we cannot reach you directly, may we leave a detailed response at the number you provided? Yes    Can this message wait until your PCP/provider returns, if unavailable today? Not applicable    Sera Mcadams

## 2024-12-30 ENCOUNTER — OFFICE VISIT (OUTPATIENT)
Dept: FAMILY MEDICINE | Facility: OTHER | Age: 69
End: 2024-12-30
Attending: FAMILY MEDICINE
Payer: MEDICARE

## 2024-12-30 VITALS
DIASTOLIC BLOOD PRESSURE: 68 MMHG | OXYGEN SATURATION: 95 % | BODY MASS INDEX: 28.28 KG/M2 | HEART RATE: 70 BPM | SYSTOLIC BLOOD PRESSURE: 108 MMHG | TEMPERATURE: 98.2 F | WEIGHT: 186 LBS

## 2024-12-30 DIAGNOSIS — K57.32 DIVERTICULITIS OF COLON: ICD-10-CM

## 2024-12-30 DIAGNOSIS — K92.1 MELENA: Primary | ICD-10-CM

## 2024-12-30 LAB
BASOPHILS # BLD AUTO: 0 10E3/UL (ref 0–0.2)
BASOPHILS NFR BLD AUTO: 0 %
EOSINOPHIL # BLD AUTO: 0.2 10E3/UL (ref 0–0.7)
EOSINOPHIL NFR BLD AUTO: 2 %
ERYTHROCYTE [DISTWIDTH] IN BLOOD BY AUTOMATED COUNT: 12.2 % (ref 10–15)
HCT VFR BLD AUTO: 46.7 % (ref 40–53)
HGB BLD-MCNC: 16.6 G/DL (ref 13.3–17.7)
IMM GRANULOCYTES # BLD: 0.1 10E3/UL
IMM GRANULOCYTES NFR BLD: 1 %
LYMPHOCYTES # BLD AUTO: 1.9 10E3/UL (ref 0.8–5.3)
LYMPHOCYTES NFR BLD AUTO: 16 %
MCH RBC QN AUTO: 32.4 PG (ref 26.5–33)
MCHC RBC AUTO-ENTMCNC: 35.5 G/DL (ref 31.5–36.5)
MCV RBC AUTO: 91 FL (ref 78–100)
MONOCYTES # BLD AUTO: 0.7 10E3/UL (ref 0–1.3)
MONOCYTES NFR BLD AUTO: 5 %
NEUTROPHILS # BLD AUTO: 9.3 10E3/UL (ref 1.6–8.3)
NEUTROPHILS NFR BLD AUTO: 77 %
PLATELET # BLD AUTO: 255 10E3/UL (ref 150–450)
RBC # BLD AUTO: 5.12 10E6/UL (ref 4.4–5.9)
WBC # BLD AUTO: 12.2 10E3/UL (ref 4–11)

## 2024-12-30 PROCEDURE — 85048 AUTOMATED LEUKOCYTE COUNT: CPT | Mod: ZL | Performed by: FAMILY MEDICINE

## 2024-12-30 PROCEDURE — G0463 HOSPITAL OUTPT CLINIC VISIT: HCPCS

## 2024-12-30 PROCEDURE — 36415 COLL VENOUS BLD VENIPUNCTURE: CPT | Mod: ZL | Performed by: FAMILY MEDICINE

## 2024-12-30 PROCEDURE — 85004 AUTOMATED DIFF WBC COUNT: CPT | Mod: ZL | Performed by: FAMILY MEDICINE

## 2024-12-30 ASSESSMENT — PAIN SCALES - GENERAL: PAINLEVEL_OUTOF10: NO PAIN (0)

## 2025-03-31 ENCOUNTER — HOSPITAL ENCOUNTER (EMERGENCY)
Facility: HOSPITAL | Age: 70
Discharge: HOME | End: 2025-03-31
Attending: NURSE PRACTITIONER | Admitting: NURSE PRACTITIONER

## 2025-03-31 VITALS
SYSTOLIC BLOOD PRESSURE: 116 MMHG | RESPIRATION RATE: 22 BRPM | HEART RATE: 88 BPM | DIASTOLIC BLOOD PRESSURE: 77 MMHG | TEMPERATURE: 98.1 F | OXYGEN SATURATION: 95 %

## 2025-03-31 DIAGNOSIS — T15.92XA FOREIGN BODY OF LEFT EYE, INITIAL ENCOUNTER: ICD-10-CM

## 2025-03-31 PROCEDURE — 65205 REMOVE FOREIGN BODY FROM EYE: CPT | Performed by: NURSE PRACTITIONER

## 2025-03-31 PROCEDURE — 65205 REMOVE FOREIGN BODY FROM EYE: CPT

## 2025-03-31 PROCEDURE — 999N000104 HC STATISTIC NO CHARGE

## 2025-03-31 PROCEDURE — 250N000009 HC RX 250: Performed by: NURSE PRACTITIONER

## 2025-03-31 RX ORDER — TETRACAINE HYDROCHLORIDE 5 MG/ML
2 SOLUTION OPHTHALMIC ONCE
Status: COMPLETED | OUTPATIENT
Start: 2025-03-31 | End: 2025-03-31

## 2025-03-31 RX ORDER — ERYTHROMYCIN 5 MG/G
0.5 OINTMENT OPHTHALMIC 4 TIMES DAILY
Qty: 3.5 G | Refills: 0 | Status: SHIPPED | OUTPATIENT
Start: 2025-03-31 | End: 2025-04-05

## 2025-03-31 RX ADMIN — FLUORESCEIN SODIUM 1 STRIP: 1 STRIP OPHTHALMIC at 11:15

## 2025-03-31 RX ADMIN — TETRACAINE HYDROCHLORIDE 2 DROP: 5 SOLUTION OPHTHALMIC at 11:15

## 2025-03-31 ASSESSMENT — ENCOUNTER SYMPTOMS
NEUROLOGICAL NEGATIVE: 1
MUSCULOSKELETAL NEGATIVE: 1
RESPIRATORY NEGATIVE: 1
PSYCHIATRIC NEGATIVE: 1
HEMATOLOGIC/LYMPHATIC NEGATIVE: 1
ALLERGIC/IMMUNOLOGIC NEGATIVE: 1
EYE PAIN: 1
GASTROINTESTINAL NEGATIVE: 1
ENDOCRINE NEGATIVE: 1
CARDIOVASCULAR NEGATIVE: 1
CONSTITUTIONAL NEGATIVE: 1

## 2025-03-31 ASSESSMENT — ACTIVITIES OF DAILY LIVING (ADL)
ADLS_ACUITY_SCORE: 41
ADLS_ACUITY_SCORE: 41

## 2025-03-31 NOTE — ED PROVIDER NOTES
History     Chief Complaint   Patient presents with    Eye Injury     HPI  Samy Lu is a 69 year old individual comes in for complaints of possible foreign body in left eye.  Patient states was using compressed air to blow things on 3/28/2025 and believes he got something in his left eye.  Has had irritation.  Has had continued improvement with this sensation but patient is concerned so comes into the ER.  Patient is blind in the left eye so no visual change.    Allergies:  No Known Allergies    Problem List:    Patient Active Problem List    Diagnosis Date Noted    ACP (advance care planning) 2017     Priority: Medium     Advance Care Planning 3/1/2017: ACP Review of Chart / Resources Provided:  Reviewed chart for advance care plan.  Samy Lu has been provided information and resources to begin or update their advance care plan.  Added by Lo Ortega                Past Medical History:    No past medical history on file.    Past Surgical History:    Past Surgical History:   Procedure Laterality Date    ABDOMEN SURGERY      right IHR    ABDOMEN SURGERY      2 hernia repairs    ABDOMEN SURGERY      Umbilical hernia repair    COLONOSCOPY  10/21/2009    Due     COLONOSCOPY N/A 2018    Procedure: COLONOSCOPY With POLYPECTOMY,RESOLUTION CLIP, BIOPSY;  Surgeon: Jesus Shi MD;  Location: [facility]       Family History:    Family History   Problem Relation Age of Onset    Cancer Father         Non Hodgkins Lymphoma    Diabetes Paternal Grandmother        Social History:  Marital Status:   [2]  Social History     Tobacco Use    Smoking status: Former     Current packs/day: 0.00     Average packs/day: 0.3 packs/day for 53.0 years (13.3 ttl pk-yrs)     Types: Cigarettes     Start date: 1969     Quit date: 2006     Years since quittin.2    Smokeless tobacco: Never   Substance Use Topics    Alcohol use: Yes     Comment: rare    Drug use: Yes     Types:  "Marijuana     Comment: \"for glaucoma\"        Medications:    erythromycin (ROMYCIN) 5 MG/GM ophthalmic ointment  amoxicillin-clavulanate (AUGMENTIN) 875-125 MG tablet  timolol maleate (TIMOPTIC) 0.5 % ophthalmic solution          Review of Systems   Constitutional: Negative.    HENT: Negative.     Eyes:  Positive for pain and visual disturbance (Chronic left eye blindness.).   Respiratory: Negative.     Cardiovascular: Negative.    Gastrointestinal: Negative.    Endocrine: Negative.    Genitourinary: Negative.    Musculoskeletal: Negative.    Skin: Negative.    Allergic/Immunologic: Negative.    Neurological: Negative.    Hematological: Negative.    Psychiatric/Behavioral: Negative.         Physical Exam   BP: 116/77  Pulse: 88  Temp: 98.1  F (36.7  C)  Resp: 22  SpO2: 95 %      GENERAL APPEARANCE:  The patient is a 69 year old well-developed, well-nourished individual that appears as stated age.  EYES:  Pupils are equal, round, reactive to light with extraocular movements intact.  Patient has history of blindness in the left eye which is chronic.  Eye brows, lids, and lashes intact.  Conjunctiva to left eye is injected.  Slight whitish discharge is present.  With irrigation foreign body of a black speck was removed.  No corneal abrasion observed with fluorescein staining.   PSYCHIATRIC:  The patient is awake, alert, and oriented x4.  Recent and remote memory is intact.  Appropriate mood and affect.  Calm and cooperative with history and physical exam.  SKIN:  Warm, dry, and well perfused.  Good turgor.  No lesions, nodules, or rashes are noted.  No bruising noted.      ED Course     ED Course as of 03/31/25 1149   Mon Mar 31, 2025   1110 In to see patient and history/physical completed.    1138 Speck of a foreign body removed.  No obvious corneal abrasion.  Does have conjunctival irritation.  Prophylactically will place on erythromycin eye ointment.  Ophthalmology follow-up.     [facility]    Foreign " Body Removal - Ocular    Date/Time: 3/31/2025 11:35 AM    Performed by: Derek Willoughby APRN CNP  Authorized by: Derek Willoughby APRN CNP    Risks, benefits and alternatives discussed.      LOCATION     Location:  L conjunctival    Depth:  Superficial    PRE PROCEDURE DETAILS:     Imaging:  None    Fluorescein exam: yes      Fluorescein uptake: no      ANESTHESIA (see MAR for exact dosages):     Local anesthetic:  Tetracaine drops    PROCEDURE DETAILS     Localization method:  Slit lamp    Removal mechanism:  Irrigation    Foreign bodies recovered:  1    Description:  Black speck    Intact foreign body removal: yes      Residual rust ring observed: no      POST PROCEDURE DETAILS     Dressing:  Antibiotic ointment      PROCEDURE    Patient Tolerance:  Patient tolerated the procedure well with no immediate complications                No results found for this or any previous visit (from the past 24 hours).    Medications   tetracaine (PONTOCAINE) 0.5 % ophthalmic solution 2 drop (2 drops Right Eye $Given 3/31/25 1115)   fluorescein (FUL-LATOYA) ophthalmic strip 1 strip (1 strip Left Eye $Given 3/31/25 1115)       Assessments & Plan (with Medical Decision Making)     I have reviewed the nursing notes.    I have reviewed the findings, diagnosis, plan and need for follow up with the patient.    Summary:  Patient presents to the ER today for foreign body left eye.  Potential diagnosis which have been considered and evaluated include foreign body, corneal abrasion, conjunctivitis, as well as others. Many of these have been excluded using the various modalities and assessment as noted on the chart. At the present time, the diagnosis given seems to be the most likely superficial foreign body left eye.  Upon arrival, vitals signs are normal.  The patient is alert and oriented.  Has had foreign body in the left eye or sensation of since 3/28/2025.  Has had improvement of symptoms over the weekend but continues to have slight  sensation so comes in.  Patient is blind in the left eye so visual acuity not obtained.  Examination does show some white-colored discharge from the eye.  Does have conjunctival injection.  No obvious foreign body with slit lamp examination, no corneal abrasion with fluorescein staining, but after irrigation did have black speck, out of the eye.  Patient states that this sensation is improved after that.  Will discharge patient home prophylactically with erythromycin eye ointment 4 times a day x 5 days due to conjunctival injection.  Advised patient to follow-up with ophthalmology for reevaluation.  Rewetting drops for comfort.  Cool compresses for comfort.  Return to ER for new or worsening symptoms.  Patient verbalized understanding [facility] plan of care.  Patient discharged home.      Critical Care Time: None    Impression and plan discussed with patient. Questions answered, concerns addressed, indications for urgent re-evaluation reviewed, and  given. Patient/Parent/Caregiver agree with treatment plan and have no further questions at this time.  AVS provided at discharge.    This document was prepared using a combination of typing and voice generated software.  While every attempt was made for accuracy, spelling and grammatical errors may exist.              New Prescriptions    ERYTHROMYCIN (ROMYCIN) 5 MG/GM OPHTHALMIC OINTMENT    Place 0.5 inches Into the left eye 4 times daily for 5 days.       Final diagnoses:   Foreign body of left eye, initial encounter       3/31/2025   [facility]       Derek Willoughby APRN CNP  03/31/25 1149

## 2025-03-31 NOTE — ED NOTES
Patient presents with c/o getting something in his left eye on Friday, which he has no sight in chronically. Reports discomfort and tearing. Denies any purulent drainage.

## 2025-03-31 NOTE — ED NOTES
"Patient presents w/ c/o metal sliver in his left eye.   He is blind in his left eye since the 80s.   He reports, \"I can feel it in my eyelid\".   "

## 2025-03-31 NOTE — DISCHARGE INSTRUCTIONS
Apply erythromycin eye ointment 4 times daily for the next 5 days.    Eye rewetting drops for comfort.  Cool compresses for comfort.     Follow-up with your primary care provider for reevaluation.  Contact your primary care provider if you have any questions or concerns.  Do not hesitate to return to the ER if any new or worsening symptoms.     Please read the attached instructions (if any).  They highlight more specific treatments and interventions for you at home.              Thank you for letting me participate in your care and wish you a fast and uneventful recovery,    Derek OSPINA, CNP    Do not hesitate to contact me with questions or concerns.  [email or web address]

## 2025-04-02 ENCOUNTER — HOSPITAL ENCOUNTER (EMERGENCY)
Facility: HOSPITAL | Age: 70
Discharge: HOME | End: 2025-04-02
Attending: STUDENT IN AN ORGANIZED HEALTH CARE EDUCATION/TRAINING PROGRAM
Payer: MEDICARE

## 2025-04-02 ENCOUNTER — APPOINTMENT (OUTPATIENT)
Dept: GENERAL RADIOLOGY | Facility: HOSPITAL | Age: 70
End: 2025-04-02
Attending: STUDENT IN AN ORGANIZED HEALTH CARE EDUCATION/TRAINING PROGRAM
Payer: MEDICARE

## 2025-04-02 ENCOUNTER — APPOINTMENT (OUTPATIENT)
Dept: CT IMAGING | Facility: HOSPITAL | Age: 70
End: 2025-04-02
Attending: STUDENT IN AN ORGANIZED HEALTH CARE EDUCATION/TRAINING PROGRAM
Payer: MEDICARE

## 2025-04-02 VITALS
RESPIRATION RATE: 16 BRPM | TEMPERATURE: 97.8 F | OXYGEN SATURATION: 95 % | DIASTOLIC BLOOD PRESSURE: 93 MMHG | HEART RATE: 73 BPM | SYSTOLIC BLOOD PRESSURE: 136 MMHG

## 2025-04-02 DIAGNOSIS — K57.92 ACUTE DIVERTICULITIS: ICD-10-CM

## 2025-04-02 DIAGNOSIS — K57.32 DIVERTICULITIS OF COLON: ICD-10-CM

## 2025-04-02 LAB
ALBUMIN SERPL BCG-MCNC: 4.4 G/DL (ref 3.5–5.2)
ALP SERPL-CCNC: 60 U/L (ref 40–150)
ALT SERPL W P-5'-P-CCNC: 29 U/L (ref 0–70)
ANION GAP SERPL CALCULATED.3IONS-SCNC: 13 MMOL/L (ref 7–15)
AST SERPL W P-5'-P-CCNC: 30 U/L (ref 0–45)
BASOPHILS # BLD AUTO: 0.1 10E3/UL (ref 0–0.2)
BASOPHILS NFR BLD AUTO: 0 %
BILIRUB SERPL-MCNC: 0.7 MG/DL
BUN SERPL-MCNC: 15.5 MG/DL (ref 8–23)
CALCIUM SERPL-MCNC: 9.3 MG/DL (ref 8.8–10.4)
CHLORIDE SERPL-SCNC: 100 MMOL/L (ref 98–107)
CREAT SERPL-MCNC: 1.08 MG/DL (ref 0.67–1.17)
EGFRCR SERPLBLD CKD-EPI 2021: 74 ML/MIN/1.73M2
EOSINOPHIL # BLD AUTO: 0 10E3/UL (ref 0–0.7)
EOSINOPHIL NFR BLD AUTO: 0 %
ERYTHROCYTE [DISTWIDTH] IN BLOOD BY AUTOMATED COUNT: 12.7 % (ref 10–15)
GLUCOSE SERPL-MCNC: 104 MG/DL (ref 70–99)
HCO3 SERPL-SCNC: 26 MMOL/L (ref 22–29)
HCT VFR BLD AUTO: 50.1 % (ref 40–53)
HGB BLD-MCNC: 18.1 G/DL (ref 13.3–17.7)
HOLD SPECIMEN: NORMAL
IMM GRANULOCYTES # BLD: 0.1 10E3/UL
IMM GRANULOCYTES NFR BLD: 1 %
LIPASE SERPL-CCNC: 113 U/L (ref 13–60)
LYMPHOCYTES # BLD AUTO: 0.9 10E3/UL (ref 0.8–5.3)
LYMPHOCYTES NFR BLD AUTO: 5 %
MCH RBC QN AUTO: 33.3 PG (ref 26.5–33)
MCHC RBC AUTO-ENTMCNC: 36.1 G/DL (ref 31.5–36.5)
MCV RBC AUTO: 92 FL (ref 78–100)
MONOCYTES # BLD AUTO: 1.1 10E3/UL (ref 0–1.3)
MONOCYTES NFR BLD AUTO: 6 %
NEUTROPHILS # BLD AUTO: 16.3 10E3/UL (ref 1.6–8.3)
NEUTROPHILS NFR BLD AUTO: 88 %
NRBC # BLD AUTO: 0 10E3/UL
NRBC BLD AUTO-RTO: 0 /100
PLATELET # BLD AUTO: 225 10E3/UL (ref 150–450)
POTASSIUM SERPL-SCNC: 4.7 MMOL/L (ref 3.4–5.3)
PROT SERPL-MCNC: 7.9 G/DL (ref 6.4–8.3)
RBC # BLD AUTO: 5.43 10E6/UL (ref 4.4–5.9)
SODIUM SERPL-SCNC: 139 MMOL/L (ref 135–145)
TROPONIN T SERPL HS-MCNC: <6 NG/L
WBC # BLD AUTO: 18.6 10E3/UL (ref 4–11)

## 2025-04-02 PROCEDURE — 99284 EMERGENCY DEPT VISIT MOD MDM: CPT | Performed by: STUDENT IN AN ORGANIZED HEALTH CARE EDUCATION/TRAINING PROGRAM

## 2025-04-02 PROCEDURE — 83690 ASSAY OF LIPASE: CPT | Performed by: STUDENT IN AN ORGANIZED HEALTH CARE EDUCATION/TRAINING PROGRAM

## 2025-04-02 PROCEDURE — 93005 ELECTROCARDIOGRAM TRACING: CPT

## 2025-04-02 PROCEDURE — 74177 CT ABD & PELVIS W/CONTRAST: CPT

## 2025-04-02 PROCEDURE — 99285 EMERGENCY DEPT VISIT HI MDM: CPT | Mod: 25

## 2025-04-02 PROCEDURE — 71046 X-RAY EXAM CHEST 2 VIEWS: CPT | Mod: 26 | Performed by: RADIOLOGY

## 2025-04-02 PROCEDURE — 74177 CT ABD & PELVIS W/CONTRAST: CPT | Mod: 26 | Performed by: STUDENT IN AN ORGANIZED HEALTH CARE EDUCATION/TRAINING PROGRAM

## 2025-04-02 PROCEDURE — 250N000013 HC RX MED GY IP 250 OP 250 PS 637: Performed by: STUDENT IN AN ORGANIZED HEALTH CARE EDUCATION/TRAINING PROGRAM

## 2025-04-02 PROCEDURE — 85004 AUTOMATED DIFF WBC COUNT: CPT | Performed by: STUDENT IN AN ORGANIZED HEALTH CARE EDUCATION/TRAINING PROGRAM

## 2025-04-02 PROCEDURE — 93010 ELECTROCARDIOGRAM REPORT: CPT | Performed by: INTERNAL MEDICINE

## 2025-04-02 PROCEDURE — 84484 ASSAY OF TROPONIN QUANT: CPT | Performed by: STUDENT IN AN ORGANIZED HEALTH CARE EDUCATION/TRAINING PROGRAM

## 2025-04-02 PROCEDURE — 80053 COMPREHEN METABOLIC PANEL: CPT | Performed by: STUDENT IN AN ORGANIZED HEALTH CARE EDUCATION/TRAINING PROGRAM

## 2025-04-02 PROCEDURE — 36415 COLL VENOUS BLD VENIPUNCTURE: CPT | Performed by: STUDENT IN AN ORGANIZED HEALTH CARE EDUCATION/TRAINING PROGRAM

## 2025-04-02 PROCEDURE — 250N000009 HC RX 250: Performed by: STUDENT IN AN ORGANIZED HEALTH CARE EDUCATION/TRAINING PROGRAM

## 2025-04-02 PROCEDURE — 250N000011 HC RX IP 250 OP 636: Performed by: RADIOLOGY

## 2025-04-02 PROCEDURE — 71046 X-RAY EXAM CHEST 2 VIEWS: CPT

## 2025-04-02 RX ORDER — MAGNESIUM HYDROXIDE/ALUMINUM HYDROXICE/SIMETHICONE 120; 1200; 1200 MG/30ML; MG/30ML; MG/30ML
15 SUSPENSION ORAL ONCE
Status: COMPLETED | OUTPATIENT
Start: 2025-04-02 | End: 2025-04-02

## 2025-04-02 RX ORDER — IOPAMIDOL 755 MG/ML
91 INJECTION, SOLUTION INTRAVASCULAR ONCE
Status: COMPLETED | OUTPATIENT
Start: 2025-04-02 | End: 2025-04-02

## 2025-04-02 RX ORDER — LIDOCAINE HYDROCHLORIDE 20 MG/ML
10 SOLUTION OROPHARYNGEAL ONCE
Status: COMPLETED | OUTPATIENT
Start: 2025-04-02 | End: 2025-04-02

## 2025-04-02 RX ADMIN — LIDOCAINE HYDROCHLORIDE 10 ML: 20 SOLUTION ORAL at 10:30

## 2025-04-02 RX ADMIN — IOPAMIDOL 91 ML: 755 INJECTION, SOLUTION INTRAVENOUS at 11:12

## 2025-04-02 RX ADMIN — ALUMINUM HYDROXIDE, MAGNESIUM HYDROXIDE, AND SIMETHICONE 15 ML: 200; 200; 20 SUSPENSION ORAL at 10:29

## 2025-04-02 ASSESSMENT — ACTIVITIES OF DAILY LIVING (ADL)
ADLS_ACUITY_SCORE: 41

## 2025-04-02 ASSESSMENT — COLUMBIA-SUICIDE SEVERITY RATING SCALE - C-SSRS
6. HAVE YOU EVER DONE ANYTHING, STARTED TO DO ANYTHING, OR PREPARED TO DO ANYTHING TO END YOUR LIFE?: NO
2. HAVE YOU ACTUALLY HAD ANY THOUGHTS OF KILLING YOURSELF IN THE PAST MONTH?: NO
1. IN THE PAST MONTH, HAVE YOU WISHED YOU WERE DEAD OR WISHED YOU COULD GO TO SLEEP AND NOT WAKE UP?: NO

## 2025-04-02 NOTE — DISCHARGE INSTRUCTIONS
Return to the emergency department for worsening symptoms or new concerning symptoms.  Follow-up with primary care provider in the next week.  Call schedule appointment.  Take the antibiotics as directed

## 2025-04-02 NOTE — ED TRIAGE NOTES
Patient has abdominal pian, different from a week ago when he fell- he had abdominal pain, slight chest pain and left arm pain. Today he denies chest and arm pain. Determined to not be UC appropriate

## 2025-04-02 NOTE — ED PROVIDER NOTES
[facility]  ED Provider Note    Chief Complaint   Patient presents with    Abdominal Pain    Chest Pain     History:  Samy Lu is a 69 year old male with no relevant past medical history presents to the emergency department today complaining of abdominal pain.  Location of abdominal pain is mostly epigastric.  Mild nausea no vomiting no fevers no blood in stool last BM was today and otherwise normal.  No other     Review of Systems   Performed; see HPI for pertinent positives and negatives.     Medical history, surgical history, and social history was reviewed.  Nursing documentation, triage note, and vitals were reviewed.    Vitals:  BP: (!) 143/90  Pulse: 77  Temp: 98.1  F (36.7  C)  Resp: 16  SpO2: 96 %    Physical Exam:  Constitutional: Alert and conversant. NAD   HENT: NCAT   Eyes: Normal pupils   Neck: supple   CV: No pallor  Pulmonary/Chest: Non-labored respirations  Abdominal: non-distended soft, tender in the epigastrium and the suprapubic area  MSK: JEROME.   Neuro: Alert and appropriate   Skin: Warm and dry. No diaphoresis. No rashes on exposed skin    Psych: Appropriate mood and affect       MDM:      ED Course as of 04/02/25 1226   Wed Apr 02, 2025   0959 Pt notes abdominal pain Differential includes but is not limited to appendicitis, cholecystitis, pancreatitis, nephrolithiasis, gastroesophageal reflux disease, gastritis, pyelonephritis, urinary tract infection, testicular torsion, mesenteric ischemia, strangulated hernia, aortic aneurysm.   PT notes some previous chest pain. Differential includes but is not limited to acute coronary syndrome, pulmonary embolism, pneumonia, aortic dissection, pneumothorax, GERD, pericarditis, myocarditis, MSK/costochondritis, shingles.      1225 Based on the location of pain CT is warranted   1225 CT Abdomen Pelvis w Contrast  Suspicious for colitis versus diverticulitis.  There is no diarrhea, he has a history of diverticulitis.,  Suspect  clinically this is diverticulitis we will proceed with treatment for it.  Patient states that in the past he has had Augmentin and he would like to repeat that.  Improvement of epigastric pain with GI cocktail.  Suspect some amount of indigestion in the setting of his diverticulitis.  Patient amenable with plan for antibiotics discharge stable additional question answered and return precautions given       Procedures:  Procedures        Impression:  Final diagnoses:   Acute diverticulitis            Nas Rivera MD  04/02/25 1149

## 2025-04-02 NOTE — ED NOTES
Discharge instructions reviewed. Verbalized understanding. Denies questions or concerns. Ambulated out of ER independently with steady gait.

## 2025-04-03 LAB
ATRIAL RATE - MUSE: 81 BPM
DIASTOLIC BLOOD PRESSURE - MUSE: NORMAL MMHG
INTERPRETATION ECG - MUSE: NORMAL
P AXIS - MUSE: 45 DEGREES
PR INTERVAL - MUSE: 130 MS
QRS DURATION - MUSE: 76 MS
QT - MUSE: 364 MS
QTC - MUSE: 422 MS
R AXIS - MUSE: 37 DEGREES
SYSTOLIC BLOOD PRESSURE - MUSE: NORMAL MMHG
T AXIS - MUSE: 46 DEGREES
VENTRICULAR RATE- MUSE: 81 BPM

## 2025-04-08 ENCOUNTER — OFFICE VISIT (OUTPATIENT)
Dept: FAMILY MEDICINE | Facility: OTHER | Age: 70
End: 2025-04-08
Attending: CHIROPRACTOR

## 2025-04-08 VITALS
WEIGHT: 182.9 LBS | OXYGEN SATURATION: 95 % | HEART RATE: 74 BPM | DIASTOLIC BLOOD PRESSURE: 74 MMHG | SYSTOLIC BLOOD PRESSURE: 106 MMHG | BODY MASS INDEX: 27.81 KG/M2 | TEMPERATURE: 97.5 F

## 2025-04-08 DIAGNOSIS — S05.8X2A EYE ABRASION, LEFT, INITIAL ENCOUNTER: ICD-10-CM
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
## 2025-04-08 PROCEDURE — G0463 HOSPITAL OUTPT CLINIC VISIT: HCPCS

## 2025-04-08 PROCEDURE — 99213 OFFICE O/P EST LOW 20 MIN: CPT | Performed by: CHIROPRACTOR

## 2025-04-08 ASSESSMENT — PAIN SCALES - GENERAL: PAINLEVEL_OUTOF10: NO PAIN (0)

## 2025-04-08 NOTE — PROGRESS NOTES
"CHIEF COMPLAINT:   Chief Complaint   Patient presents with    Work Comp       HISTORY OF PRESENTING INJURY     Rocky is a pleasant 69 year old male here for follow-up left eye abrasion.  He had a small piece of steel removed by ED.  No eye irritation since.  He is blind in the left eye. Has applied medication as instructed.  No concerns today.        PAST MEDICAL HISTORY:  No past medical history on file.    PAST SURGICAL HISTORY:  Past Surgical History:   Procedure Laterality Date    ABDOMEN SURGERY      right IHR    ABDOMEN SURGERY      2 hernia repairs    ABDOMEN SURGERY      Umbilical hernia repair    COLONOSCOPY  10/21/2009    Due     COLONOSCOPY N/A 2018    Procedure: COLONOSCOPY With POLYPECTOMY,RESOLUTION CLIP, BIOPSY;  Surgeon: Jesus Shi MD;  Location: [facility] OR       ALLERGIES:  No Known Allergies    CURRENT MEDICATIONS:  Current Outpatient Medications   Medication Sig Dispense Refill    timolol maleate (TIMOPTIC) 0.5 % ophthalmic solution Place 1 drop Into the left eye daily      amoxicillin-clavulanate (AUGMENTIN) 875-125 MG tablet Take 1 tablet by mouth 2 times daily for 7 days. 14 tablet 0       SOCIAL HISTORY:  Social History     Socioeconomic History    Marital status:      Spouse name: Not on file    Number of children: Not on file    Years of education: Not on file    Highest education level: Not on file   Occupational History    Not on file   Tobacco Use    Smoking status: Former     Current packs/day: 0.00     Average packs/day: 0.3 packs/day for 53.0 years (13.3 ttl pk-yrs)     Types: Cigarettes     Start date: 1969     Quit date: 2006     Years since quittin.2    Smokeless tobacco: Never   Substance and Sexual Activity    Alcohol use: Yes     Comment: rare    Drug use: Yes     Types: Marijuana     Comment: \"for glaucoma\"    Sexual activity: Not on file   Other Topics Concern    Parent/sibling w/ CABG, MI or angioplasty before 65F 55M? No   Social History " Narrative    Not on file     Social Drivers of Health     Financial Resource Strain: Low Risk  (3/26/2024)    Financial Resource Strain     Within the past 12 months, have you or your family members you live with been unable to get utilities (heat, electricity) when it was really needed?: No   Food Insecurity: Low Risk  (3/26/2024)    Food Insecurity     Within the past 12 months, did you worry that your food would run out before you got money to buy more?: No     Within the past 12 months, did the food you bought just not last and you didn t have money to get more?: No   Transportation Needs: Low Risk  (3/26/2024)    Transportation Needs     Within the past 12 months, has lack of transportation kept you from medical appointments, getting your medicines, non-medical meetings or appointments, work, or from getting things that you need?: No   Physical Activity: Inactive (3/26/2024)    Exercise Vital Sign     Days of Exercise per Week: 0 days     Minutes of Exercise per Session: 0 min   Stress: No Stress Concern Present (3/26/2024)    [employer] - Occupational Stress Questionnaire     Feeling of Stress : Not at all   Social Connections: Unknown (3/26/2024)    Social Connection and Isolation Panel [NHANES]     Frequency of Communication with Friends and Family: Not on file     Frequency of Social Gatherings with Friends and Family: Three times a week     Attends Alevism Services: Not on file     Active Member of Clubs or Organizations: Not on file     Attends Club or Organization Meetings: Not on file     Marital Status: Not on file   Interpersonal Safety: Low Risk  (12/30/2024)    Interpersonal Safety     Do you feel physically and emotionally safe where you currently live?: Yes     Within the past 12 months, have you been hit, slapped, kicked or otherwise physically hurt by someone?: No     Within the past 12 months, have you been humiliated or emotionally abused in other ways by your  partner or ex-partner?: No   Housing Stability: Low Risk  (3/26/2024)    Housing Stability     Do you have housing? : Yes     Are you worried about losing your housing?: No       FAMILY HISTORY:  Family History   Problem Relation Age of Onset    Cancer Father         Non Hodgkins Lymphoma    Diabetes Paternal Grandmother        REVIEW OF SYSTEMS:    Unremarkable other than chief complaint      PHYSICAL EXAM:   /74 (BP Location: Right arm, Patient Position: Sitting, Cuff Size: Adult Regular)   Pulse 74   Temp 97.5  F (36.4  C) (Tympanic)   Wt 83 kg (182 lb 14.4 oz)   SpO2 95%   BMI 27.81 kg/m   Body mass index is 27.81 kg/m .    General Appearance: No acute distress. Normal left eye evaluation.  No red reflex from past vision loss.  Full ocular movement, no irritation.  No evidence of foreign object      IMPRESSION/PLAN:    Normal exam today.  Due to involvement of left eye, will hold chart open for two additional weeks and close his case should he not have any concerns by 4/22/2025.  He demonstrated full understanding and was given two copies of the new workability that also indicated this.     Total time spent today in chart review/preparation, face to face evaluation, and documentation: 28 minutes.      Lizandro Manrique DC, MELIZA, CICE  Director - Occupational Medicine Department  Diplomate of the American Board of Forensic Professionals  Board Certified - American Board of Independent Medical Examiners      9:40 AM 4/8/2025

## 2025-04-15 SDOH — HEALTH STABILITY: PHYSICAL HEALTH: ON AVERAGE, HOW MANY DAYS PER WEEK DO YOU ENGAGE IN MODERATE TO STRENUOUS EXERCISE (LIKE A BRISK WALK)?: 5 DAYS

## 2025-04-15 SDOH — HEALTH STABILITY: PHYSICAL HEALTH: ON AVERAGE, HOW MANY MINUTES DO YOU ENGAGE IN EXERCISE AT THIS LEVEL?: 10 MIN

## 2025-04-15 ASSESSMENT — SOCIAL DETERMINANTS OF HEALTH (SDOH): HOW OFTEN DO YOU GET TOGETHER WITH FRIENDS OR RELATIVES?: PATIENT DECLINED

## 2025-04-17 ENCOUNTER — OFFICE VISIT (OUTPATIENT)
Dept: FAMILY MEDICINE | Facility: OTHER | Age: 70
End: 2025-04-17
Attending: FAMILY MEDICINE
Payer: MEDICARE

## 2025-04-17 VITALS
BODY MASS INDEX: 27.74 KG/M2 | TEMPERATURE: 98.3 F | HEART RATE: 85 BPM | HEIGHT: 68 IN | WEIGHT: 183 LBS | DIASTOLIC BLOOD PRESSURE: 68 MMHG | SYSTOLIC BLOOD PRESSURE: 120 MMHG | OXYGEN SATURATION: 95 %

## 2025-04-17 DIAGNOSIS — K57.32 DIVERTICULITIS OF COLON: ICD-10-CM

## 2025-04-17 DIAGNOSIS — Z12.5 SCREENING FOR PROSTATE CANCER: ICD-10-CM

## 2025-04-17 DIAGNOSIS — Z13.220 LIPID SCREENING: Primary | ICD-10-CM

## 2025-04-17 DIAGNOSIS — Z00.00 MEDICARE ANNUAL WELLNESS VISIT, SUBSEQUENT: ICD-10-CM

## 2025-04-17 LAB
ALBUMIN SERPL BCG-MCNC: 4.2 G/DL (ref 3.5–5.2)
ALP SERPL-CCNC: 58 U/L (ref 40–150)
ALT SERPL W P-5'-P-CCNC: 19 U/L (ref 0–70)
ANION GAP SERPL CALCULATED.3IONS-SCNC: 11 MMOL/L (ref 7–15)
AST SERPL W P-5'-P-CCNC: 20 U/L (ref 0–45)
BILIRUB SERPL-MCNC: 0.5 MG/DL
BUN SERPL-MCNC: 13.5 MG/DL (ref 8–23)
CALCIUM SERPL-MCNC: 9.1 MG/DL (ref 8.8–10.4)
CHLORIDE SERPL-SCNC: 98 MMOL/L (ref 98–107)
CHOLEST SERPL-MCNC: 135 MG/DL
CREAT SERPL-MCNC: 1.04 MG/DL (ref 0.67–1.17)
EGFRCR SERPLBLD CKD-EPI 2021: 78 ML/MIN/1.73M2
FASTING STATUS PATIENT QL REPORTED: NO
FASTING STATUS PATIENT QL REPORTED: NO
GLUCOSE SERPL-MCNC: 115 MG/DL (ref 70–99)
HCO3 SERPL-SCNC: 28 MMOL/L (ref 22–29)
HDLC SERPL-MCNC: 46 MG/DL
LDLC SERPL CALC-MCNC: 58 MG/DL
NONHDLC SERPL-MCNC: 89 MG/DL
POTASSIUM SERPL-SCNC: 3.9 MMOL/L (ref 3.4–5.3)
PROT SERPL-MCNC: 7.3 G/DL (ref 6.4–8.3)
PSA SERPL DL<=0.01 NG/ML-MCNC: 1.62 NG/ML (ref 0–4.5)
SODIUM SERPL-SCNC: 137 MMOL/L (ref 135–145)
TRIGL SERPL-MCNC: 153 MG/DL

## 2025-04-17 PROCEDURE — 83718 ASSAY OF LIPOPROTEIN: CPT | Mod: ZL | Performed by: FAMILY MEDICINE

## 2025-04-17 PROCEDURE — 82040 ASSAY OF SERUM ALBUMIN: CPT | Mod: ZL | Performed by: FAMILY MEDICINE

## 2025-04-17 PROCEDURE — G0103 PSA SCREENING: HCPCS | Mod: ZL | Performed by: FAMILY MEDICINE

## 2025-04-17 PROCEDURE — 36415 COLL VENOUS BLD VENIPUNCTURE: CPT | Mod: ZL | Performed by: FAMILY MEDICINE

## 2025-04-17 PROCEDURE — 82465 ASSAY BLD/SERUM CHOLESTEROL: CPT | Mod: ZL | Performed by: FAMILY MEDICINE

## 2025-04-17 PROCEDURE — G0463 HOSPITAL OUTPT CLINIC VISIT: HCPCS

## 2025-04-17 ASSESSMENT — PAIN SCALES - GENERAL: PAINLEVEL_OUTOF10: NO PAIN (0)

## 2025-04-17 NOTE — PROGRESS NOTES
"Preventive Care Visit  [facility]  Reji Flynn MD, Family Medicine  Apr 17, 2025      Assessment & Plan     Lipid screening  Update and follow.    - Comprehensive metabolic panel; Future  - Lipid Profile; Future  - Comprehensive metabolic panel  - Lipid Profile    Screening for prostate cancer  Update psa and follow.    - Prostate Specific Antigen Screen; Future  - Prostate Specific Antigen Screen    Diverticulitis of colon  Resolved.  He has the augmentin on hand.      Medicare annual wellness visit, subsequent  Doing well.  Update screens and labs.  Nice review.  No other action today.              BMI  Estimated body mass index is 27.83 kg/m  as calculated from the following:    Height as of this encounter: 1.727 m (5' 8\").    Weight as of this encounter: 83 kg (183 lb).       Counseling  Appropriate preventive services were addressed with this patient via screening, questionnaire, or discussion as appropriate for fall prevention, nutrition, physical activity, Tobacco-use cessation, social engagement, weight loss and cognition.  Checklist reviewing preventive services available has been given to the patient.  Reviewed patient's diet, addressing concerns and/or questions.   The patient was instructed to see the dentist every 6 months.   The patient was provided with written information regarding signs of hearing loss.   Information on urinary incontinence and treatment options given to patient.           Suman Hidalgo is a 69 year old, presenting for the following:  Wellness Visit        4/17/2025    11:11 AM   Additional Questions   Roomed by Aisha PORTER         Advance Care Planning    Health Care Directive received at today's visit.  Forwarded to [employer].        4/15/2025   General Health   How would you rate your overall physical health? Good   Feel stress (tense, anxious, or unable to sleep) Only a little   (!) STRESS CONCERN      4/15/2025   Nutrition   Diet: Regular (no " restrictions)         4/15/2025   Exercise   Days per week of moderate/strenous exercise 5 days   Average minutes spent exercising at this level 10 min         4/15/2025   Social Factors   Frequency of gathering with friends or relatives Patient declined   Worry food won't last until get money to buy more No   Food not last or not have enough money for food? No   Do you have housing? (Housing is defined as stable permanent housing and does not include staying ouside in a car, in a tent, in an abandoned building, in an overnight shelter, or couch-surfing.) Yes   Are you worried about losing your housing? No   Lack of transportation? No   Unable to get utilities (heat,electricity)? No         4/15/2025   Fall Risk   Fallen 2 or more times in the past year? No   Trouble with walking or balance? No          4/15/2025   Activities of Daily Living- Home Safety   Needs help with the following daily activites None of the above   Safety concerns in the home None of the above         4/15/2025   Dental   Dentist two times every year? (!) NO         4/15/2025   Hearing Screening   Hearing concerns? (!) I NEED TO ASK PEOPLE TO SPEAK UP OR REPEAT THEMSELVES.    (!) IT'S HARD TO FOLLOW A CONVERSATION IN A NOISY RESTAURANT OR CROWDED ROOM.    (!) TROUBLE UNDERSTANDING SOFT OR WHISPERED SPEECH.       Multiple values from one day are sorted in reverse-chronological order         4/15/2025   Driving Risk Screening   Patient/family members have concerns about driving No         4/15/2025   General Alertness/Fatigue Screening   Have you been more tired than usual lately? No         4/15/2025   Urinary Incontinence Screening   Bothered by leaking urine in past 6 months Yes         Today's PHQ-2 Score:       4/17/2025    10:50 AM   PHQ-2 ( 1999 [employer])   Q1: Little interest or pleasure in doing things 0    Q2: Feeling down, depressed or hopeless 0    PHQ-2 Score 0    Q1: Little interest or pleasure in doing things Not at all   Q2:  "Feeling down, depressed or hopeless Not at all   PHQ-2 Score 0       Proxy-reported           4/15/2025   Substance Use   Alcohol more than 3/day or more than 7/wk No   Do you have a current opioid prescription? No   How severe/bad is pain from 1 to 10? 2/10   Do you use any other substances recreationally? (!) CANNABIS PRODUCTS     Social History     Tobacco Use    Smoking status: Former     Current packs/day: 0.00     Average packs/day: 0.3 packs/day for 37.0 years (9.3 ttl pk-yrs)     Types: Cigarettes     Start date: 1969     Quit date: 2006     Years since quittin.3    Smokeless tobacco: Never   Substance Use Topics    Alcohol use: Yes     Comment: rare    Drug use: Yes     Types: Marijuana     Comment: \"for glaucoma\"       Last PSA:   PSA   Date Value Ref Range Status   2021 0.93 0 - 4 ug/L Final     Comment:     Assay Method:  Chemiluminescence using Siemens Vista analyzer     Prostate Specific Antigen Screen   Date Value Ref Range Status   2024 1.24 0.00 - 4.50 ng/mL Final   2022 1.33 0.00 - 4.00 ug/L Final     ASCVD Risk   The 10-year ASCVD risk score (Cherelle DK, et al., 2019) is: 13%    Values used to calculate the score:      Age: 69 years      Sex: Male      Is Non- : No      Diabetic: No      Tobacco smoker: No      Systolic Blood Pressure: 120 mmHg      Is BP treated: No      HDL Cholesterol: 49 mg/dL      Total Cholesterol: 146 mg/dL            Reviewed and updated as needed this visit by Provider                    No past medical history on file.  Past Surgical History:   Procedure Laterality Date    ABDOMEN SURGERY      right IHR    ABDOMEN SURGERY      2 hernia repairs    ABDOMEN SURGERY      Umbilical hernia repair    COLONOSCOPY  10/21/2009    Due     COLONOSCOPY N/A 2018    Procedure: COLONOSCOPY With POLYPECTOMY,RESOLUTION CLIP, BIOPSY;  Surgeon: Jesus Shi MD;  Location: [facility]     Current providers sharing in " "care for this patient include:  Patient Care Team:  Reji Flynn MD as PCP - General (Family Practice)  Reji Flynn MD as Assigned PCP    The following health maintenance items are reviewed in Epic and correct as of today:  Health Maintenance   Topic Date Due    DTAP/TDAP/TD IMMUNIZATION (1 - Tdap) Never done    Pneumococcal Vaccine: 50+ Years (1 of 1 - PCV) Never done    ZOSTER IMMUNIZATION (1 of 2) Never done    INFLUENZA VACCINE (1) Never done    COVID-19 Vaccine (5 - 2024-25 season) 09/01/2024    LIPID  03/28/2025    MEDICARE ANNUAL WELLNESS VISIT  03/27/2025    FALL RISK ASSESSMENT  04/17/2026    DIABETES SCREENING  04/02/2028    COLORECTAL CANCER SCREENING  11/19/2028    ADVANCE CARE PLANNING  03/27/2029    RSV VACCINE (1 - 1-dose 75+ series) 05/24/2030    HEPATITIS C SCREENING  Completed    PHQ-2 (once per calendar year)  Completed    AORTIC ANEURYSM SCREENING (SYSTEM ASSIGNED)  Completed    HPV IMMUNIZATION  Aged Out    MENINGITIS IMMUNIZATION  Aged Out         Review of Systems  CONSTITUTIONAL: NEGATIVE for fever, chills, change in weight  INTEGUMENTARY/SKIN: NEGATIVE for worrisome rashes, moles or lesions  EYES: NEGATIVE for vision changes or irritation  ENT/MOUTH: NEGATIVE for ear, mouth and throat problems  RESP: NEGATIVE for significant cough or SOB  BREAST: NEGATIVE for masses, tenderness or discharge  CV: NEGATIVE for chest pain, palpitations or peripheral edema  GI: NEGATIVE for nausea, abdominal pain, heartburn, or change in bowel habits  : NEGATIVE for frequency, dysuria, or hematuria  MUSCULOSKELETAL: NEGATIVE for significant arthralgias or myalgia  NEURO: NEGATIVE for weakness, dizziness or paresthesias  ENDOCRINE: NEGATIVE for temperature intolerance, skin/hair changes  HEME: NEGATIVE for bleeding problems  PSYCHIATRIC: NEGATIVE for changes in mood or affect     Objective    Exam  /68   Pulse 85   Temp 98.3  F (36.8  C) (Tympanic)   Ht 1.727 m (5' 8\")   Wt 83 kg (183 lb) " "  SpO2 95%   BMI 27.83 kg/m     Estimated body mass index is 27.83 kg/m  as calculated from the following:    Height as of this encounter: 1.727 m (5' 8\").    Weight as of this encounter: 83 kg (183 lb).    Physical Exam  GENERAL: alert and no distress  EYES: Eyes grossly normal to inspection, PERRL and conjunctivae and sclerae normal  HENT: ear canals and TM's normal, nose and mouth without ulcers or lesions  NECK: no adenopathy, no asymmetry, masses, or scars  RESP: lungs clear to auscultation - no rales, rhonchi or wheezes  CV: regular rate and rhythm, normal S1 S2, no S3 or S4, no murmur, click or rub, no peripheral edema  ABDOMEN: soft, nontender, no hepatosplenomegaly, no masses and bowel sounds normal  MS: no gross musculoskeletal defects noted, no edema  SKIN: no suspicious lesions or rashes  NEURO: Normal strength and tone, mentation intact and speech normal  PSYCH: mentation appears normal, affect normal/bright    Labs pending.  Nonfasting,.      The longitudinal plan of care for the diagnosis(es)/condition(s) as documented were addressed during this visit. Due to the added complexity in care, I will continue to support Rocky in the subsequent management and with ongoing continuity of care.    Augmentin refilled to keep on hand and have and use if diverticulitis.  Rocky is doing great.          4/17/2025   Mini Cog   Clock Draw Score 2 Normal   3 Item Recall 3 objects recalled   Mini Cog Total Score 5              Signed Electronically by: Reji Flynn MD    "

## 2025-07-13 ENCOUNTER — APPOINTMENT (OUTPATIENT)
Dept: CT IMAGING | Facility: HOSPITAL | Age: 70
End: 2025-07-13
Attending: INTERNAL MEDICINE
Payer: MEDICARE

## 2025-07-13 ENCOUNTER — HOSPITAL ENCOUNTER (INPATIENT)
Facility: HOSPITAL | Age: 70
End: 2025-07-13
Attending: INTERNAL MEDICINE | Admitting: SURGERY
Payer: MEDICARE

## 2025-07-13 DIAGNOSIS — K57.20 DIVERTICULITIS OF COLON WITH PERFORATION: ICD-10-CM

## 2025-07-13 DIAGNOSIS — K63.1 PERFORATED BOWEL (H): Primary | ICD-10-CM

## 2025-07-13 LAB
ALBUMIN SERPL BCG-MCNC: 4 G/DL (ref 3.5–5.2)
ALBUMIN UR-MCNC: NEGATIVE MG/DL
ALP SERPL-CCNC: 51 U/L (ref 40–150)
ALT SERPL W P-5'-P-CCNC: 17 U/L (ref 0–70)
ANION GAP SERPL CALCULATED.3IONS-SCNC: 14 MMOL/L (ref 7–15)
APPEARANCE UR: CLEAR
AST SERPL W P-5'-P-CCNC: 19 U/L (ref 0–45)
ATRIAL RATE - MUSE: 77 BPM
BASOPHILS # BLD AUTO: 0.1 10E3/UL (ref 0–0.2)
BASOPHILS NFR BLD AUTO: 0 %
BILIRUB SERPL-MCNC: 0.9 MG/DL
BILIRUB UR QL STRIP: NEGATIVE
BUN SERPL-MCNC: 10.5 MG/DL (ref 8–23)
CALCIUM SERPL-MCNC: 8.9 MG/DL (ref 8.8–10.4)
CHLORIDE SERPL-SCNC: 102 MMOL/L (ref 98–107)
COLOR UR AUTO: ABNORMAL
CREAT SERPL-MCNC: 1.01 MG/DL (ref 0.67–1.17)
CRP SERPL-MCNC: 6.22 MG/L
DIASTOLIC BLOOD PRESSURE - MUSE: NORMAL MMHG
EGFRCR SERPLBLD CKD-EPI 2021: 80 ML/MIN/1.73M2
EOSINOPHIL # BLD AUTO: 0.2 10E3/UL (ref 0–0.7)
EOSINOPHIL NFR BLD AUTO: 1 %
ERYTHROCYTE [DISTWIDTH] IN BLOOD BY AUTOMATED COUNT: 12.9 % (ref 10–15)
GLUCOSE SERPL-MCNC: 135 MG/DL (ref 70–99)
GLUCOSE UR STRIP-MCNC: NEGATIVE MG/DL
HCO3 SERPL-SCNC: 21 MMOL/L (ref 22–29)
HCT VFR BLD AUTO: 49.8 % (ref 40–53)
HGB BLD-MCNC: 18.1 G/DL (ref 13.3–17.7)
HGB UR QL STRIP: NEGATIVE
HOLD SPECIMEN: NORMAL
IMM GRANULOCYTES # BLD: 0.1 10E3/UL
IMM GRANULOCYTES NFR BLD: 1 %
INTERPRETATION ECG - MUSE: NORMAL
KETONES UR STRIP-MCNC: NEGATIVE MG/DL
LACTATE SERPL-SCNC: 1.3 MMOL/L (ref 0.7–2)
LEUKOCYTE ESTERASE UR QL STRIP: NEGATIVE
LIPASE SERPL-CCNC: 47 U/L (ref 13–60)
LYMPHOCYTES # BLD AUTO: 1.3 10E3/UL (ref 0.8–5.3)
LYMPHOCYTES NFR BLD AUTO: 7 %
MAGNESIUM SERPL-MCNC: 1.5 MG/DL (ref 1.7–2.3)
MAGNESIUM SERPL-MCNC: 2.3 MG/DL (ref 1.7–2.3)
MCH RBC QN AUTO: 32.9 PG (ref 26.5–33)
MCHC RBC AUTO-ENTMCNC: 36.3 G/DL (ref 31.5–36.5)
MCV RBC AUTO: 91 FL (ref 78–100)
MONOCYTES # BLD AUTO: 1.1 10E3/UL (ref 0–1.3)
MONOCYTES NFR BLD AUTO: 5 %
MUCOUS THREADS #/AREA URNS LPF: PRESENT /LPF
NEUTROPHILS # BLD AUTO: 17.4 10E3/UL (ref 1.6–8.3)
NEUTROPHILS NFR BLD AUTO: 87 %
NITRATE UR QL: NEGATIVE
NRBC # BLD AUTO: 0 10E3/UL
NRBC BLD AUTO-RTO: 0 /100
P AXIS - MUSE: 39 DEGREES
PH UR STRIP: 6.5 [PH] (ref 4.7–8)
PHOSPHATE SERPL-MCNC: 2.7 MG/DL (ref 2.5–4.5)
PLATELET # BLD AUTO: 208 10E3/UL (ref 150–450)
POTASSIUM SERPL-SCNC: 4 MMOL/L (ref 3.4–5.3)
PR INTERVAL - MUSE: 132 MS
PROT SERPL-MCNC: 6.8 G/DL (ref 6.4–8.3)
QRS DURATION - MUSE: 76 MS
QT - MUSE: 378 MS
QTC - MUSE: 427 MS
R AXIS - MUSE: 48 DEGREES
RADIOLOGIST FLAGS: ABNORMAL
RBC # BLD AUTO: 5.5 10E6/UL (ref 4.4–5.9)
RBC URINE: 0 /HPF
SARS-COV-2 RNA RESP QL NAA+PROBE: NEGATIVE
SODIUM SERPL-SCNC: 137 MMOL/L (ref 135–145)
SP GR UR STRIP: 1.01 (ref 1–1.03)
SQUAMOUS EPITHELIAL: 0 /HPF
SYSTOLIC BLOOD PRESSURE - MUSE: NORMAL MMHG
T AXIS - MUSE: 29 DEGREES
UROBILINOGEN UR STRIP-MCNC: NORMAL MG/DL
VENTRICULAR RATE- MUSE: 77 BPM
WBC # BLD AUTO: 20.1 10E3/UL (ref 4–11)
WBC URINE: <1 /HPF

## 2025-07-13 PROCEDURE — 120N000001 HC R&B MED SURG/OB

## 2025-07-13 PROCEDURE — 93005 ELECTROCARDIOGRAM TRACING: CPT

## 2025-07-13 PROCEDURE — 36415 COLL VENOUS BLD VENIPUNCTURE: CPT | Performed by: INTERNAL MEDICINE

## 2025-07-13 PROCEDURE — 84100 ASSAY OF PHOSPHORUS: CPT | Performed by: SURGERY

## 2025-07-13 PROCEDURE — 250N000011 HC RX IP 250 OP 636: Performed by: INTERNAL MEDICINE

## 2025-07-13 PROCEDURE — 96361 HYDRATE IV INFUSION ADD-ON: CPT

## 2025-07-13 PROCEDURE — 250N000009 HC RX 250: Performed by: SURGERY

## 2025-07-13 PROCEDURE — 99285 EMERGENCY DEPT VISIT HI MDM: CPT | Performed by: INTERNAL MEDICINE

## 2025-07-13 PROCEDURE — 81001 URINALYSIS AUTO W/SCOPE: CPT | Performed by: INTERNAL MEDICINE

## 2025-07-13 PROCEDURE — 96375 TX/PRO/DX INJ NEW DRUG ADDON: CPT

## 2025-07-13 PROCEDURE — 83605 ASSAY OF LACTIC ACID: CPT | Performed by: INTERNAL MEDICINE

## 2025-07-13 PROCEDURE — 250N000011 HC RX IP 250 OP 636: Performed by: SURGERY

## 2025-07-13 PROCEDURE — 74177 CT ABD & PELVIS W/CONTRAST: CPT

## 2025-07-13 PROCEDURE — 258N000003 HC RX IP 258 OP 636: Performed by: SURGERY

## 2025-07-13 PROCEDURE — 82247 BILIRUBIN TOTAL: CPT | Performed by: INTERNAL MEDICINE

## 2025-07-13 PROCEDURE — 86140 C-REACTIVE PROTEIN: CPT | Performed by: INTERNAL MEDICINE

## 2025-07-13 PROCEDURE — 258N000003 HC RX IP 258 OP 636: Performed by: INTERNAL MEDICINE

## 2025-07-13 PROCEDURE — 87635 SARS-COV-2 COVID-19 AMP PRB: CPT | Performed by: SURGERY

## 2025-07-13 PROCEDURE — 250N000013 HC RX MED GY IP 250 OP 250 PS 637: Performed by: SURGERY

## 2025-07-13 PROCEDURE — 85025 COMPLETE CBC W/AUTO DIFF WBC: CPT | Performed by: INTERNAL MEDICINE

## 2025-07-13 PROCEDURE — 99285 EMERGENCY DEPT VISIT HI MDM: CPT | Mod: 25 | Performed by: INTERNAL MEDICINE

## 2025-07-13 PROCEDURE — 99222 1ST HOSP IP/OBS MODERATE 55: CPT | Mod: AI | Performed by: SURGERY

## 2025-07-13 PROCEDURE — 36415 COLL VENOUS BLD VENIPUNCTURE: CPT | Performed by: SURGERY

## 2025-07-13 PROCEDURE — 93010 ELECTROCARDIOGRAM REPORT: CPT | Performed by: INTERNAL MEDICINE

## 2025-07-13 PROCEDURE — 96374 THER/PROPH/DIAG INJ IV PUSH: CPT

## 2025-07-13 PROCEDURE — 83690 ASSAY OF LIPASE: CPT | Performed by: INTERNAL MEDICINE

## 2025-07-13 PROCEDURE — 87040 BLOOD CULTURE FOR BACTERIA: CPT | Performed by: INTERNAL MEDICINE

## 2025-07-13 PROCEDURE — 74177 CT ABD & PELVIS W/CONTRAST: CPT | Mod: 26 | Performed by: RADIOLOGY

## 2025-07-13 PROCEDURE — 83735 ASSAY OF MAGNESIUM: CPT | Performed by: SURGERY

## 2025-07-13 RX ORDER — PROCHLORPERAZINE MALEATE 5 MG/1
5 TABLET ORAL EVERY 6 HOURS PRN
Status: DISCONTINUED | OUTPATIENT
Start: 2025-07-13 | End: 2025-07-15

## 2025-07-13 RX ORDER — NALOXONE HYDROCHLORIDE 0.4 MG/ML
0.2 INJECTION, SOLUTION INTRAMUSCULAR; INTRAVENOUS; SUBCUTANEOUS
Status: DISCONTINUED | OUTPATIENT
Start: 2025-07-13 | End: 2025-07-15

## 2025-07-13 RX ORDER — METRONIDAZOLE 500 MG/100ML
500 INJECTION, SOLUTION INTRAVENOUS EVERY 8 HOURS
Status: DISCONTINUED | OUTPATIENT
Start: 2025-07-13 | End: 2025-07-14

## 2025-07-13 RX ORDER — NALOXONE HYDROCHLORIDE 0.4 MG/ML
0.4 INJECTION, SOLUTION INTRAMUSCULAR; INTRAVENOUS; SUBCUTANEOUS
Status: DISCONTINUED | OUTPATIENT
Start: 2025-07-13 | End: 2025-07-15

## 2025-07-13 RX ORDER — ONDANSETRON 4 MG/1
4 TABLET, ORALLY DISINTEGRATING ORAL EVERY 6 HOURS PRN
Status: DISCONTINUED | OUTPATIENT
Start: 2025-07-13 | End: 2025-07-15

## 2025-07-13 RX ORDER — CIPROFLOXACIN 2 MG/ML
400 INJECTION, SOLUTION INTRAVENOUS EVERY 12 HOURS
Status: DISCONTINUED | OUTPATIENT
Start: 2025-07-13 | End: 2025-07-13

## 2025-07-13 RX ORDER — SODIUM CHLORIDE 9 MG/ML
INJECTION, SOLUTION INTRAVENOUS CONTINUOUS
Status: DISCONTINUED | OUTPATIENT
Start: 2025-07-13 | End: 2025-07-15

## 2025-07-13 RX ORDER — HYDROMORPHONE HCL IN WATER/PF 6 MG/30 ML
0.2 PATIENT CONTROLLED ANALGESIA SYRINGE INTRAVENOUS
Refills: 0 | Status: DISCONTINUED | OUTPATIENT
Start: 2025-07-13 | End: 2025-07-15

## 2025-07-13 RX ORDER — LIDOCAINE 40 MG/G
CREAM TOPICAL
Status: DISCONTINUED | OUTPATIENT
Start: 2025-07-13 | End: 2025-07-15

## 2025-07-13 RX ORDER — ACETAMINOPHEN 650 MG/1
650 SUPPOSITORY RECTAL EVERY 4 HOURS PRN
Status: DISCONTINUED | OUTPATIENT
Start: 2025-07-13 | End: 2025-07-15

## 2025-07-13 RX ORDER — TIMOLOL MALEATE 5 MG/ML
1 SOLUTION/ DROPS OPHTHALMIC DAILY
Status: DISCONTINUED | OUTPATIENT
Start: 2025-07-14 | End: 2025-07-15

## 2025-07-13 RX ORDER — HYDROMORPHONE HCL IN WATER/PF 6 MG/30 ML
0.4 PATIENT CONTROLLED ANALGESIA SYRINGE INTRAVENOUS
Refills: 0 | Status: DISCONTINUED | OUTPATIENT
Start: 2025-07-13 | End: 2025-07-14

## 2025-07-13 RX ORDER — ONDANSETRON 2 MG/ML
4 INJECTION INTRAMUSCULAR; INTRAVENOUS ONCE
Status: COMPLETED | OUTPATIENT
Start: 2025-07-13 | End: 2025-07-13

## 2025-07-13 RX ORDER — HYDROMORPHONE HYDROCHLORIDE 1 MG/ML
0.5 INJECTION, SOLUTION INTRAMUSCULAR; INTRAVENOUS; SUBCUTANEOUS ONCE
Refills: 0 | Status: COMPLETED | OUTPATIENT
Start: 2025-07-13 | End: 2025-07-13

## 2025-07-13 RX ORDER — CIPROFLOXACIN 2 MG/ML
400 INJECTION, SOLUTION INTRAVENOUS ONCE
Status: COMPLETED | OUTPATIENT
Start: 2025-07-13 | End: 2025-07-13

## 2025-07-13 RX ORDER — TIMOLOL MALEATE 5 MG/ML
1 SOLUTION/ DROPS OPHTHALMIC DAILY
Status: DISCONTINUED | OUTPATIENT
Start: 2025-07-13 | End: 2025-07-13

## 2025-07-13 RX ORDER — METRONIDAZOLE 500 MG/100ML
500 INJECTION, SOLUTION INTRAVENOUS ONCE
Status: DISCONTINUED | OUTPATIENT
Start: 2025-07-13 | End: 2025-07-13

## 2025-07-13 RX ORDER — ACETAMINOPHEN 325 MG/1
650 TABLET ORAL EVERY 4 HOURS PRN
Status: DISCONTINUED | OUTPATIENT
Start: 2025-07-13 | End: 2025-07-15

## 2025-07-13 RX ORDER — CIPROFLOXACIN 2 MG/ML
400 INJECTION, SOLUTION INTRAVENOUS EVERY 12 HOURS
Status: DISCONTINUED | OUTPATIENT
Start: 2025-07-13 | End: 2025-07-15

## 2025-07-13 RX ORDER — MAGNESIUM SULFATE HEPTAHYDRATE 40 MG/ML
4 INJECTION, SOLUTION INTRAVENOUS ONCE
Status: COMPLETED | OUTPATIENT
Start: 2025-07-13 | End: 2025-07-13

## 2025-07-13 RX ORDER — ONDANSETRON 2 MG/ML
4 INJECTION INTRAMUSCULAR; INTRAVENOUS EVERY 6 HOURS PRN
Status: DISCONTINUED | OUTPATIENT
Start: 2025-07-13 | End: 2025-07-15

## 2025-07-13 RX ORDER — PIPERACILLIN SODIUM, TAZOBACTAM SODIUM 3; .375 G/15ML; G/15ML
3.38 INJECTION, POWDER, LYOPHILIZED, FOR SOLUTION INTRAVENOUS EVERY 6 HOURS
Status: DISCONTINUED | OUTPATIENT
Start: 2025-07-13 | End: 2025-07-15

## 2025-07-13 RX ORDER — IOPAMIDOL 755 MG/ML
86 INJECTION, SOLUTION INTRAVASCULAR ONCE
Status: COMPLETED | OUTPATIENT
Start: 2025-07-13 | End: 2025-07-13

## 2025-07-13 RX ADMIN — CIPROFLOXACIN 400 MG: 2 INJECTION, SOLUTION INTRAVENOUS at 18:41

## 2025-07-13 RX ADMIN — FAMOTIDINE 20 MG: 10 INJECTION, SOLUTION INTRAVENOUS at 13:06

## 2025-07-13 RX ADMIN — MAGNESIUM SULFATE HEPTAHYDRATE 4 G: 40 INJECTION, SOLUTION INTRAVENOUS at 17:25

## 2025-07-13 RX ADMIN — HYDROMORPHONE HYDROCHLORIDE 0.5 MG: 1 INJECTION, SOLUTION INTRAMUSCULAR; INTRAVENOUS; SUBCUTANEOUS at 05:13

## 2025-07-13 RX ADMIN — POTASSIUM & SODIUM PHOSPHATES POWDER PACK 280-160-250 MG 1 PACKET: 280-160-250 PACK at 21:51

## 2025-07-13 RX ADMIN — HYDROMORPHONE HYDROCHLORIDE 0.4 MG: 0.2 INJECTION, SOLUTION INTRAMUSCULAR; INTRAVENOUS; SUBCUTANEOUS at 20:56

## 2025-07-13 RX ADMIN — ACETAMINOPHEN 650 MG: 325 TABLET ORAL at 17:22

## 2025-07-13 RX ADMIN — TIMOLOL MALEATE 1 DROP: 5 SOLUTION OPHTHALMIC at 13:12

## 2025-07-13 RX ADMIN — METRONIDAZOLE 500 MG: 500 INJECTION, SOLUTION INTRAVENOUS at 21:56

## 2025-07-13 RX ADMIN — FAMOTIDINE 20 MG: 10 INJECTION, SOLUTION INTRAVENOUS at 23:14

## 2025-07-13 RX ADMIN — PIPERACILLIN AND TAZOBACTAM 3.38 G: 3; .375 INJECTION, POWDER, FOR SOLUTION INTRAVENOUS at 21:51

## 2025-07-13 RX ADMIN — CIPROFLOXACIN 400 MG: 400 INJECTION, SOLUTION INTRAVENOUS at 06:46

## 2025-07-13 RX ADMIN — HYDROMORPHONE HYDROCHLORIDE 0.4 MG: 0.2 INJECTION, SOLUTION INTRAMUSCULAR; INTRAVENOUS; SUBCUTANEOUS at 15:23

## 2025-07-13 RX ADMIN — HYDROMORPHONE HYDROCHLORIDE 0.2 MG: 0.2 INJECTION, SOLUTION INTRAMUSCULAR; INTRAVENOUS; SUBCUTANEOUS at 12:52

## 2025-07-13 RX ADMIN — SODIUM CHLORIDE 1000 ML: 9 INJECTION, SOLUTION INTRAVENOUS at 05:10

## 2025-07-13 RX ADMIN — METRONIDAZOLE 500 MG: 500 INJECTION, SOLUTION INTRAVENOUS at 13:08

## 2025-07-13 RX ADMIN — SODIUM CHLORIDE: 9 INJECTION, SOLUTION INTRAVENOUS at 12:56

## 2025-07-13 RX ADMIN — POTASSIUM & SODIUM PHOSPHATES POWDER PACK 280-160-250 MG 1 PACKET: 280-160-250 PACK at 17:30

## 2025-07-13 RX ADMIN — ONDANSETRON 4 MG: 2 INJECTION INTRAMUSCULAR; INTRAVENOUS at 05:11

## 2025-07-13 RX ADMIN — HYDROMORPHONE HYDROCHLORIDE 0.4 MG: 0.2 INJECTION, SOLUTION INTRAMUSCULAR; INTRAVENOUS; SUBCUTANEOUS at 17:35

## 2025-07-13 RX ADMIN — SODIUM CHLORIDE 1000 ML: 9 INJECTION, SOLUTION INTRAVENOUS at 11:37

## 2025-07-13 RX ADMIN — PIPERACILLIN AND TAZOBACTAM 3.38 G: 3; .375 INJECTION, POWDER, FOR SOLUTION INTRAVENOUS at 15:24

## 2025-07-13 RX ADMIN — IOPAMIDOL 86 ML: 755 INJECTION, SOLUTION INTRAVENOUS at 05:52

## 2025-07-13 ASSESSMENT — ENCOUNTER SYMPTOMS
CHILLS: 0
PALPITATIONS: 0
ANAL BLEEDING: 0
DIZZINESS: 0
VOICE CHANGE: 0
MYALGIAS: 0
COLOR CHANGE: 0
CONFUSION: 0
SHORTNESS OF BREATH: 0
WHEEZING: 0
FREQUENCY: 0
FLANK PAIN: 0
ABDOMINAL DISTENTION: 0
VOMITING: 0
NAUSEA: 1
WEAKNESS: 0
SLEEP DISTURBANCE: 0
COUGH: 0
HEADACHES: 0
NECK PAIN: 0
ABDOMINAL PAIN: 1
CHEST TIGHTNESS: 0
DIAPHORESIS: 1
NUMBNESS: 0
BACK PAIN: 0
LIGHT-HEADEDNESS: 0
BLOOD IN STOOL: 0
DYSURIA: 0
FEVER: 0

## 2025-07-13 ASSESSMENT — COLUMBIA-SUICIDE SEVERITY RATING SCALE - C-SSRS
1. IN THE PAST MONTH, HAVE YOU WISHED YOU WERE DEAD OR WISHED YOU COULD GO TO SLEEP AND NOT WAKE UP?: NO
2. HAVE YOU ACTUALLY HAD ANY THOUGHTS OF KILLING YOURSELF IN THE PAST MONTH?: NO
6. HAVE YOU EVER DONE ANYTHING, STARTED TO DO ANYTHING, OR PREPARED TO DO ANYTHING TO END YOUR LIFE?: NO

## 2025-07-13 ASSESSMENT — ACTIVITIES OF DAILY LIVING (ADL)
ADLS_ACUITY_SCORE: 23
ADLS_ACUITY_SCORE: 18
ADLS_ACUITY_SCORE: 23
ADLS_ACUITY_SCORE: 41
ADLS_ACUITY_SCORE: 18
ADLS_ACUITY_SCORE: 22
ADLS_ACUITY_SCORE: 23
ADLS_ACUITY_SCORE: 23
ADLS_ACUITY_SCORE: 41
ADLS_ACUITY_SCORE: 18
ADLS_ACUITY_SCORE: 41
ADLS_ACUITY_SCORE: 41
ADLS_ACUITY_SCORE: 18
ADLS_ACUITY_SCORE: 41
ADLS_ACUITY_SCORE: 22
ADLS_ACUITY_SCORE: 41
ADLS_ACUITY_SCORE: 18
ADLS_ACUITY_SCORE: 23
ADLS_ACUITY_SCORE: 41

## 2025-07-13 NOTE — ED NOTES
Pt brought to acute care via stretcher with wife at side, pt stood and pivoted to cot, call light within reach, denies needs. Acute care UA informed patient was in room, stated she would let nurse know patient is on floor.

## 2025-07-13 NOTE — ED NOTES
Report given to JACKIE Portillo. Patient to go upstairs after 7:30 am. Patient aware of admission by surgeon. States pain is even better than just a bit ago and rates 4/10. Wife remains at bedside.

## 2025-07-13 NOTE — ED PROVIDER NOTES
History     Chief Complaint   Patient presents with    Abdominal Pain     The history is provided by the patient.   Abdominal Pain  Pain location:  LLQ  Pain quality: sharp    Pain radiates to:  Periumbilical region  Pain severity:  Severe  Onset quality:  Sudden  Duration:  8 hours  Timing:  Constant  Progression:  Worsening  Chronicity:  New  Associated symptoms: nausea    Associated symptoms: no chest pain, no chills, no cough, no dysuria, no fever, no shortness of breath and no vomiting          Allergies:  No Known Allergies    Problem List:    Patient Active Problem List    Diagnosis Date Noted    Diverticulitis of colon with perforation 2025     Priority: Medium    ACP (advance care planning) 2017     Priority: Medium     Advance Care Planning 3/1/2017: ACP Review of Chart / Resources Provided:  Reviewed chart for advance care plan.  Samy Lu has been provided information and resources to begin or update their advance care plan.  Added by Lo Ortega                Past Medical History:    No past medical history on file.    Past Surgical History:    Past Surgical History:   Procedure Laterality Date    ABDOMEN SURGERY      right IHR    ABDOMEN SURGERY      2 hernia repairs    ABDOMEN SURGERY      Umbilical hernia repair    COLONOSCOPY  10/21/2009    Due     COLONOSCOPY N/A 2018    Procedure: COLONOSCOPY With POLYPECTOMY,RESOLUTION CLIP, BIOPSY;  Surgeon: Jesus Shi MD;  Location: [facility]       Family History:    Family History   Problem Relation Age of Onset    Cancer Father         Non Hodgkins Lymphoma    Diabetes Paternal Grandmother        Social History:  Marital Status:   [2]  Social History     Tobacco Use    Smoking status: Former     Current packs/day: 0.00     Average packs/day: 0.3 packs/day for 37.0 years (9.3 ttl pk-yrs)     Types: Cigarettes     Start date: 1969     Quit date: 2006     Years since quittin.5    Smokeless  "tobacco: Never   Substance Use Topics    Alcohol use: Yes     Comment: rare    Drug use: Yes     Types: Marijuana     Comment: \"for glaucoma\"        Medications:    timolol maleate (TIMOPTIC) 0.5 % ophthalmic solution          Review of Systems   Constitutional:  Positive for diaphoresis. Negative for chills and fever.   HENT:  Negative for voice change.    Eyes:  Negative for visual disturbance.   Respiratory:  Negative for cough, chest tightness, shortness of breath and wheezing.    Cardiovascular:  Negative for chest pain, palpitations and leg swelling.   Gastrointestinal:  Positive for abdominal pain and nausea. Negative for abdominal distention, anal bleeding, blood in stool and vomiting.   Genitourinary:  Negative for decreased urine volume, dysuria, flank pain and frequency.   Musculoskeletal:  Negative for back pain, gait problem, myalgias and neck pain.   Skin:  Negative for color change, pallor and rash.   Neurological:  Negative for dizziness, syncope, weakness, light-headedness, numbness and headaches.   Psychiatric/Behavioral:  Negative for confusion, sleep disturbance and suicidal ideas.        Physical Exam   BP: (!) 152/82  Pulse: 82  Temp: 96.9  F (36.1  C)  Resp: 20  Weight: 79.8 kg (176 lb)  SpO2: 92 %      Physical Exam  Vitals and nursing note reviewed.   Constitutional:       Appearance: He is well-developed.   HENT:      Head: Normocephalic and atraumatic.   Eyes:      Conjunctiva/sclera: Conjunctivae normal.      Pupils: Pupils are equal, round, and reactive to light.   Neck:      Thyroid: No thyromegaly.      Vascular: No JVD.      Trachea: No tracheal deviation.   Cardiovascular:      Rate and Rhythm: Normal rate and regular rhythm.      Heart sounds: Normal heart sounds. No murmur heard.     No gallop.   Pulmonary:      Effort: Pulmonary effort is normal. No respiratory distress.      Breath sounds: Normal breath sounds. No stridor. No wheezing or rales.   Chest:      Chest wall: No " tenderness.   Abdominal:      General: Bowel sounds are normal. There is no distension.      Palpations: Abdomen is soft. There is no mass.      Tenderness: There is abdominal tenderness in the left lower quadrant. There is no guarding or rebound.   Musculoskeletal:         General: No tenderness. Normal range of motion.      Cervical back: Normal range of motion and neck supple.   Lymphadenopathy:      Cervical: No cervical adenopathy.   Skin:     General: Skin is warm.      Coloration: Skin is not pale.      Findings: No erythema or rash.   Neurological:      Mental Status: He is alert and oriented to person, place, and time.   Psychiatric:         Behavior: Behavior normal.         ED Course        Procedures                  Recent Results (from the past 24 hours)   EKG 12-lead, tracing only   Result Value Ref Range    Systolic Blood Pressure  mmHg    Diastolic Blood Pressure  mmHg    Ventricular Rate 77 BPM    Atrial Rate 77 BPM    VA Interval 132 ms    QRS Duration 76 ms     ms    QTc 427 ms    P Axis 39 degrees    R AXIS 48 degrees    T Axis 29 degrees    Interpretation ECG       Sinus rhythm  Normal ECG  When compared with ECG of 02-Apr-2025 08:20,  No significant change was found     [place] Draw    Narrative    The following orders were created for panel order [place] Draw.  Procedure                               Abnormality         Status                     ---------                               -----------         ------                     Extra Blue Top Tube[[id number]]                             Final result               Extra Red Top Tube[[id number]]                              Final result               Extra Green Top (Lithiu...[[id number]]                      Final result               Extra Purple Top Tube[[id number]]                           Final result               Extra Green Top (Lithiu...[[id number]]                                                   Please view results for  these tests on the individual orders.   Comprehensive metabolic panel   Result Value Ref Range    Sodium 137 135 - 145 mmol/L    Potassium 4.0 3.4 - 5.3 mmol/L    Carbon Dioxide (CO2) 21 (L) 22 - 29 mmol/L    Anion Gap 14 7 - 15 mmol/L    Urea Nitrogen 10.5 8.0 - 23.0 mg/dL    Creatinine 1.01 0.67 - 1.17 mg/dL    GFR Estimate 80 >60 mL/min/1.73m2    Calcium 8.9 8.8 - 10.4 mg/dL    Chloride 102 98 - 107 mmol/L    Glucose 135 (H) 70 - 99 mg/dL    Alkaline Phosphatase 51 40 - 150 U/L    AST 19 0 - 45 U/L    ALT 17 0 - 70 U/L    Protein Total 6.8 6.4 - 8.3 g/dL    Albumin 4.0 3.5 - 5.2 g/dL    Bilirubin Total 0.9 <=1.2 mg/dL   CBC with Platelets & Differential    Narrative    The following orders were created for panel order CBC with Platelets & Differential.  Procedure                               Abnormality         Status                     ---------                               -----------         ------                     CBC with platelets and ...[[id number]]  Abnormal            Final result                 Please view results for these tests on the individual orders.   Lactic acid whole blood   Result Value Ref Range    Lactic Acid 1.3 0.7 - 2.0 mmol/L   CRP inflammation   Result Value Ref Range    CRP Inflammation 6.22 (H) <5.00 mg/L   Lipase   Result Value Ref Range    Lipase 47 13 - 60 U/L   Extra Blue Top Tube   Result Value Ref Range    Hold Specimen JIC    Extra Red Top Tube   Result Value Ref Range    Hold Specimen JIC    Extra Green Top (Lithium Heparin) Tube   Result Value Ref Range    Hold Specimen JIC    Extra Purple Top Tube   Result Value Ref Range    Hold Specimen JIC    CBC with platelets and differential   Result Value Ref Range    WBC Count 20.1 (H) 4.0 - 11.0 10e3/uL    RBC Count 5.50 4.40 - 5.90 10e6/uL    Hemoglobin 18.1 (H) 13.3 - 17.7 g/dL    Hematocrit 49.8 40.0 - 53.0 %    MCV 91 78 - 100 fL    MCH 32.9 26.5 - 33.0 pg    MCHC 36.3 31.5 - 36.5 g/dL    RDW 12.9 10.0 - 15.0 %    Platelet  Count 208 150 - 450 10e3/uL    % Neutrophils 87 %    % Lymphocytes 7 %    % Monocytes 5 %    % Eosinophils 1 %    % Basophils 0 %    % Immature Granulocytes 1 %    NRBCs per 100 WBC 0 <1 /100    Absolute Neutrophils 17.4 (H) 1.6 - 8.3 10e3/uL    Absolute Lymphocytes 1.3 0.8 - 5.3 10e3/uL    Absolute Monocytes 1.1 0.0 - 1.3 10e3/uL    Absolute Eosinophils 0.2 0.0 - 0.7 10e3/uL    Absolute Basophils 0.1 0.0 - 0.2 10e3/uL    Absolute Immature Granulocytes 0.1 <=0.4 10e3/uL    Absolute NRBCs 0.0 10e3/uL   Abd/pelvis CT - IV contrast only TRAUMA / AAA   Result Value Ref Range    Radiologist flags Perforated viscus (AA)     Narrative    EXAM: CT ABDOMEN PELVIS W CONTRAST  LOCATION: [facility]  DATE: 7/13/2025    INDICATION: LLQ pain  COMPARISON: CT from 4/2/2025.  TECHNIQUE: CT scan of the abdomen and pelvis was performed following injection of IV contrast. Multiplanar reformats were obtained. Dose reduction techniques were used.  CONTRAST: Isovue 370 86ml    FINDINGS:   LOWER CHEST: Bibasilar atelectasis or airspace disease.    HEPATOBILIARY: Normal.    PANCREAS: Slight prominence of the pancreatic duct, but no inflammation or mass demonstrated.    SPLEEN: Normal.    ADRENAL GLANDS: Normal.    KIDNEYS/BLADDER: Normal.    BOWEL: Distal colonic diverticulosis. Wall thickening at the junction of the descending and sigmoid colon with pericolonic edema. Scattered locules of free air in the adjacent mesentery, with nondependent free air in the upper abdomen. Normal appendix.   No mechanical obstruction. No drainable abscess.    LYMPH NODES: Normal.    VASCULATURE: Mild atherosclerotic change. No abdominal aortic aneurysm.    PELVIC ORGANS: Normal.    MUSCULOSKELETAL: Multilevel lumbar spine degenerative disc change.      Impression    IMPRESSION:   1.  Findings consistent with perforated diverticulitis at the junction of the descending and sigmoid colon. No drainable abscess.    [Critical Result: Perforated  viscus]    Finding was identified on 7/13/2025 6:08 AM CDT.     1.  Dr. Crowe was contacted by me on 7/13/2025 6:11 AM CDT and verbalized understanding of the critical result.        Medications   ciprofloxacin (CIPRO) infusion 400 mg (has no administration in time range)   metroNIDAZOLE (FLAGYL) infusion 500 mg (has no administration in time range)   sodium chloride 0.9% BOLUS 1,000 mL (0 mLs Intravenous Stopped 7/13/25 0543)   HYDROmorphone (PF) (DILAUDID) injection 0.5 mg (0.5 mg Intravenous $Given 7/13/25 0513)   ondansetron (ZOFRAN) injection 4 mg (4 mg Intravenous $Given 7/13/25 0511)   iopamidol (ISOVUE-370) solution 86 mL (86 mLs Intravenous $Given 7/13/25 0552)   sodium chloride (PF) 0.9% PF flush 50 mL (50 mLs Intravenous $Given 7/13/25 0511)       Assessments & Plan (with Medical Decision Making)   Severe LLQ , hx of diverticulitis    Labs reviewed  CT abd: perforated diverticulitis  Pain controlled with dilaudid  Cipro , flagyl started  I spoke to Dr Briceno, accepted for admission   I have reviewed the nursing notes.    I have reviewed the findings, diagnosis, plan and need for follow up with the patient.          New Prescriptions    No medications on file       Final diagnoses:   Diverticulitis of colon with perforation       7/13/2025   [facility]       Garret Crowe MD  07/13/25 [id number]

## 2025-07-13 NOTE — ED TRIAGE NOTES
Ambulated into triage with wife at side with c/o severe abdominal pain that radiates into back. Rates pain 8/10. States the pain started last night and has gotten progressively worse over the night. Patient is visibly diaphoretic with sweat spots through his sweatshirt. Also noted to be belching. States he did vomit once. States he thought he may have had a fever due to how much he was sweating but did not check it. Reports taking his oral oxy earlier tonight without any relief. Denies cardiac history. Reports history of diverticulitis and colitis but states this feels way different than that. Brought directly to ER room 6. Changed into gown. Monitors in place. Call light within reach.

## 2025-07-13 NOTE — PLAN OF CARE
Goal Outcome Evaluation:      Plan of Care Reviewed With: patient    Overall Patient Progress: no changeOverall Patient Progress: no change    Pt is A & OX4. Pt had low grade temperature this am of 99.3, pt declined Tylenol. Pt had pain 8/10, received prn Dilaudid 0.2 mg once, then two hours later administered 0.4 mg Dilaudid, w/effective results. Pt declined pain medication earlier in shift causing pain to elevate. Pt is independent in room, gripper socks applied. PIV infusing NS at 100 ml/hr. Pt received scheduled Flagyl et Zosyn. Pt also received ordered liter bolus of NS shortly after admit to floor. Pt is on a clear liquid diet. Pt makes need known.    Face to face report given with opportunity to observe patient.    Report given to JACKIE Sheridan RN   7/13/2025  3:30 PM

## 2025-07-13 NOTE — H&P
"Admission History and Physical  7/13/2025    Patient: Samy Lu    Admitting Physician: Taiwo Briceno MD, MD    Admitting diagnosis: Acute diverticulitis, has attempted outpatient therapy, does of evidence of micro-perforation, does not evidence of abscess.    Expected length of stay is greater than 2 days    This is a 70 year old male with acute diverticulitis.    Has had a CT.     The patient is not currently on antibiotics.     Does have sweats.    Does not have nausea/vomitting.   is passing flatus.     is passing stool.     Has had a prior colonoscopy in 2018 showing diverticulosis.      Past Medical History:  No past medical history on file.    Past Surgical History:  Past Surgical History:   Procedure Laterality Date    ABDOMEN SURGERY      right IHR    ABDOMEN SURGERY      2 hernia repairs    ABDOMEN SURGERY      Umbilical hernia repair    COLONOSCOPY  10/21/2009    Due 2014    COLONOSCOPY N/A 11/19/2018    Procedure: COLONOSCOPY With POLYPECTOMY,RESOLUTION CLIP, BIOPSY;  Surgeon: Jesus Shi MD;  Location: [facility]       Family History History:  Family History   Problem Relation Age of Onset    Cancer Father         Non Hodgkins Lymphoma    Diabetes Paternal Grandmother        History of Tobacco Use:  History   Smoking Status    Former    Types: Cigarettes   Smokeless Tobacco    Never       Current Medications:  No current outpatient medications on file.       Allergies:  No Known Allergies    ROS:  Pertinent items are noted in HPI.  All other systems are negative.    PHYSICAL EXAM:     Vital signs: /75   Pulse 76   Temp 99.3  F (37.4  C) (Tympanic)   Resp 20   Ht 1.753 m (5' 9\")   Wt 79.4 kg (175 lb 0.7 oz)   SpO2 97%   BMI 25.85 kg/m     Weight: [unfilled]   BMI: Body mass index is 25.85 kg/m .   General: Normal, healthy, cooperative, in no acute distress, alert   Skin: no jaundice   HEENT: PERRLA and EOMI   Neck: supple   Lungs: clear to auscultation   CV: Regular rate and " rhythm without murmer   Abdominal: Tender especially on the left lower quadrant.  No peritoneal signs.  Bowel sounds positive.     Extremities: No cyanosis, clubbing or edema noted bilaterally in Upper and Lower Extremities   Neurological: without deficit    LABORATORY:  CBC Results:   Recent Labs   Lab Test 07/13/25  0505   WBC 20.1*   RBC 5.50   HGB 18.1*   HCT 49.8   MCV 91   MCH 32.9   MCHC 36.3   RDW 12.9          Last Comprehensive Metabolic Panel:  Sodium   Date Value Ref Range Status   07/13/2025 137 135 - 145 mmol/L Final   03/03/2021 135 133 - 144 mmol/L Final     Potassium   Date Value Ref Range Status   07/13/2025 4.0 3.4 - 5.3 mmol/L Final   08/01/2022 3.7 3.4 - 5.3 mmol/L Final   03/03/2021 4.4 3.4 - 5.3 mmol/L Final     Chloride   Date Value Ref Range Status   07/13/2025 102 98 - 107 mmol/L Final   08/01/2022 103 94 - 109 mmol/L Final   03/03/2021 103 94 - 109 mmol/L Final     Carbon Dioxide   Date Value Ref Range Status   03/03/2021 28 20 - 32 mmol/L Final     Carbon Dioxide (CO2)   Date Value Ref Range Status   07/13/2025 21 (L) 22 - 29 mmol/L Final   08/01/2022 32 20 - 32 mmol/L Final     Anion Gap   Date Value Ref Range Status   07/13/2025 14 7 - 15 mmol/L Final   08/01/2022 2 (L) 3 - 14 mmol/L Final   03/03/2021 4 3 - 14 mmol/L Final     Glucose   Date Value Ref Range Status   07/13/2025 135 (H) 70 - 99 mg/dL Final   08/01/2022 81 70 - 99 mg/dL Final   03/03/2021 104 (H) 70 - 99 mg/dL Final     Comment:     Non Fasting     Urea Nitrogen   Date Value Ref Range Status   07/13/2025 10.5 8.0 - 23.0 mg/dL Final   08/01/2022 14 7 - 30 mg/dL Final   03/03/2021 12 7 - 30 mg/dL Final     Creatinine   Date Value Ref Range Status   07/13/2025 1.01 0.67 - 1.17 mg/dL Final   03/03/2021 0.96 0.66 - 1.25 mg/dL Final     GFR Estimate   Date Value Ref Range Status   07/13/2025 80 >60 mL/min/1.73m2 Final     Comment:     eGFR calculated using 2021 CKD-EPI equation.   03/03/2021 82 >60 mL/min/[1.73_m2] Final      Comment:     Non  GFR Calc  Starting 12/18/2018, serum creatinine based estimated GFR (eGFR) will be   calculated using the Chronic Kidney Disease Epidemiology Collaboration   (CKD-EPI) equation.       Calcium   Date Value Ref Range Status   07/13/2025 8.9 8.8 - 10.4 mg/dL Final   03/03/2021 8.9 8.5 - 10.1 mg/dL Final     Bilirubin Total   Date Value Ref Range Status   07/13/2025 0.9 <=1.2 mg/dL Final   03/03/2021 0.6 0.2 - 1.3 mg/dL Final     Alkaline Phosphatase   Date Value Ref Range Status   07/13/2025 51 40 - 150 U/L Final   03/03/2021 45 40 - 150 U/L Final     ALT   Date Value Ref Range Status   07/13/2025 17 0 - 70 U/L Final   03/03/2021 29 0 - 70 U/L Final     AST   Date Value Ref Range Status   07/13/2025 19 0 - 45 U/L Final   03/03/2021 15 0 - 45 U/L Final       RADIOLOGY:    I have reviewed the patients CT of the abdomen and pelvis and agree that there is evidence of diverticulitis, with evidence of micro perforation, without evidence of claudio perforation, without evidence of abscess.    ASSESSMENT: This is a 70 year old male with acute diverticulitis.     The patient does not show signs of sepsis.   The patient does not show evidence of diverticular abscess.   The patient does not have an acute abdomen.    PLAN: The patient will be admitted to the hospital.     Urgent surgery is not indicated.    A trial of IV antibiotics will be tried.     The plan was dicussed with the patient and/or his/her care giver. All of their questions were answered.  They consent to the plan above.

## 2025-07-14 ENCOUNTER — APPOINTMENT (OUTPATIENT)
Dept: MRI IMAGING | Facility: HOSPITAL | Age: 70
End: 2025-07-14
Attending: SURGERY
Payer: MEDICARE

## 2025-07-14 ENCOUNTER — APPOINTMENT (OUTPATIENT)
Dept: ULTRASOUND IMAGING | Facility: HOSPITAL | Age: 70
End: 2025-07-14
Attending: SURGERY
Payer: MEDICARE

## 2025-07-14 LAB
ALBUMIN SERPL BCG-MCNC: 3.2 G/DL (ref 3.5–5.2)
ALP SERPL-CCNC: 48 U/L (ref 40–150)
ALT SERPL W P-5'-P-CCNC: 43 U/L (ref 0–70)
ANION GAP SERPL CALCULATED.3IONS-SCNC: 13 MMOL/L (ref 7–15)
AST SERPL W P-5'-P-CCNC: 55 U/L (ref 0–45)
BILIRUB SERPL-MCNC: 2.6 MG/DL
BUN SERPL-MCNC: 10.1 MG/DL (ref 8–23)
CALCIUM SERPL-MCNC: 7.9 MG/DL (ref 8.8–10.4)
CHLORIDE SERPL-SCNC: 101 MMOL/L (ref 98–107)
CREAT SERPL-MCNC: 1.07 MG/DL (ref 0.67–1.17)
EGFRCR SERPLBLD CKD-EPI 2021: 75 ML/MIN/1.73M2
ERYTHROCYTE [DISTWIDTH] IN BLOOD BY AUTOMATED COUNT: 13 % (ref 10–15)
GLUCOSE SERPL-MCNC: 112 MG/DL (ref 70–99)
HCO3 SERPL-SCNC: 19 MMOL/L (ref 22–29)
HCT VFR BLD AUTO: 44.7 % (ref 40–53)
HGB BLD-MCNC: 16.3 G/DL (ref 13.3–17.7)
MAGNESIUM SERPL-MCNC: 1.9 MG/DL (ref 1.7–2.3)
MCH RBC QN AUTO: 32.9 PG (ref 26.5–33)
MCHC RBC AUTO-ENTMCNC: 36.5 G/DL (ref 31.5–36.5)
MCV RBC AUTO: 90 FL (ref 78–100)
PHOSPHATE SERPL-MCNC: 2.7 MG/DL (ref 2.5–4.5)
PLATELET # BLD AUTO: 166 10E3/UL (ref 150–450)
POTASSIUM SERPL-SCNC: 3.9 MMOL/L (ref 3.4–5.3)
PROT SERPL-MCNC: 5.7 G/DL (ref 6.4–8.3)
RBC # BLD AUTO: 4.95 10E6/UL (ref 4.4–5.9)
SODIUM SERPL-SCNC: 133 MMOL/L (ref 135–145)
WBC # BLD AUTO: 21.9 10E3/UL (ref 4–11)

## 2025-07-14 PROCEDURE — 74181 MRI ABDOMEN W/O CONTRAST: CPT

## 2025-07-14 PROCEDURE — 120N000001 HC R&B MED SURG/OB

## 2025-07-14 PROCEDURE — 76705 ECHO EXAM OF ABDOMEN: CPT

## 2025-07-14 PROCEDURE — 99232 SBSQ HOSP IP/OBS MODERATE 35: CPT | Performed by: SURGERY

## 2025-07-14 PROCEDURE — 258N000003 HC RX IP 258 OP 636: Performed by: SURGERY

## 2025-07-14 PROCEDURE — 250N000011 HC RX IP 250 OP 636: Performed by: SURGERY

## 2025-07-14 PROCEDURE — 36415 COLL VENOUS BLD VENIPUNCTURE: CPT | Performed by: SURGERY

## 2025-07-14 PROCEDURE — 83735 ASSAY OF MAGNESIUM: CPT | Performed by: SURGERY

## 2025-07-14 PROCEDURE — 250N000009 HC RX 250: Performed by: SURGERY

## 2025-07-14 PROCEDURE — 84100 ASSAY OF PHOSPHORUS: CPT | Performed by: SURGERY

## 2025-07-14 PROCEDURE — 85027 COMPLETE CBC AUTOMATED: CPT | Performed by: SURGERY

## 2025-07-14 PROCEDURE — 250N000013 HC RX MED GY IP 250 OP 250 PS 637: Performed by: SURGERY

## 2025-07-14 PROCEDURE — 74181 MRI ABDOMEN W/O CONTRAST: CPT | Mod: 26 | Performed by: RADIOLOGY

## 2025-07-14 PROCEDURE — 80053 COMPREHEN METABOLIC PANEL: CPT | Performed by: SURGERY

## 2025-07-14 PROCEDURE — 76705 ECHO EXAM OF ABDOMEN: CPT | Mod: 26 | Performed by: RADIOLOGY

## 2025-07-14 RX ORDER — HYDROMORPHONE HYDROCHLORIDE 1 MG/ML
.5-1 INJECTION, SOLUTION INTRAMUSCULAR; INTRAVENOUS; SUBCUTANEOUS
Status: DISCONTINUED | OUTPATIENT
Start: 2025-07-14 | End: 2025-07-15

## 2025-07-14 RX ORDER — MAGNESIUM SULFATE HEPTAHYDRATE 40 MG/ML
2 INJECTION, SOLUTION INTRAVENOUS ONCE
Status: COMPLETED | OUTPATIENT
Start: 2025-07-14 | End: 2025-07-14

## 2025-07-14 RX ORDER — METRONIDAZOLE 500 MG/100ML
500 INJECTION, SOLUTION INTRAVENOUS EVERY 12 HOURS
Status: DISCONTINUED | OUTPATIENT
Start: 2025-07-14 | End: 2025-07-15

## 2025-07-14 RX ADMIN — HYDROMORPHONE HYDROCHLORIDE 0.4 MG: 0.2 INJECTION, SOLUTION INTRAMUSCULAR; INTRAVENOUS; SUBCUTANEOUS at 02:55

## 2025-07-14 RX ADMIN — HYDROMORPHONE HYDROCHLORIDE 1 MG: 1 INJECTION, SOLUTION INTRAMUSCULAR; INTRAVENOUS; SUBCUTANEOUS at 23:15

## 2025-07-14 RX ADMIN — FAMOTIDINE 20 MG: 10 INJECTION, SOLUTION INTRAVENOUS at 23:08

## 2025-07-14 RX ADMIN — TIMOLOL MALEATE 1 DROP: 5 SOLUTION OPHTHALMIC at 08:58

## 2025-07-14 RX ADMIN — CIPROFLOXACIN 400 MG: 2 INJECTION, SOLUTION INTRAVENOUS at 06:28

## 2025-07-14 RX ADMIN — SODIUM PHOSPHATE, MONOBASIC, MONOHYDRATE AND SODIUM PHOSPHATE, DIBASIC, ANHYDROUS 9 MMOL: 142; 276 INJECTION, SOLUTION INTRAVENOUS at 08:57

## 2025-07-14 RX ADMIN — PIPERACILLIN AND TAZOBACTAM 3.38 G: 3; .375 INJECTION, POWDER, FOR SOLUTION INTRAVENOUS at 08:58

## 2025-07-14 RX ADMIN — METRONIDAZOLE 500 MG: 500 INJECTION, SOLUTION INTRAVENOUS at 16:47

## 2025-07-14 RX ADMIN — ACETAMINOPHEN 650 MG: 325 TABLET ORAL at 08:57

## 2025-07-14 RX ADMIN — HYDROMORPHONE HYDROCHLORIDE 0.4 MG: 0.2 INJECTION, SOLUTION INTRAMUSCULAR; INTRAVENOUS; SUBCUTANEOUS at 16:29

## 2025-07-14 RX ADMIN — METRONIDAZOLE 500 MG: 500 INJECTION, SOLUTION INTRAVENOUS at 05:36

## 2025-07-14 RX ADMIN — HYDROMORPHONE HYDROCHLORIDE 0.4 MG: 0.2 INJECTION, SOLUTION INTRAMUSCULAR; INTRAVENOUS; SUBCUTANEOUS at 08:48

## 2025-07-14 RX ADMIN — MAGNESIUM SULFATE HEPTAHYDRATE 2 G: 40 INJECTION, SOLUTION INTRAVENOUS at 07:47

## 2025-07-14 RX ADMIN — PIPERACILLIN AND TAZOBACTAM 3.38 G: 3; .375 INJECTION, POWDER, FOR SOLUTION INTRAVENOUS at 14:34

## 2025-07-14 RX ADMIN — CIPROFLOXACIN 400 MG: 2 INJECTION, SOLUTION INTRAVENOUS at 18:20

## 2025-07-14 RX ADMIN — HYDROMORPHONE HYDROCHLORIDE 0.4 MG: 0.2 INJECTION, SOLUTION INTRAMUSCULAR; INTRAVENOUS; SUBCUTANEOUS at 14:24

## 2025-07-14 RX ADMIN — PIPERACILLIN AND TAZOBACTAM 3.38 G: 3; .375 INJECTION, POWDER, FOR SOLUTION INTRAVENOUS at 20:30

## 2025-07-14 RX ADMIN — SODIUM CHLORIDE: 9 INJECTION, SOLUTION INTRAVENOUS at 16:55

## 2025-07-14 RX ADMIN — PIPERACILLIN AND TAZOBACTAM 3.38 G: 3; .375 INJECTION, POWDER, FOR SOLUTION INTRAVENOUS at 02:55

## 2025-07-14 RX ADMIN — HYDROMORPHONE HYDROCHLORIDE 1 MG: 1 INJECTION, SOLUTION INTRAMUSCULAR; INTRAVENOUS; SUBCUTANEOUS at 18:17

## 2025-07-14 RX ADMIN — FAMOTIDINE 20 MG: 10 INJECTION, SOLUTION INTRAVENOUS at 11:51

## 2025-07-14 RX ADMIN — SODIUM CHLORIDE: 9 INJECTION, SOLUTION INTRAVENOUS at 03:02

## 2025-07-14 ASSESSMENT — ACTIVITIES OF DAILY LIVING (ADL)
ADLS_ACUITY_SCORE: 23

## 2025-07-14 NOTE — PHARMACY-MEDICATION REGIMEN REVIEW
Pharmacy Antimicrobial Stewardship Assessment     Current Antimicrobial Therapy:                Recent Antibiotics:      Recommendations/Interventions:  1. Choose Zosyn monotherapy OR combo therapy of Cipro + Flagyl. No need for double coverage.   2. Flagyl dosing changed to every 12 hrs IV per Infectious Disease Medication Dose Adjustment Criteria, as it is for the indication of bacteremia, CNS infection or C.dif.           Cintia Diane, [facility]  July 14, 2025

## 2025-07-14 NOTE — MEDICATION SCRIBE - ADMISSION MEDICATION HISTORY
Medication Scribe Admission Medication History    Admission medication history is complete. The information provided in this note is only as accurate as the sources available at the time of the update.    Information Source(s): Patient and CareEverywhere/SureScripts via in-person    Pertinent Information:   Patient manages own medications. He only uses prescription eye drops and denies any regular use of OTC's.    Changes made to PTA medication list:  Added: None  Deleted: None  Changed: None    Allergies reviewed with patient and updates made in EHR: yes    Medication History Completed By: Katheryn Berg 7/14/2025 9:32 AM    PTA Med List   Medication Sig Last Dose/Taking    timolol maleate (TIMOPTIC) 0.5 % ophthalmic solution Place 1 drop into the right eye daily. 7/12/2025 Morning

## 2025-07-14 NOTE — PLAN OF CARE
"Goal Outcome Evaluation:    Plan of Care Reviewed With: patient    Overall Patient Progress: no change    Reason for hospital stay: Perforated Diverticulitis  Living situation PTA: Home with wife    Patient A+O x4 - calls appropriately and makes needs known. Gave tylenol and IV dilaudid for complaints of abdominal pain and gas pressure with effective pain management. Up independently in room to bathroom with steady gait. Tolerating clear liquids. Denied any nausea. NS infusing at 100 mL/hr. Second IV access obtained due to multiple ABX and electrolyte replacements. Continues on IV Zosyn, IV Flagyl, and IV Cipro. Wife and family in room visiting on afternoon shift.    Magnesium 1.5 - Replaced per protocol and recheck was 2.3 at 11 PM last night. Was back down to 1.9 this morning and replacement ordered per protocol - awaiting verification from pharmacy.  Potassium 4 yesterday and 3.9 this morning - no replacement indicated.  Phosphorous 2.7 - Replaced per protocol and recheck was unchanged at 2.7 - replacement ordered per protocol - awaiting verification from pharmacy.     Rest of VS and assessment as charted in flow sheets.     Most recent vitals: BP 93/56 (BP Location: Left arm, Patient Position: Supine, Cuff Size: Adult Regular)   Pulse 74   Temp 98  F (36.7  C) (Tympanic)   Resp 16   Ht 1.753 m (5' 9\")   Wt 79.4 kg (175 lb 0.7 oz)   SpO2 93%   BMI 25.85 kg/m        Face to face report given with opportunity to observe patient.    Report given to Lindsey Yu RN   7/14/2025  7:08 AM    "

## 2025-07-14 NOTE — PROGRESS NOTES
INPATIENT ROUNDING NOTE  7/14/2025    Patient: Samy Lu    Physician of Record: Taiwo Briceno MD    Admitting diagnosis: Diverticulitis of colon with perforation [K57.20]    Current Diet: Clear liquids    CURRENT MEDICATIONS:  Continuous Medications:  Current Facility-Administered Medications   Medication Dose Route Frequency Provider Last Rate Last Admin    acetaminophen (TYLENOL) tablet 650 mg  650 mg Oral Q4H PRN Taiwo Briceno MD   650 mg at 07/14/25 0857    Or    acetaminophen (TYLENOL) Suppository 650 mg  650 mg Rectal Q4H PRN Taiwo Briceno MD        ciprofloxacin (CIPRO) infusion 400 mg  400 mg Intravenous Q12H Taiwo Briceno MD   400 mg at 07/14/25 0628    famotidine (PEPCID) injection 20 mg  20 mg Intravenous Q12H Taiwo Briceno MD   20 mg at 07/14/25 1151    HYDROmorphone (DILAUDID) injection 0.2 mg  0.2 mg Intravenous Q2H PRN Taiwo Briceno MD   0.2 mg at 07/13/25 1252    HYDROmorphone (DILAUDID) injection 0.4 mg  0.4 mg Intravenous Q2H PRN Taiwo Briceno MD   0.4 mg at 07/14/25 0848    lidocaine (LMX4) cream   Topical Q1H PRN Taiwo Briceno MD        lidocaine 1 % 0.1-1 mL  0.1-1 mL Other Q1H PRTaiwo Suarez MD        metroNIDAZOLE (FLAGYL) infusion 500 mg  500 mg Intravenous Q12H Taiwo Briceno MD        naloxone (NARCAN) injection 0.2 mg  0.2 mg Intravenous Q2 Min PRTaiwo Suarez MD        Or    naloxone (NARCAN) injection 0.4 mg  0.4 mg Intravenous Q2 Min PRTaiwo Suarez MD        Or    naloxone (NARCAN) injection 0.2 mg  0.2 mg Intramuscular Q2 Min PRTaiwo Suarez MD        Or    naloxone (NARCAN) injection 0.4 mg  0.4 mg Intramuscular Q2 Min PRTaiwo Suarez MD        ondansetron (ZOFRAN ODT) ODT tab 4 mg  4 mg Oral Q6H PRTaiwo Suarez MD        Or    ondansetron (ZOFRAN) injection 4 mg  4 mg Intravenous Q6H PRN Taiwo Briceno MD         piperacillin-tazobactam (ZOSYN) 3.375 g vial to attach to  mL bag  3.375 g Intravenous Q6H Taiwo Briceno MD   3.375 g at 07/14/25 0858    prochlorperazine (COMPAZINE) injection 5 mg  5 mg Intravenous Q6H PRN Taiwo Briceno MD        Or    prochlorperazine (COMPAZINE) tablet 5 mg  5 mg Oral Q6H PRN Taiwo Briceno MD        sodium chloride (PF) 0.9% PF flush 3 mL  3 mL Intracatheter Q8H CASIMIRO Taiwo Briceno MD        sodium chloride (PF) 0.9% PF flush 3 mL  3 mL Intracatheter q1 min prTaiwo Suarez MD        sodium chloride 0.9 % infusion   Intravenous Continuous Taiwo Briceno  mL/hr at 07/14/25 0302 New Bag at 07/14/25 0302    timolol maleate (TIMOPTIC) 0.5 % ophthalmic solution 1 drop  1 drop Right Eye Daily Taiwo Briceno MD   1 drop at 07/14/25 0858       Scheduled Medications:  Current Facility-Administered Medications   Medication Dose Route Frequency Provider Last Rate Last Admin    acetaminophen (TYLENOL) tablet 650 mg  650 mg Oral Q4H PRN Taiwo Briceno MD   650 mg at 07/14/25 0857    Or    acetaminophen (TYLENOL) Suppository 650 mg  650 mg Rectal Q4H PRN Taiwo Briceno MD        ciprofloxacin (CIPRO) infusion 400 mg  400 mg Intravenous Q12H Taiwo Briceno MD   400 mg at 07/14/25 0628    famotidine (PEPCID) injection 20 mg  20 mg Intravenous Q12H Taiwo Briceno MD   20 mg at 07/14/25 1151    HYDROmorphone (DILAUDID) injection 0.2 mg  0.2 mg Intravenous Q2H PRTaiwo Suarez MD   0.2 mg at 07/13/25 1252    HYDROmorphone (DILAUDID) injection 0.4 mg  0.4 mg Intravenous Q2H PRN Taiwo Briceno MD   0.4 mg at 07/14/25 0848    lidocaine (LMX4) cream   Topical Q1H PRN Taiwo Briceno MD        lidocaine 1 % 0.1-1 mL  0.1-1 mL Other Q1H PRN Taiwo Briceno MD        metroNIDAZOLE (FLAGYL) infusion 500 mg  500 mg Intravenous Q12H Taiwo Briceno MD        naloxone (NARCAN)  injection 0.2 mg  0.2 mg Intravenous Q2 Min PRN Taiwo Briceno MD        Or    naloxone (NARCAN) injection 0.4 mg  0.4 mg Intravenous Q2 Min PRN Taiwo Briceno MD        Or    naloxone (NARCAN) injection 0.2 mg  0.2 mg Intramuscular Q2 Min PRN Taiwo Briceno MD        Or    naloxone (NARCAN) injection 0.4 mg  0.4 mg Intramuscular Q2 Min PRN Taiwo Briceno MD        ondansetron (ZOFRAN ODT) ODT tab 4 mg  4 mg Oral Q6H PRN Taiwo Briceno MD        Or    ondansetron (ZOFRAN) injection 4 mg  4 mg Intravenous Q6H PRN Taiwo Briceno MD        piperacillin-tazobactam (ZOSYN) 3.375 g vial to attach to  mL bag  3.375 g Intravenous Q6H Taiwo Briceno MD   3.375 g at 07/14/25 0858    prochlorperazine (COMPAZINE) injection 5 mg  5 mg Intravenous Q6H PRN Taiwo Briceno MD        Or    prochlorperazine (COMPAZINE) tablet 5 mg  5 mg Oral Q6H PRN Taiwo Briceno MD        sodium chloride (PF) 0.9% PF flush 3 mL  3 mL Intracatheter Q8H CASIMIRO Taiwo Briceno MD        sodium chloride (PF) 0.9% PF flush 3 mL  3 mL Intracatheter q1 min prn Taiwo Briceno MD        sodium chloride 0.9 % infusion   Intravenous Continuous Taiwo Briceno  mL/hr at 07/14/25 0302 New Bag at 07/14/25 0302    timolol maleate (TIMOPTIC) 0.5 % ophthalmic solution 1 drop  1 drop Right Eye Daily Taiwo Briceno MD   1 drop at 07/14/25 0858       PRN Medications:  Current Facility-Administered Medications   Medication Dose Route Frequency Provider Last Rate Last Admin    acetaminophen (TYLENOL) tablet 650 mg  650 mg Oral Q4H PRN Taiwo Briceno MD   650 mg at 07/14/25 0857    Or    acetaminophen (TYLENOL) Suppository 650 mg  650 mg Rectal Q4H PRN Taiwo Briceno MD        ciprofloxacin (CIPRO) infusion 400 mg  400 mg Intravenous Q12H Taiwo Briceno MD   400 mg at 07/14/25 0628    famotidine (PEPCID) injection 20 mg  20 mg Intravenous  Q12H Taiwo Briceno MD   20 mg at 07/14/25 1151    HYDROmorphone (DILAUDID) injection 0.2 mg  0.2 mg Intravenous Q2H PRTaiwo Suarez MD   0.2 mg at 07/13/25 1252    HYDROmorphone (DILAUDID) injection 0.4 mg  0.4 mg Intravenous Q2H PRN Taiwo Briceno MD   0.4 mg at 07/14/25 0848    lidocaine (LMX4) cream   Topical Q1H PRN Taiwo Briceno MD        lidocaine 1 % 0.1-1 mL  0.1-1 mL Other Q1H PRN Taiwo Briceno MD        metroNIDAZOLE (FLAGYL) infusion 500 mg  500 mg Intravenous Q12H Taiwo Briceno MD        naloxone (NARCAN) injection 0.2 mg  0.2 mg Intravenous Q2 Min PRN Taiwo Briceno MD        Or    naloxone (NARCAN) injection 0.4 mg  0.4 mg Intravenous Q2 Min PRTaiwo Suarez MD        Or    naloxone (NARCAN) injection 0.2 mg  0.2 mg Intramuscular Q2 Min PRTaiwo Suarez MD        Or    naloxone (NARCAN) injection 0.4 mg  0.4 mg Intramuscular Q2 Min PRTaiwo Suarez MD        ondansetron (ZOFRAN ODT) ODT tab 4 mg  4 mg Oral Q6H PRN Taiwo Briceno MD        Or    ondansetron (ZOFRAN) injection 4 mg  4 mg Intravenous Q6H PRTaiwo Suarez MD        piperacillin-tazobactam (ZOSYN) 3.375 g vial to attach to  mL bag  3.375 g Intravenous Q6H Taiwo Briceno MD   3.375 g at 07/14/25 0858    prochlorperazine (COMPAZINE) injection 5 mg  5 mg Intravenous Q6H PRTaiwo Suarez MD        Or    prochlorperazine (COMPAZINE) tablet 5 mg  5 mg Oral Q6H PRTaiwo Suarez MD        sodium chloride (PF) 0.9% PF flush 3 mL  3 mL Intracatheter Q8H [facility] Taiwo Briceno MD        sodium chloride (PF) 0.9% PF flush 3 mL  3 mL Intracatheter q1 min prn Taiwo Briceno MD        sodium chloride 0.9 % infusion   Intravenous Continuous Taiwo Briceno  mL/hr at 07/14/25 0302 New Bag at 07/14/25 0302    timolol maleate (TIMOPTIC) 0.5 % ophthalmic solution 1 drop  1 drop Right Eye Daily  "Taiwo Briceno MD   1 drop at 07/14/25 0858       SUBJECTIVE:   Nausea: No. Vomiting: No. Fever: No. Chills: No. Excessive burping: No. Flatus: No. BM: No. Pain is 6/10. Pain control: fair. Tolerating current diet: Yes.  States that he feels somewhat better today.    PHYSICAL EXAM:   Vital signs: /62 (BP Location: Left arm, Patient Position: Supine, Cuff Size: Adult Regular)   Pulse 77   Temp 99.7  F (37.6  C) (Tympanic)   Resp 18   Ht 1.753 m (5' 9\")   Wt 79.4 kg (175 lb 0.7 oz)   SpO2 96%   BMI 25.85 kg/m     Weight: [unfilled]   BMI: Body mass index is 25.85 kg/m .   General: Normal, healthy, cooperative, in no acute distress, alert   HEENT: PERRLA and EOMI   Neck: supple   Lungs: clear to auscultation   CV: Regular rate and rhythm without murmer   Abdominal: Soft.  Diffusely tender.  No peritoneal signs.   Extremities: No cyanosis, clubbing or edema noted bilaterally in Upper and Lower Extremities   Neurological: without deficit    INPUT/OUTPUT:      Intake/Output Summary (Last 24 hours) at 7/14/2025 1324  Last data filed at 7/14/2025 0741  Gross per 24 hour   Intake 3074 ml   Output 300 ml   Net 2774 ml       I/O last 3 completed shifts:  In: 3074 [I.V.:3074]  Out: 150 [Urine:150]    LABS:    Last CBC Rrsults:   Recent Labs   Lab Test 07/14/25  0508 07/13/25  0505 04/02/25  1012   WBC 21.9* 20.1* 18.6*   RBC 4.95 5.50 5.43   HGB 16.3 18.1* 18.1*   HCT 44.7 49.8 50.1   MCV 90 91 92   MCH 32.9 32.9 33.3*   MCHC 36.5 36.3 36.1   RDW 13.0 12.9 12.7    208 225       Last Comprehensive Metabolic panel:  Recent Labs   Lab Test 07/14/25  0508 07/13/25  0505 04/17/25  1142   * 137 137   POTASSIUM 3.9 4.0 3.9   CHLORIDE 101 102 98   CO2 19* 21* 28   ANIONGAP 13 14 11   * 135* 115*   BUN 10.1 10.5 13.5   CR 1.07 1.01 1.04   GFRESTIMATED 75 80 78   NURIS 7.9* 8.9 9.1   BILITOTAL 2.6* 0.9 0.5   ALKPHOS 48 51 58   ALT 43 17 19   AST 55* 19 20       Recent Labs   Lab Test 07/14/25  0508 " 07/13/25  2301 07/13/25  1450 07/13/25  0505 04/17/25  1142   MAG 1.9 2.3 1.5*  --   --    ALBUMIN 3.2*  --   --  4.0 4.2   PHOS 2.7  --  2.7  --   --      ASSESSMENT:    Diverticulitis.  Mild improvement over night.  Elevated bilirubin    PLAN:   Diverticulitis: Will going continue antibiotics.  If the patient not improve in the next 24 hours we will repeat his CAT scan to assess for worsening and/or abscess formation.  Elevated bilirubin.  Did get a ultrasound of his gallbladder this morning.  Ultrasound showed questionable stone but there was no shadowing.  It was distended with no pericholecystic fluid.  Because of the questionable stone we will go ahead and get an MRCP as soon as we can.  He is on antibiotics which should cover for acute cholecystitis.

## 2025-07-14 NOTE — PROGRESS NOTES
Medical record and Jose L Score reviewed. Participated in interdisciplinary rounds.  No apparent needs noted at this time. Care Transitions will remain available if needs arise.    BRANDY Little @[phone] July 14, 2025

## 2025-07-14 NOTE — PLAN OF CARE
"Goal Outcome Evaluation:      Plan of Care Reviewed With: patient      Overall Patient Progress: no changeOverall Patient Progress: no change    Pt is A & O X4. C/O severe pain this shift, pt states he felt like something tore when he moved earlier in his abdomen\". Pt received prn pain medication Diluadid X2 this shift, pt states still in pain upon moving. Heat et cold both provided for comfort. Family in today to visit. Pt went down for MRI. VS et assessments as charted. Pt up to the bathroom , steady on feet. Pt received IV Zosyn, Magnesium et Phosphorous replacement this shift. Pt has PIV X2 w/one infusing NS at 100 ml/hr. Gripper socks on, call light within reach.    Face to face report given with opportunity to observe patient.    Report given to JACKIE Hooker RN   7/14/2025  3:30 PM           "

## 2025-07-15 ENCOUNTER — ANESTHESIA EVENT (OUTPATIENT)
Dept: SURGERY | Facility: HOSPITAL | Age: 70
End: 2025-07-15
Payer: MEDICARE

## 2025-07-15 ENCOUNTER — APPOINTMENT (OUTPATIENT)
Dept: ULTRASOUND IMAGING | Facility: HOSPITAL | Age: 70
End: 2025-07-15
Attending: NURSE ANESTHETIST, CERTIFIED REGISTERED
Payer: MEDICARE

## 2025-07-15 ENCOUNTER — ANESTHESIA (OUTPATIENT)
Dept: SURGERY | Facility: HOSPITAL | Age: 70
End: 2025-07-15
Payer: MEDICARE

## 2025-07-15 ENCOUNTER — APPOINTMENT (OUTPATIENT)
Dept: CT IMAGING | Facility: HOSPITAL | Age: 70
End: 2025-07-15
Attending: SURGERY
Payer: MEDICARE

## 2025-07-15 PROBLEM — K63.1 PERFORATED BOWEL (H): Status: ACTIVE | Noted: 2025-07-15

## 2025-07-15 LAB
ABO + RH BLD: NORMAL
ALBUMIN SERPL BCG-MCNC: 3.1 G/DL (ref 3.5–5.2)
ALP SERPL-CCNC: 74 U/L (ref 40–150)
ALT SERPL W P-5'-P-CCNC: 43 U/L (ref 0–70)
ANION GAP SERPL CALCULATED.3IONS-SCNC: 15 MMOL/L (ref 7–15)
AST SERPL W P-5'-P-CCNC: 35 U/L (ref 0–45)
BILIRUB SERPL-MCNC: 3.9 MG/DL
BLD GP AB SCN SERPL QL: NEGATIVE
BUN SERPL-MCNC: 9.7 MG/DL (ref 8–23)
CALCIUM SERPL-MCNC: 8 MG/DL (ref 8.8–10.4)
CHLORIDE SERPL-SCNC: 99 MMOL/L (ref 98–107)
CREAT SERPL-MCNC: 0.93 MG/DL (ref 0.67–1.17)
EGFRCR SERPLBLD CKD-EPI 2021: 88 ML/MIN/1.73M2
ERYTHROCYTE [DISTWIDTH] IN BLOOD BY AUTOMATED COUNT: 13.1 % (ref 10–15)
GLUCOSE BLDC GLUCOMTR-MCNC: 111 MG/DL (ref 70–99)
GLUCOSE SERPL-MCNC: 107 MG/DL (ref 70–99)
HCO3 SERPL-SCNC: 18 MMOL/L (ref 22–29)
HCT VFR BLD AUTO: 43.9 % (ref 40–53)
HGB BLD-MCNC: 16 G/DL (ref 13.3–17.7)
HOLD SPECIMEN: NORMAL
MAGNESIUM SERPL-MCNC: 1.5 MG/DL (ref 1.7–2.3)
MAGNESIUM SERPL-MCNC: 2.1 MG/DL (ref 1.7–2.3)
MCH RBC QN AUTO: 32.8 PG (ref 26.5–33)
MCHC RBC AUTO-ENTMCNC: 36.4 G/DL (ref 31.5–36.5)
MCV RBC AUTO: 90 FL (ref 78–100)
PHOSPHATE SERPL-MCNC: 1.9 MG/DL (ref 2.5–4.5)
PHOSPHATE SERPL-MCNC: 2.9 MG/DL (ref 2.5–4.5)
PHOSPHATE SERPL-MCNC: 3.6 MG/DL (ref 2.5–4.5)
PLATELET # BLD AUTO: 165 10E3/UL (ref 150–450)
POTASSIUM SERPL-SCNC: 3.6 MMOL/L (ref 3.4–5.3)
PROT SERPL-MCNC: 5.7 G/DL (ref 6.4–8.3)
RBC # BLD AUTO: 4.88 10E6/UL (ref 4.4–5.9)
SODIUM SERPL-SCNC: 132 MMOL/L (ref 135–145)
SPECIMEN EXP DATE BLD: NORMAL
WBC # BLD AUTO: 19.9 10E3/UL (ref 4–11)

## 2025-07-15 PROCEDURE — 272N000001 HC OR GENERAL SUPPLY STERILE: Performed by: SURGERY

## 2025-07-15 PROCEDURE — 250N000009 HC RX 250: Performed by: SURGERY

## 2025-07-15 PROCEDURE — 88307 TISSUE EXAM BY PATHOLOGIST: CPT | Mod: 26 | Performed by: PATHOLOGY

## 2025-07-15 PROCEDURE — 44143 PARTIAL REMOVAL OF COLON: CPT | Performed by: SURGERY

## 2025-07-15 PROCEDURE — 360N000077 HC SURGERY LEVEL 4, PER MIN: Performed by: SURGERY

## 2025-07-15 PROCEDURE — 710N000010 HC RECOVERY PHASE 1, LEVEL 2, PER MIN: Performed by: SURGERY

## 2025-07-15 PROCEDURE — 88307 TISSUE EXAM BY PATHOLOGIST: CPT | Mod: TC | Performed by: SURGERY

## 2025-07-15 PROCEDURE — 71260 CT THORAX DX C+: CPT

## 2025-07-15 PROCEDURE — 84100 ASSAY OF PHOSPHORUS: CPT | Performed by: SURGERY

## 2025-07-15 PROCEDURE — 250N000011 HC RX IP 250 OP 636: Performed by: NURSE ANESTHETIST, CERTIFIED REGISTERED

## 2025-07-15 PROCEDURE — 250N000011 HC RX IP 250 OP 636: Performed by: SURGERY

## 2025-07-15 PROCEDURE — 83735 ASSAY OF MAGNESIUM: CPT | Performed by: SURGERY

## 2025-07-15 PROCEDURE — 250N000013 HC RX MED GY IP 250 OP 250 PS 637: Performed by: SURGERY

## 2025-07-15 PROCEDURE — 999N000157 HC STATISTIC RCP TIME EA 10 MIN

## 2025-07-15 PROCEDURE — 44139 MOBILIZATION OF COLON: CPT | Performed by: SURGERY

## 2025-07-15 PROCEDURE — 82947 ASSAY GLUCOSE BLOOD QUANT: CPT | Performed by: SURGERY

## 2025-07-15 PROCEDURE — 86900 BLOOD TYPING SEROLOGIC ABO: CPT | Performed by: NURSE ANESTHETIST, CERTIFIED REGISTERED

## 2025-07-15 PROCEDURE — 85014 HEMATOCRIT: CPT | Performed by: SURGERY

## 2025-07-15 PROCEDURE — 99222 1ST HOSP IP/OBS MODERATE 55: CPT | Mod: 24 | Performed by: INTERNAL MEDICINE

## 2025-07-15 PROCEDURE — 999N000141 HC STATISTIC PRE-PROCEDURE NURSING ASSESSMENT: Performed by: SURGERY

## 2025-07-15 PROCEDURE — 370N000017 HC ANESTHESIA TECHNICAL FEE, PER MIN: Performed by: SURGERY

## 2025-07-15 PROCEDURE — 258N000003 HC RX IP 258 OP 636: Performed by: SURGERY

## 2025-07-15 PROCEDURE — 258N000003 HC RX IP 258 OP 636: Performed by: NURSE ANESTHETIST, CERTIFIED REGISTERED

## 2025-07-15 PROCEDURE — 200N000001 HC R&B ICU

## 2025-07-15 PROCEDURE — 250N000025 HC SEVOFLURANE, PER MIN: Performed by: SURGERY

## 2025-07-15 PROCEDURE — 0D1E0ZE BYPASS LARGE INTESTINE TO LARGE INTESTINE, OPEN APPROACH: ICD-10-PCS | Performed by: SURGERY

## 2025-07-15 PROCEDURE — 36415 COLL VENOUS BLD VENIPUNCTURE: CPT | Performed by: SURGERY

## 2025-07-15 PROCEDURE — 250N000011 HC RX IP 250 OP 636: Performed by: RADIOLOGY

## 2025-07-15 PROCEDURE — 71260 CT THORAX DX C+: CPT | Mod: 26 | Performed by: RADIOLOGY

## 2025-07-15 PROCEDURE — 0DTN0ZZ RESECTION OF SIGMOID COLON, OPEN APPROACH: ICD-10-PCS | Performed by: SURGERY

## 2025-07-15 PROCEDURE — 0DBU0ZZ EXCISION OF OMENTUM, OPEN APPROACH: ICD-10-PCS | Performed by: SURGERY

## 2025-07-15 PROCEDURE — 250N000009 HC RX 250: Performed by: NURSE ANESTHETIST, CERTIFIED REGISTERED

## 2025-07-15 PROCEDURE — 74177 CT ABD & PELVIS W/CONTRAST: CPT | Mod: 26 | Performed by: RADIOLOGY

## 2025-07-15 PROCEDURE — 250N000011 HC RX IP 250 OP 636

## 2025-07-15 RX ORDER — ONDANSETRON 4 MG/1
4 TABLET, ORALLY DISINTEGRATING ORAL EVERY 30 MIN PRN
Status: DISCONTINUED | OUTPATIENT
Start: 2025-07-15 | End: 2025-07-16

## 2025-07-15 RX ORDER — POTASSIUM CHLORIDE 20MEQ/15ML
20 LIQUID (ML) ORAL ONCE
Status: COMPLETED | OUTPATIENT
Start: 2025-07-15 | End: 2025-07-15

## 2025-07-15 RX ORDER — LIDOCAINE 40 MG/G
CREAM TOPICAL
Status: DISCONTINUED | OUTPATIENT
Start: 2025-07-15 | End: 2025-07-21 | Stop reason: HOSPADM

## 2025-07-15 RX ORDER — DEXAMETHASONE SODIUM PHOSPHATE 4 MG/ML
INJECTION, SOLUTION INTRA-ARTICULAR; INTRALESIONAL; INTRAMUSCULAR; INTRAVENOUS; SOFT TISSUE PRN
Status: DISCONTINUED | OUTPATIENT
Start: 2025-07-15 | End: 2025-07-15

## 2025-07-15 RX ORDER — EPHEDRINE SULFATE 50 MG/ML
INJECTION, SOLUTION INTRAMUSCULAR; INTRAVENOUS; SUBCUTANEOUS PRN
Status: DISCONTINUED | OUTPATIENT
Start: 2025-07-15 | End: 2025-07-15

## 2025-07-15 RX ORDER — ALBUTEROL SULFATE 0.83 MG/ML
2.5 SOLUTION RESPIRATORY (INHALATION) EVERY 4 HOURS PRN
Status: DISCONTINUED | OUTPATIENT
Start: 2025-07-15 | End: 2025-07-15 | Stop reason: HOSPADM

## 2025-07-15 RX ORDER — FENTANYL CITRATE 50 UG/ML
INJECTION, SOLUTION INTRAMUSCULAR; INTRAVENOUS PRN
Status: DISCONTINUED | OUTPATIENT
Start: 2025-07-15 | End: 2025-07-15

## 2025-07-15 RX ORDER — FENTANYL CITRATE 50 UG/ML
50 INJECTION, SOLUTION INTRAMUSCULAR; INTRAVENOUS EVERY 5 MIN PRN
Status: DISCONTINUED | OUTPATIENT
Start: 2025-07-15 | End: 2025-07-15 | Stop reason: HOSPADM

## 2025-07-15 RX ORDER — ACETAMINOPHEN 325 MG/1
975 TABLET ORAL EVERY 8 HOURS
Status: DISCONTINUED | OUTPATIENT
Start: 2025-07-15 | End: 2025-07-16

## 2025-07-15 RX ORDER — SODIUM CHLORIDE, SODIUM LACTATE, POTASSIUM CHLORIDE, CALCIUM CHLORIDE 600; 310; 30; 20 MG/100ML; MG/100ML; MG/100ML; MG/100ML
INJECTION, SOLUTION INTRAVENOUS CONTINUOUS
Status: DISCONTINUED | OUTPATIENT
Start: 2025-07-15 | End: 2025-07-15 | Stop reason: HOSPADM

## 2025-07-15 RX ORDER — DEXAMETHASONE SODIUM PHOSPHATE 4 MG/ML
4 INJECTION, SOLUTION INTRA-ARTICULAR; INTRALESIONAL; INTRAMUSCULAR; INTRAVENOUS; SOFT TISSUE
Status: DISCONTINUED | OUTPATIENT
Start: 2025-07-15 | End: 2025-07-16

## 2025-07-15 RX ORDER — POTASSIUM CHLORIDE 7.45 MG/ML
10 INJECTION INTRAVENOUS
Status: COMPLETED | OUTPATIENT
Start: 2025-07-15 | End: 2025-07-15

## 2025-07-15 RX ORDER — KETAMINE HYDROCHLORIDE 10 MG/ML
INJECTION INTRAMUSCULAR; INTRAVENOUS PRN
Status: DISCONTINUED | OUTPATIENT
Start: 2025-07-15 | End: 2025-07-15

## 2025-07-15 RX ORDER — IOPAMIDOL 755 MG/ML
85 INJECTION, SOLUTION INTRAVASCULAR ONCE
Status: COMPLETED | OUTPATIENT
Start: 2025-07-15 | End: 2025-07-15

## 2025-07-15 RX ORDER — HYDROMORPHONE HYDROCHLORIDE 1 MG/ML
0.5 INJECTION, SOLUTION INTRAMUSCULAR; INTRAVENOUS; SUBCUTANEOUS
Status: DISCONTINUED | OUTPATIENT
Start: 2025-07-15 | End: 2025-07-19

## 2025-07-15 RX ORDER — HYDROXYZINE HYDROCHLORIDE 50 MG/ML
50 INJECTION, SOLUTION INTRAMUSCULAR
Status: COMPLETED | OUTPATIENT
Start: 2025-07-15 | End: 2025-07-15

## 2025-07-15 RX ORDER — NALOXONE HYDROCHLORIDE 0.4 MG/ML
0.1 INJECTION, SOLUTION INTRAMUSCULAR; INTRAVENOUS; SUBCUTANEOUS
Status: DISCONTINUED | OUTPATIENT
Start: 2025-07-15 | End: 2025-07-21 | Stop reason: HOSPADM

## 2025-07-15 RX ORDER — LIDOCAINE 40 MG/G
CREAM TOPICAL
Status: DISCONTINUED | OUTPATIENT
Start: 2025-07-15 | End: 2025-07-15 | Stop reason: HOSPADM

## 2025-07-15 RX ORDER — PROCHLORPERAZINE MALEATE 5 MG/1
5 TABLET ORAL EVERY 6 HOURS PRN
Status: DISCONTINUED | OUTPATIENT
Start: 2025-07-15 | End: 2025-07-16

## 2025-07-15 RX ORDER — SODIUM CHLORIDE, SODIUM LACTATE, POTASSIUM CHLORIDE, CALCIUM CHLORIDE 600; 310; 30; 20 MG/100ML; MG/100ML; MG/100ML; MG/100ML
INJECTION, SOLUTION INTRAVENOUS CONTINUOUS PRN
Status: DISCONTINUED | OUTPATIENT
Start: 2025-07-15 | End: 2025-07-15

## 2025-07-15 RX ORDER — BUPIVACAINE HYDROCHLORIDE 2.5 MG/ML
INJECTION, SOLUTION EPIDURAL; INFILTRATION; INTRACAUDAL; PERINEURAL
Status: COMPLETED | OUTPATIENT
Start: 2025-07-15 | End: 2025-07-15

## 2025-07-15 RX ORDER — DEXAMETHASONE SODIUM PHOSPHATE 4 MG/ML
4 INJECTION, SOLUTION INTRA-ARTICULAR; INTRALESIONAL; INTRAMUSCULAR; INTRAVENOUS; SOFT TISSUE
Status: DISCONTINUED | OUTPATIENT
Start: 2025-07-15 | End: 2025-07-15 | Stop reason: HOSPADM

## 2025-07-15 RX ORDER — ONDANSETRON 2 MG/ML
INJECTION INTRAMUSCULAR; INTRAVENOUS PRN
Status: DISCONTINUED | OUTPATIENT
Start: 2025-07-15 | End: 2025-07-15

## 2025-07-15 RX ORDER — LIDOCAINE HYDROCHLORIDE 20 MG/ML
INJECTION, SOLUTION INFILTRATION; PERINEURAL PRN
Status: DISCONTINUED | OUTPATIENT
Start: 2025-07-15 | End: 2025-07-15

## 2025-07-15 RX ORDER — ONDANSETRON 2 MG/ML
4 INJECTION INTRAMUSCULAR; INTRAVENOUS EVERY 30 MIN PRN
Status: DISCONTINUED | OUTPATIENT
Start: 2025-07-15 | End: 2025-07-15 | Stop reason: HOSPADM

## 2025-07-15 RX ORDER — ONDANSETRON 2 MG/ML
4 INJECTION INTRAMUSCULAR; INTRAVENOUS EVERY 30 MIN PRN
Status: DISCONTINUED | OUTPATIENT
Start: 2025-07-15 | End: 2025-07-16

## 2025-07-15 RX ORDER — HYDROMORPHONE HYDROCHLORIDE 1 MG/ML
0.4 INJECTION, SOLUTION INTRAMUSCULAR; INTRAVENOUS; SUBCUTANEOUS EVERY 5 MIN PRN
Status: DISCONTINUED | OUTPATIENT
Start: 2025-07-15 | End: 2025-07-15 | Stop reason: HOSPADM

## 2025-07-15 RX ORDER — FENTANYL CITRATE 50 UG/ML
25 INJECTION, SOLUTION INTRAMUSCULAR; INTRAVENOUS EVERY 5 MIN PRN
Status: DISCONTINUED | OUTPATIENT
Start: 2025-07-15 | End: 2025-07-15 | Stop reason: HOSPADM

## 2025-07-15 RX ORDER — PROPOFOL 10 MG/ML
INJECTION, EMULSION INTRAVENOUS PRN
Status: DISCONTINUED | OUTPATIENT
Start: 2025-07-15 | End: 2025-07-15

## 2025-07-15 RX ORDER — SODIUM CHLORIDE, SODIUM LACTATE, POTASSIUM CHLORIDE, CALCIUM CHLORIDE 600; 310; 30; 20 MG/100ML; MG/100ML; MG/100ML; MG/100ML
INJECTION, SOLUTION INTRAVENOUS CONTINUOUS
Status: DISCONTINUED | OUTPATIENT
Start: 2025-07-15 | End: 2025-07-19

## 2025-07-15 RX ORDER — ONDANSETRON 4 MG/1
4 TABLET, ORALLY DISINTEGRATING ORAL EVERY 30 MIN PRN
Status: DISCONTINUED | OUTPATIENT
Start: 2025-07-15 | End: 2025-07-15 | Stop reason: HOSPADM

## 2025-07-15 RX ORDER — LABETALOL HYDROCHLORIDE 5 MG/ML
10 INJECTION, SOLUTION INTRAVENOUS
Status: DISCONTINUED | OUTPATIENT
Start: 2025-07-15 | End: 2025-07-15 | Stop reason: HOSPADM

## 2025-07-15 RX ORDER — NALOXONE HYDROCHLORIDE 0.4 MG/ML
0.1 INJECTION, SOLUTION INTRAMUSCULAR; INTRAVENOUS; SUBCUTANEOUS
Status: DISCONTINUED | OUTPATIENT
Start: 2025-07-15 | End: 2025-07-15 | Stop reason: HOSPADM

## 2025-07-15 RX ORDER — HYDRALAZINE HYDROCHLORIDE 20 MG/ML
2.5-5 INJECTION INTRAMUSCULAR; INTRAVENOUS EVERY 10 MIN PRN
Status: DISCONTINUED | OUTPATIENT
Start: 2025-07-15 | End: 2025-07-15 | Stop reason: HOSPADM

## 2025-07-15 RX ORDER — FAMOTIDINE 20 MG/1
20 TABLET, FILM COATED ORAL 2 TIMES DAILY
Status: DISCONTINUED | OUTPATIENT
Start: 2025-07-15 | End: 2025-07-16

## 2025-07-15 RX ORDER — ONDANSETRON 2 MG/ML
4 INJECTION INTRAMUSCULAR; INTRAVENOUS EVERY 6 HOURS PRN
Status: DISCONTINUED | OUTPATIENT
Start: 2025-07-15 | End: 2025-07-21 | Stop reason: HOSPADM

## 2025-07-15 RX ORDER — HYDROMORPHONE HYDROCHLORIDE 1 MG/ML
0.2 INJECTION, SOLUTION INTRAMUSCULAR; INTRAVENOUS; SUBCUTANEOUS EVERY 5 MIN PRN
Status: DISCONTINUED | OUTPATIENT
Start: 2025-07-15 | End: 2025-07-15 | Stop reason: HOSPADM

## 2025-07-15 RX ORDER — DEXMEDETOMIDINE HYDROCHLORIDE 4 UG/ML
INJECTION, SOLUTION INTRAVENOUS PRN
Status: DISCONTINUED | OUTPATIENT
Start: 2025-07-15 | End: 2025-07-15

## 2025-07-15 RX ORDER — ONDANSETRON 4 MG/1
4 TABLET, ORALLY DISINTEGRATING ORAL EVERY 6 HOURS PRN
Status: DISCONTINUED | OUTPATIENT
Start: 2025-07-15 | End: 2025-07-21 | Stop reason: HOSPADM

## 2025-07-15 RX ORDER — MAGNESIUM SULFATE HEPTAHYDRATE 40 MG/ML
4 INJECTION, SOLUTION INTRAVENOUS ONCE
Status: COMPLETED | OUTPATIENT
Start: 2025-07-15 | End: 2025-07-15

## 2025-07-15 RX ADMIN — FENTANYL CITRATE 75 MCG: 50 INJECTION INTRAMUSCULAR; INTRAVENOUS at 12:31

## 2025-07-15 RX ADMIN — ONDANSETRON 4 MG: 2 INJECTION INTRAMUSCULAR; INTRAVENOUS at 14:10

## 2025-07-15 RX ADMIN — Medication 10 MG: at 12:47

## 2025-07-15 RX ADMIN — HYDROMORPHONE HYDROCHLORIDE 0.8 MG: 1 INJECTION, SOLUTION INTRAMUSCULAR; INTRAVENOUS; SUBCUTANEOUS at 16:50

## 2025-07-15 RX ADMIN — BUPIVACAINE HYDROCHLORIDE 15 ML: 2.5 INJECTION, SOLUTION EPIDURAL; INFILTRATION; INTRACAUDAL at 12:16

## 2025-07-15 RX ADMIN — DEXAMETHASONE SODIUM PHOSPHATE 10 MG: 4 INJECTION, SOLUTION INTRA-ARTICULAR; INTRALESIONAL; INTRAMUSCULAR; INTRAVENOUS; SOFT TISSUE at 13:02

## 2025-07-15 RX ADMIN — PIPERACILLIN AND TAZOBACTAM 3.38 G: 3; .375 INJECTION, POWDER, FOR SOLUTION INTRAVENOUS at 02:36

## 2025-07-15 RX ADMIN — POTASSIUM CHLORIDE 10 MEQ: 7.46 INJECTION, SOLUTION INTRAVENOUS at 18:54

## 2025-07-15 RX ADMIN — ROCURONIUM BROMIDE 50 MG: 10 INJECTION INTRAVENOUS at 12:43

## 2025-07-15 RX ADMIN — FAMOTIDINE 20 MG: 10 INJECTION, SOLUTION INTRAVENOUS at 10:35

## 2025-07-15 RX ADMIN — FENTANYL CITRATE 25 MCG: 50 INJECTION INTRAMUSCULAR; INTRAVENOUS at 14:37

## 2025-07-15 RX ADMIN — Medication 20 MG: at 13:00

## 2025-07-15 RX ADMIN — DEXMEDETOMIDINE HYDROCHLORIDE 4 MCG: 4 INJECTION, SOLUTION INTRAVENOUS at 14:41

## 2025-07-15 RX ADMIN — HYDROMORPHONE HYDROCHLORIDE 0.8 MG: 1 INJECTION, SOLUTION INTRAMUSCULAR; INTRAVENOUS; SUBCUTANEOUS at 21:10

## 2025-07-15 RX ADMIN — MIDAZOLAM 2 MG: 1 INJECTION INTRAMUSCULAR; INTRAVENOUS at 12:15

## 2025-07-15 RX ADMIN — MAGNESIUM SULFATE HEPTAHYDRATE 4 G: 40 INJECTION, SOLUTION INTRAVENOUS at 07:00

## 2025-07-15 RX ADMIN — PHENYLEPHRINE HYDROCHLORIDE 100 MCG: 10 INJECTION INTRAVENOUS at 12:48

## 2025-07-15 RX ADMIN — TIMOLOL MALEATE 1 DROP: 5 SOLUTION OPHTHALMIC at 08:03

## 2025-07-15 RX ADMIN — ROCURONIUM BROMIDE 15 MG: 10 INJECTION INTRAVENOUS at 13:45

## 2025-07-15 RX ADMIN — PIPERACILLIN AND TAZOBACTAM 3.38 G: 3; .375 INJECTION, POWDER, FOR SOLUTION INTRAVENOUS at 08:37

## 2025-07-15 RX ADMIN — SODIUM CHLORIDE, POTASSIUM CHLORIDE, SODIUM LACTATE AND CALCIUM CHLORIDE: 600; 310; 30; 20 INJECTION, SOLUTION INTRAVENOUS at 12:08

## 2025-07-15 RX ADMIN — POTASSIUM CHLORIDE 20 MEQ: 20 SOLUTION ORAL at 06:58

## 2025-07-15 RX ADMIN — FENTANYL CITRATE 25 MCG: 50 INJECTION INTRAMUSCULAR; INTRAVENOUS at 13:33

## 2025-07-15 RX ADMIN — Medication 100 MG: at 12:40

## 2025-07-15 RX ADMIN — METRONIDAZOLE 500 MG: 500 INJECTION, SOLUTION INTRAVENOUS at 04:53

## 2025-07-15 RX ADMIN — BUPIVACAINE 10 ML: 13.3 INJECTION, SUSPENSION, LIPOSOMAL INFILTRATION at 12:18

## 2025-07-15 RX ADMIN — POTASSIUM CHLORIDE 10 MEQ: 7.46 INJECTION, SOLUTION INTRAVENOUS at 16:53

## 2025-07-15 RX ADMIN — PROPOFOL 120 MG: 10 INJECTION, EMULSION INTRAVENOUS at 12:40

## 2025-07-15 RX ADMIN — HYDROMORPHONE HYDROCHLORIDE 1 MG: 1 INJECTION, SOLUTION INTRAMUSCULAR; INTRAVENOUS; SUBCUTANEOUS at 04:03

## 2025-07-15 RX ADMIN — FAMOTIDINE 20 MG: 10 INJECTION, SOLUTION INTRAVENOUS at 21:11

## 2025-07-15 RX ADMIN — Medication 200 MG: at 14:33

## 2025-07-15 RX ADMIN — ACETAMINOPHEN 975 MG: 325 TABLET ORAL at 16:55

## 2025-07-15 RX ADMIN — SODIUM CHLORIDE, SODIUM LACTATE, POTASSIUM CHLORIDE, AND CALCIUM CHLORIDE: .6; .31; .03; .02 INJECTION, SOLUTION INTRAVENOUS at 12:00

## 2025-07-15 RX ADMIN — FENTANYL CITRATE 50 MCG: 50 INJECTION, SOLUTION INTRAMUSCULAR; INTRAVENOUS at 15:07

## 2025-07-15 RX ADMIN — SODIUM CHLORIDE, SODIUM LACTATE, POTASSIUM CHLORIDE, AND CALCIUM CHLORIDE: .6; .31; .03; .02 INJECTION, SOLUTION INTRAVENOUS at 13:44

## 2025-07-15 RX ADMIN — BUPIVACAINE 10 ML: 13.3 INJECTION, SUSPENSION, LIPOSOMAL INFILTRATION at 12:16

## 2025-07-15 RX ADMIN — BUPIVACAINE HYDROCHLORIDE 15 ML: 2.5 INJECTION, SOLUTION EPIDURAL; INFILTRATION; INTRACAUDAL at 12:18

## 2025-07-15 RX ADMIN — Medication 30 MG: at 12:40

## 2025-07-15 RX ADMIN — FENTANYL CITRATE 25 MCG: 50 INJECTION INTRAMUSCULAR; INTRAVENOUS at 12:59

## 2025-07-15 RX ADMIN — HYDROMORPHONE HYDROCHLORIDE 1 MG: 1 INJECTION, SOLUTION INTRAMUSCULAR; INTRAVENOUS; SUBCUTANEOUS at 10:31

## 2025-07-15 RX ADMIN — SODIUM CHLORIDE, POTASSIUM CHLORIDE, SODIUM LACTATE AND CALCIUM CHLORIDE: 600; 310; 30; 20 INJECTION, SOLUTION INTRAVENOUS at 16:53

## 2025-07-15 RX ADMIN — ONDANSETRON 4 MG: 2 INJECTION INTRAMUSCULAR; INTRAVENOUS at 15:25

## 2025-07-15 RX ADMIN — FENTANYL CITRATE 25 MCG: 50 INJECTION INTRAMUSCULAR; INTRAVENOUS at 13:45

## 2025-07-15 RX ADMIN — LIDOCAINE HYDROCHLORIDE 40 MG: 20 INJECTION, SOLUTION INFILTRATION; PERINEURAL at 12:40

## 2025-07-15 RX ADMIN — IOPAMIDOL 85 ML: 755 INJECTION, SOLUTION INTRAVENOUS at 09:40

## 2025-07-15 RX ADMIN — SODIUM PHOSPHATE, MONOBASIC, MONOHYDRATE AND SODIUM PHOSPHATE, DIBASIC, ANHYDROUS 15 MMOL: 142; 276 INJECTION, SOLUTION INTRAVENOUS at 10:01

## 2025-07-15 RX ADMIN — HYDROXYZINE HYDROCHLORIDE 50 MG: 50 INJECTION, SOLUTION INTRAMUSCULAR at 15:09

## 2025-07-15 RX ADMIN — CIPROFLOXACIN 400 MG: 2 INJECTION, SOLUTION INTRAVENOUS at 06:13

## 2025-07-15 ASSESSMENT — ACTIVITIES OF DAILY LIVING (ADL)
ADLS_ACUITY_SCORE: 42
ADLS_ACUITY_SCORE: 32
ADLS_ACUITY_SCORE: 42
ADLS_ACUITY_SCORE: 42
ADLS_ACUITY_SCORE: 30
ADLS_ACUITY_SCORE: 24
ADLS_ACUITY_SCORE: 30
ADLS_ACUITY_SCORE: 26
ADLS_ACUITY_SCORE: 42
ADLS_ACUITY_SCORE: 26
ADLS_ACUITY_SCORE: 40
ADLS_ACUITY_SCORE: 32
ADLS_ACUITY_SCORE: 26
ADLS_ACUITY_SCORE: 29
ADLS_ACUITY_SCORE: 42
ADLS_ACUITY_SCORE: 26
ADLS_ACUITY_SCORE: 24
ADLS_ACUITY_SCORE: 24
ADLS_ACUITY_SCORE: 26
ADLS_ACUITY_SCORE: 32
ADLS_ACUITY_SCORE: 42
ADLS_ACUITY_SCORE: 32
ADLS_ACUITY_SCORE: 32

## 2025-07-15 NOTE — ANESTHESIA PREPROCEDURE EVALUATION
Anesthesia Pre-Procedure Evaluation    Patient: Samy Lu   MRN: [id number] : 1955          Procedure : Procedure(s):  LAPAROTOMY         No past medical history on file.   Past Surgical History:   Procedure Laterality Date    ABDOMEN SURGERY      right IHR    ABDOMEN SURGERY      2 hernia repairs    ABDOMEN SURGERY      Umbilical hernia repair    COLONOSCOPY  10/21/2009    Due     COLONOSCOPY N/A 2018    Procedure: COLONOSCOPY With POLYPECTOMY,RESOLUTION CLIP, BIOPSY;  Surgeon: Jesus Shi MD;  Location: [facility]      No Known Allergies   Social History     Tobacco Use    Smoking status: Former     Current packs/day: 0.00     Average packs/day: 0.3 packs/day for 37.0 years (9.3 ttl pk-yrs)     Types: Cigarettes     Start date: 1969     Quit date: 2006     Years since quittin.5    Smokeless tobacco: Never   Substance Use Topics    Alcohol use: Yes     Comment: rare      Wt Readings from Last 1 Encounters:   25 79.4 kg (175 lb 0.7 oz)        Anesthesia Evaluation   Pt has had prior anesthetic.         ROS/MED HX  ENT/Pulmonary:  - neg pulmonary ROS     Neurologic:  - neg neurologic ROS     Cardiovascular:  - neg cardiovascular ROS     METS/Exercise Tolerance:     Hematologic:  - neg hematologic  ROS     Musculoskeletal:   (+)  arthritis,             GI/Hepatic: Comment: Diverticulitis history    CT   Findings of perforated viscus with increased free abdominal air and  free fluid, increased inflammatory changes in the abdomen and pelvis.  No well-defined abscess.     Borderline dilated proximal small bowel loops, could represent  adynamic ileus or developing small bowel obstruction.     Biliary tree is within normal limits for age.     Pulmonary opacities likely representing pulmonary vascular congestion,  superimposed infection not excluded.      (+)       Inflammatory bowel disease,             Renal/Genitourinary:  - neg Renal ROS     Endo:  - neg endo ROS    "  Psychiatric/Substance Use:     (+)     Recreational drug usage: Cannabis.    Infectious Disease:  - neg infectious disease ROS     Malignancy:       Other:              Physical Exam  Airway  Mallampati: I  Neck ROM: full  Mouth opening: >= 4 cm    Cardiovascular   Rhythm: regular  Rate: normal rate     Dental   (+) Edentulous   dental implants, bridges or caps present   Pulmonary Breath sounds clear to auscultation        Neurological   He appears awake, alert and oriented x3.    Other Findings       OUTSIDE LABS:  CBC:   Lab Results   Component Value Date    WBC 19.9 (H) 07/15/2025    WBC 21.9 (H) 07/14/2025    HGB 16.0 07/15/2025    HGB 16.3 07/14/2025    HCT 43.9 07/15/2025    HCT 44.7 07/14/2025     07/15/2025     07/14/2025     BMP:   Lab Results   Component Value Date     (L) 07/15/2025     (L) 07/14/2025    POTASSIUM 3.6 07/15/2025    POTASSIUM 3.9 07/14/2025    CHLORIDE 99 07/15/2025    CHLORIDE 101 07/14/2025    CO2 18 (L) 07/15/2025    CO2 19 (L) 07/14/2025    BUN 9.7 07/15/2025    BUN 10.1 07/14/2025    CR 0.93 07/15/2025    CR 1.07 07/14/2025     (H) 07/15/2025     (H) 07/14/2025     COAGS:   Lab Results   Component Value Date    INR 1.03 04/14/2023     POC: No results found for: \"BGM\", \"HCG\", \"HCGS\"  HEPATIC:   Lab Results   Component Value Date    ALBUMIN 3.1 (L) 07/15/2025    PROTTOTAL 5.7 (L) 07/15/2025    ALT 43 07/15/2025    AST 35 07/15/2025    ALKPHOS 74 07/15/2025    BILITOTAL 3.9 (H) 07/15/2025     OTHER:   Lab Results   Component Value Date    LACT 1.3 07/13/2025    NURIS 8.0 (L) 07/15/2025    PHOS 1.9 (L) 07/15/2025    MAG 1.5 (L) 07/15/2025    LIPASE 47 07/13/2025    CRP <2.9 09/06/2018    SED 5 03/28/2024       Anesthesia Plan    ASA Status:  3, emergent      NPO Status: NPO Appropriate   Anesthesia Type: General.  Induction: intravenous.  Maintenance: Balanced.   Techniques and Equipment:       - Monitoring Plan: standard ASA monitoring " "    Consents    Anesthesia Plan(s) and associated risks, benefits, and realistic alternatives discussed. Questions answered and patient/representative(s) expressed understanding.     - Discussed: CRNA     - Discussed with:  Patient        - Pt is DNR/DNI Status: no DNR     Blood Consent:      - Discussed with: patient.     - Consented: consented to blood products     Postoperative Care    Pain management: peripheral nerve block.     Comments:                   ANAI Mota CRNA    I have reviewed the pertinent notes and labs in the chart from the past 30 days and (re)examined the patient.  Any updates or changes from those notes are reflected in this note.    Clinically Significant Risk Factors         # Hyponatremia: Lowest Na = 132 mmol/L in last 2 days, will monitor as appropriate     # Hypomagnesemia: Lowest Mg = 1.5 mg/dL in last 2 days, will replace as needed   # Hypoalbuminemia: Lowest albumin = 3.1 g/dL at 7/15/2025  5:28 AM, will monitor as appropriate                # Overweight: Estimated body mass index is 25.85 kg/m  as calculated from the following:    Height as of this encounter: 1.753 m (5' 9\").    Weight as of this encounter: 79.4 kg (175 lb 0.7 oz)., PRESENT ON ADMISSION                  "

## 2025-07-15 NOTE — CONSULTS
"[facility]  Consult Note - Hospitalist Service  Date of Admission:  7/13/2025  Consult Requested by: Dr. CARLOS Briceno  Reason for Consult: SVT    Assessment & Plan   Samy Lu is a 70 year old male admitted on 7/13/2025. He is status post exploratory laparotomy, sigmoid colectomy, takedown of splenic flexure, with Stoner's procedure and end colostomy.  Procedure was done under the care of Dr. Taiwo Briceno.  Procedure completed due to perforated fixed skin is secondary to perforated diverticulitis.  During the surgical procedure patient was said to have a short run of SVT lasting 15 to 20 seconds.  Postoperatively the patient denies any chest pain or shortness of breath.    1) CV: As stated the patient was said to have had a short run of SVT.  He does not have any cardiac history.  He denied any chest pain.  He has had some mild electrolyte abnormalities as of late in the context of his diverticulitis.  At this time only recommendation would be to remain on telemetry overnight and continue to monitor fluid status and electrolytes.     Clinically Significant Risk Factors         # Hyponatremia: Lowest Na = 132 mmol/L in last 2 days, will monitor as appropriate     # Hypomagnesemia: Lowest Mg = 1.5 mg/dL in last 2 days, will replace as needed   # Hypoalbuminemia: Lowest albumin = 3.1 g/dL at 7/15/2025  5:28 AM, will monitor as appropriate                # Overweight: Estimated body mass index is 25.85 kg/m  as calculated from the following:    Height as of this encounter: 1.753 m (5' 9\").    Weight as of this encounter: 79.4 kg (175 lb 0.7 oz)., PRESENT ON ADMISSION            John Neff DO  Hospitalist Service  Securely message with [employer] (more info)  Text page via [facility] Paging/Directory   ______________________________________________________________________    Chief Complaint   Abdominal Pain    History is obtained from the patient and electronic health record    History of Present " Illness   Samy Lu is a 70 year old male who scented several days ago to our facility due to abdominal pain.  Patient was found to have leukocytosis with an elevation of the white count to 20.1 and had a mild elevation of his CRP to 6.22.  In the subsequent days he was noted to have upward trending bilirubin currently at 3.9.  CT imaging was remarkable for perforated viscus secondary to diverticulosis.  Patient did undergo surgical procedure today due to perforated viscus.  Exploratory laparotomy, sigmoid colectomy, takedown of splenic flexure with Stoner's procedure with end colostomy and washout was completed by Dr. Taiwo Briceno.  During closure it was noted that the patient had 15 to 20 seconds of an SVT type rhythm.  Unfortunately, the this was not captured with a printout and/or EKG.  Leading up to this hospitalization he has had some slight electrolyte abnormalities which have been corrected per protocol including hypophosphatemia, hypomanic and hypokalemia.  Patient is also slightly hyponatremic at 132.  Post operatively the patient seems to be doing well.  He denies any chest pain or shortness of breath.  He does not have any cardiac history and is on no medications.      Past Medical History    History reviewed. No pertinent past medical history.    Past Surgical History   Past Surgical History:   Procedure Laterality Date    ABDOMEN SURGERY      right IHR    ABDOMEN SURGERY      2 hernia repairs    ABDOMEN SURGERY      Umbilical hernia repair    COLONOSCOPY  10/21/2009    Due 2014    COLONOSCOPY N/A 11/19/2018    Procedure: COLONOSCOPY With POLYPECTOMY,RESOLUTION CLIP, BIOPSY;  Surgeon: Jesus Shi MD;  Location: [facility] OR       Medications   I have reviewed this patient's current medications       Review of Systems    Unable.  But does denies chest pain or SOB.    Social History   I have reviewed this patient's social history and updated it with pertinent information if needed.  Social  "History     Tobacco Use    Smoking status: Former     Current packs/day: 0.00     Average packs/day: 0.3 packs/day for 37.0 years (9.3 ttl pk-yrs)     Types: Cigarettes     Start date: 1969     Quit date: 2006     Years since quittin.5    Smokeless tobacco: Never   Vaping Use    Vaping status: Never Used   Substance Use Topics    Alcohol use: Yes     Comment: rare    Drug use: Yes     Types: Marijuana     Comment: \"for glaucoma\"         Family History   I have reviewed this patient's family history and updated it with pertinent information if needed.  Family History   Problem Relation Age of Onset    Cancer Father         Non Hodgkins Lymphoma    Diabetes Paternal Grandmother          Allergies   No Known Allergies     Physical Exam   Vital Signs: Temp: 98.4  F (36.9  C) Temp src: Temporal BP: (!) 140/88 Pulse: 79   Resp: 19 SpO2: 93 % O2 Device: Nasal cannula Oxygen Delivery: 2 LPM  Weight: 175 lbs .72 oz    Constitutional: Awake, lethargic.  Respiratory: CTA bilaterally.  No wheezes or rhonchi.    Cardiovascular: RRR. No murmurs.    GI: Abdomen in dressing with colostomy in place and a drain in the right side of the abdomen.  Bowel sounds are quiet.  Musculoskeletal: no lower extremity pitting edema present  Neurologic: Patient is somnolent following this procedure however seems to be moving all extremities.  Neuropsychiatric: Unable to completely assess at this time due to anesthesia.    Medical Decision Making       60 MINUTES SPENT BY ME on the date of service doing chart review, history, exam, documentation & further activities per the note.      Data     I have personally reviewed the following data over the past 24 hrs:    19.9 (H)  \   16.0   / 165     132 (L) 99 9.7 /  111 (H)   3.6 18 (L) 0.93 \     ALT: 43 AST: 35 AP: 74 TBILI: 3.9 (H)   ALB: 3.1 (L) TOT PROTEIN: 5.7 (L) LIPASE: N/A       Imaging results reviewed over the past 24 hrs:   Recent Results (from the past 24 hours)   MR Abdomen " MRCP without Contrast    Narrative    PROCEDURE: MR ABDOMEN MRCP W/O CONTRAST    INDICATION: elevated bili.  Questionable gallstone.    COMPARISON: CT 11/17/2009, ultrasound 7/14/2025    TECHNIQUE:  Axial and coronal as well as 3-D T2 weighted images of the  abdomen.    MEDS/CONTRAST: None    FINDINGS: Assessment is limited by MRCP only technique.     3 mm dependent gallstone or gallbladder wall polyp.     Dilation of the common bile duct up to 14 mm. No common bile duct  defect/stone. Intrahepatic ductal dilatation without evidence of focal  stricture.      Impression    IMPRESSION:      3 mm gallstone or polyp.    Dilation the common bile duct and intrahepatic ducts without a focal  filling defect to suggest choledocholithiasis.    YASIR ANN MD         SYSTEM ID:  [id number]   CT Chest/Abdomen/Pelvis w Contrast    Narrative    EXAMINATION: CT CHEST/ABDOMEN/PELVIS W CONTRAST, 7/15/2025 9:51 AM    HISTORY: diverticulitis and elevated bili    COMPARISON: MR abdomen 7/14/2025; CT abdomen pelvis 7/13/2025    TECHNIQUE:  Imaging protocol: Computed tomography images of chest, abdomen and  pelvis with intravenous contrast. Intravenous contrast: ISOVUE 370   85mL.  Acquisition: This CT exam was performed using one or more the  following dose reduction techniques: automated exposure control,  adjustment of the mA and/or kV according to patient size, and/or  iterative reconstruction technique.  Processing: 3D rendering on independent workstation using Maximum  Intensity Projection (MIP) was performed and archived to PACS. 3D  reconstructions are interpreted and reported by supervising  radiologist.    FINDINGS:    CHEST:  LUNGS: Central airways are clear. Dense consolidations in the lower  lobes. Moderate centrilobular emphysematous changes. Mild general  bronchial wall thickening. Dependent interlobular septal thickening,  subpleural reticular opacities.  PLEURA: Trace effusions. No pneumothorax.  VESSELS: Trace  calcification of the aorta. No aortic aneurysmal  dilation.  HEART: No cardiomegaly. Mild coronary calcification.  LYMPH NODES: Shotty mediastinal nodes. No pathologically enlarged  nodes.  THYROID: Within normal limits.    ABDOMEN/PELVIS:  LIVER: Normal contour. No suspicious liver lesions. A few scattered  too small to characterize hypodensities, likely cysts or benign  hemangiomas.  GALLBLADDER: No radio-opaque stones. No gallbladder wall thickening.  BILE DUCTS: Prominent intrahepatic ducts without dilatation of the  common duct, measures up to 7 mm, which is within normal limits for  age.  PANCREAS: Within normal limits.  SPLEEN: Within normal limits.  ADRENALS: Within normal limits.    KIDNEYS/URETERS: No soft tissue mass. No hydronephrosis. No  nephrolithiasis.  URINARY BLADDER: Within normal limits.  REPRODUCTIVE ORGANS: No pelvic masses.    BOWEL: Findings of perforated viscus. Marked wall thickening of the  descending colon and proximal sigmoid colon as well as segments of  small bowel in the left abdomen. Oral contrast progresses into the  small bowel. There is borderline dilatation of proximal small bowel  loops, which could represent adynamic ileus or obstruction. Air-filled  dilated appendix without findings of surrounding inflammatory changes.  PERITONEUM/RETROPERITONEUM: Increased volume of free air since  comparison CT. Mesenteric stranding and edema. Small amount of free  fluid with trace ascites in fat-containing left inguinal hernia sac,  free fluid volume has increased since comparison. No well-defined  abdominal abscess. Sequela of prior umbilical hernia repair.  VESSELS: Atherosclerotic calcification of the aorta without aneurysmal  dilation.  LYMPH NODES: Shotty mesenteric and retroperitoneal nodes.    BONES AND SOFT TISSUES:  No suspicious osseous lesions. Degenerative periarticular changes and  spinal spondylosis.      Impression    IMPRESSION:    Findings of perforated viscus with  increased free abdominal air and  free fluid, increased inflammatory changes in the abdomen and pelvis.  No well-defined abscess.    Borderline dilated proximal small bowel loops, could represent  adynamic ileus or developing small bowel obstruction.    Biliary tree is within normal limits for age.    Pulmonary opacities likely representing pulmonary vascular congestion,  superimposed infection not excluded.    STEPHANIA POOLE MD         SYSTEM ID:  [id number]

## 2025-07-15 NOTE — PLAN OF CARE
Goal Outcome Evaluation:  Plan of Care Reviewed With: Patient     Overall Patient Progress:Progressing     Pt A&O, able to make needs known. Assessment and VS as charted in flowsheet. PRN dilaudid for pain management, which was effective. Fluids running at 100 mLs/hr. Pt on magnesium, potassium, and phosphorous RN management replacement. All electrolytes requiring replacement this AM. Call light within reach, non-slip socks on.            Face to face report given with opportunity to observe patient.    Report given to Brayan Finley RN   7/15/2025  7:26 AM

## 2025-07-15 NOTE — ANESTHESIA PROCEDURE NOTES
Airway         Procedure Start/Stop Times: 7/15/2025 12:40 PM  Staff -        CRNA: Nhung Rivera APRN CRNA       Performed By: CRNA  Consent for Airway        Urgency: emergent       Consent: No emergent situation. Verbal consent obtained. Written consent obtained.       Consent given by: patient and spouse       Risks and benefits: risks, benefits and alternatives were discussed       Patient identity confirmed: verbally with patient, arm band and hospital-assigned identification number  Indications and Patient Condition       Indications for airway management: april-procedural       Induction type:RSI       Mask difficulty assessment: 0 - not attempted    Final Airway Details       Final airway type: endotracheal airway       Successful airway: ETT - single  Endotracheal Airway Details        ETT size (mm): 7.5       Cuffed: yes       Successful intubation technique: video laryngoscopy       VL Blade Size: Glidescope 4       Grade View of Cords: 1       Adjucts: stylet       Position: Right       Measured from: lips       Secured at (cm): 22       Bite block used: None    Post intubation assessment        Placement verified by: capnometry, equal breath sounds and chest rise        Number of attempts at approach: 1       Number of other approaches attempted: 0       Secured with: commercial tube segura and tape       Ease of procedure: easy       Dentition: Intact and Unchanged    Medication(s) Administered   Medication Administration Time: 7/15/2025 12:40 PM

## 2025-07-15 NOTE — ANESTHESIA PROCEDURE NOTES
TAP Procedure Note    Pre-Procedure   Staff -        CRNA: Nhung Rivera APRN CRNA       Other Anesthesia Staff: Aubree Chairez       Performed By: CRNA and AISHWARYA       Location: pre-op       Procedure Start/Stop Times: 7/15/2025 12:16 PM and 7/15/2025 12:21 PM       Pre-Anesthestic Checklist: patient identified, IV checked, site marked, risks and benefits discussed, informed consent, monitors and equipment checked, pre-op evaluation, at physician/surgeon's request and post-op pain management  Timeout:       Correct Patient: Yes        Correct Procedure: Yes        Correct Site: Yes        Correct Position: Yes        Correct Laterality: Yes        Site Marked: Yes  Procedure Documentation  Procedure: TAP         Diagnosis: POST-OP PAIN MANAGEMENT       Laterality: bilateral       Patient Position: supine       Skin prep: Chloraprep       Needle Type: insulated and short bevel       Needle Gauge: 20.        Needle Length (Inches): 4        Ultrasound guided       1. Ultrasound was used to identify targeted nerve, plexus, vascular marker, or fascial plane and place a needle adjacent to it in real-time.       2. Ultrasound was used to visualize the spread of anesthetic in close proximity to the above referenced structure.       3. A permanent image is entered into the patient's record.       4. The visualized anatomic structures appeared normal.       5. There were no apparent abnormal pathologic findings.    Assessment/Narrative         The placement was negative for: blood aspirated, painful injection and site bleeding       Paresthesias: No.       Bolus given via needle. no blood aspirated via catheter.        Secured via.        Insertion/Infusion Method: Single Shot       Complications: none       Injection made incrementally with aspirations every 5 mL.    Medication(s) Administered   Bupivacaine 0.25% PF (Infiltration) - Infiltration   15 mL - 7/15/2025 12:16:00 PM   15 mL - 7/15/2025 12:18:00  "PM  Bupivacaine liposome (Exparel) 1.3% LA inj susp (Infiltration) - Infiltration   10 mL - 7/15/2025 12:16:00 PM   10 mL - 7/15/2025 12:18:00 PM  Medication Administration Time: 7/15/2025 12:16 PM      FOR [facility] [place] ONLY:   Pain Team Contact information: please page the Pain Team Via [employer]. Search \"Pain\". During daytime hours, please page the attending first. At night please page the resident first.      "

## 2025-07-15 NOTE — PLAN OF CARE
Goal Outcome Evaluation:      Plan of Care Reviewed With: patient    Overall Patient Progress: no changeOverall Patient Progress: no change  Free from falls/injuries this shift. Did get increased dose of Dilaudid as pt was much more uncomfortable early in shift. Updated Dr Briceno about pts increase in pain. Had 1 mg IV Dilaudid at 1817. Good results obtained. Pt did sleep for a while and woke up with much less pain. Did place O2 after IV Dilaudid given. IVF: NS@ 100. ABTX: Cipro, Flagyl & Zosyn . Temp max this shift: 100.4.     Face to face report given with opportunity to observe patient.    Report given to Lorne Russell RN   7/14/2025  10:54 PM

## 2025-07-15 NOTE — PLAN OF CARE
Plan of Care Reviewed With: Patient and family    Overall Patient Progress: Progressing    Pt transferred back from surgery at approximately 1600. Drowsy but rating pain 10/10. Dilaudid given with relief noted. ALISIA drains in place to bilateral lower quadrants, striped this shift. Colostomy in place with a small amount of bloody drainage noted. PIV x2. 2 riders of K+ given. NG in place to LIS with minimal dark red drainage noted. Starkey patent. Family remians at bedside. Bed is locked and low, call light within reach, pt makes needs known.     Face to face report given with opportunity to observe patient.    Report given to JACKIE Hwang RN   7/15/2025  7:30 PM

## 2025-07-15 NOTE — ANESTHESIA CARE TRANSFER NOTE
Patient: aSmy Lu    Procedure: Procedure(s):  LAPAROTOMY, Takedown Splenic Flexure, Hartmanns Procedure with Creation End Ostomy  COLECTOMY, SIGMOID, OPEN       Diagnosis: Perforated bowel (H) [K63.1]  Diagnosis Additional Information: No value filed.    Anesthesia Type:   General     Note:    Oropharynx: oropharynx clear of all foreign objects and spontaneously breathing  Level of Consciousness: awake and drowsy  Oxygen Supplementation: face mask  Level of Supplemental Oxygen (L/min / FiO2): 8    Dentition: dentition unchanged  Vital Signs Stable: post-procedure vital signs reviewed and stable  Report to RN Given: handoff report given  Patient transferred to: PACU    Handoff Report: Identifed the Patient, Identified the Reponsible Provider, Reviewed the pertinent medical history, Discussed the surgical course, Reviewed Intra-OP anesthesia mangement and issues during anesthesia, Set expectations for post-procedure period and Allowed opportunity for questions and acknowledgement of understanding      Vitals:  Vitals Value Taken Time   /90 07/15/25 14:49   Temp     Pulse 76 07/15/25 14:51   Resp 17 07/15/25 14:51   SpO2 99 % 07/15/25 14:51   Vitals shown include unfiled device data.    Electronically Signed By: ANAI Mota CRNA  July 15, 2025  2:53 PM

## 2025-07-15 NOTE — PROGRESS NOTES
INPATIENT ROUNDING NOTE  7/15/2025    Patient: Samy Lu    Physician of Record: Taiwo Briceno MD    Admitting diagnosis: Diverticulitis of colon with perforation [K57.20]    Reason for Admission: Diverticulitis    Length of stay: 2    Current Diet: Clear liquids    CURRENT MEDICATIONS:  Continuous Medications:  Current Facility-Administered Medications   Medication Dose Route Frequency Provider Last Rate Last Admin    acetaminophen (TYLENOL) tablet 650 mg  650 mg Oral Q4H PRN Taiwo Briceno MD   650 mg at 07/14/25 0857    Or    acetaminophen (TYLENOL) Suppository 650 mg  650 mg Rectal Q4H PRN Taiwo Briceno MD        ciprofloxacin (CIPRO) infusion 400 mg  400 mg Intravenous Q12H Taiwo Briceno MD   400 mg at 07/15/25 0613    famotidine (PEPCID) injection 20 mg  20 mg Intravenous Q12H Taiwo Briceno MD   20 mg at 07/15/25 1035    HYDROmorphone (DILAUDID) injection 0.2 mg  0.2 mg Intravenous Q2H PRN Taiwo Briceno MD   0.2 mg at 07/13/25 1252    HYDROmorphone (PF) (DILAUDID) injection 0.5-1 mg  0.5-1 mg Intravenous Q2H PRN Taiwo Briceno MD   1 mg at 07/15/25 1031    iohexol (OMNIPAQUE) 9 MG/ML oral solution 500 mL  500 mL Oral Once Rubi Reddy MD        lidocaine (LMX4) cream   Topical Q1H PRN Taiwo Briceno MD        lidocaine 1 % 0.1-1 mL  0.1-1 mL Other Q1H PRN Taiwo Briceno MD        metroNIDAZOLE (FLAGYL) infusion 500 mg  500 mg Intravenous Q12H Taiwo Briceno MD   500 mg at 07/15/25 0453    naloxone (NARCAN) injection 0.2 mg  0.2 mg Intravenous Q2 Min PRTaiwo Suarez MD        Or    naloxone (NARCAN) injection 0.4 mg  0.4 mg Intravenous Q2 Min PRTaiwo Suarez MD        Or    naloxone (NARCAN) injection 0.2 mg  0.2 mg Intramuscular Q2 Min PRTaiwo Suarez MD        Or    naloxone (NARCAN) injection 0.4 mg  0.4 mg Intramuscular Q2 Min PRN Taiwo Briceno MD        ondansetron (ZOFRAN  ODT) ODT tab 4 mg  4 mg Oral Q6H PRN Taiwo Briceno MD        Or    ondansetron (ZOFRAN) injection 4 mg  4 mg Intravenous Q6H PRN Taiwo Briceno MD        piperacillin-tazobactam (ZOSYN) 3.375 g vial to attach to  mL bag  3.375 g Intravenous Q6H Taiwo Briceno MD   3.375 g at 07/15/25 0837    prochlorperazine (COMPAZINE) injection 5 mg  5 mg Intravenous Q6H PRN Taiwo Briceno MD        Or    prochlorperazine (COMPAZINE) tablet 5 mg  5 mg Oral Q6H PRTaiwo Suarez MD        sodium chloride (PF) 0.9% PF flush 3 mL  3 mL Intracatheter Q8H CASIMIRO Taiwo Briceno MD   3 mL at 07/15/25 0623    sodium chloride (PF) 0.9% PF flush 3 mL  3 mL Intracatheter q1 min prn Taiwo Briceno MD        sodium chloride 0.9 % infusion   Intravenous Continuous Taiwo Briceno  mL/hr at 07/14/25 1655 New Bag at 07/14/25 1655    sodium phosphate 15 mmol in NS 250mL intermittent infusion  15 mmol Intravenous Once Taiwo Briceno MD   15 mmol at 07/15/25 1001    timolol maleate (TIMOPTIC) 0.5 % ophthalmic solution 1 drop  1 drop Right Eye Daily Taiwo Briceno MD   1 drop at 07/15/25 0803       Scheduled Medications:  Current Facility-Administered Medications   Medication Dose Route Frequency Provider Last Rate Last Admin    acetaminophen (TYLENOL) tablet 650 mg  650 mg Oral Q4H PRN Taiwo Briceno MD   650 mg at 07/14/25 0857    Or    acetaminophen (TYLENOL) Suppository 650 mg  650 mg Rectal Q4H PRN Taiwo Briceno MD        ciprofloxacin (CIPRO) infusion 400 mg  400 mg Intravenous Q12H Taiwo Briceno MD   400 mg at 07/15/25 0613    famotidine (PEPCID) injection 20 mg  20 mg Intravenous Q12H Taiwo Briceno MD   20 mg at 07/15/25 1035    HYDROmorphone (DILAUDID) injection 0.2 mg  0.2 mg Intravenous Q2H PRN Taiwo Briceno MD   0.2 mg at 07/13/25 1252    HYDROmorphone (PF) (DILAUDID) injection 0.5-1 mg  0.5-1 mg  Intravenous Q2H PRTaiwo Suarez MD   1 mg at 07/15/25 1031    iohexol (OMNIPAQUE) 9 MG/ML oral solution 500 mL  500 mL Oral Once Rubi Reddy MD        lidocaine (LMX4) cream   Topical Q1H PRN Taiwo Briceno MD        lidocaine 1 % 0.1-1 mL  0.1-1 mL Other Q1H PRTaiwo Suarez MD        metroNIDAZOLE (FLAGYL) infusion 500 mg  500 mg Intravenous Q12H Taiwo Briceno MD   500 mg at 07/15/25 0453    naloxone (NARCAN) injection 0.2 mg  0.2 mg Intravenous Q2 Min PRN Taiwo Briceno MD        Or    naloxone (NARCAN) injection 0.4 mg  0.4 mg Intravenous Q2 Min PRTaiwo Suarez MD        Or    naloxone (NARCAN) injection 0.2 mg  0.2 mg Intramuscular Q2 Min PRTaiwo Suarez MD        Or    naloxone (NARCAN) injection 0.4 mg  0.4 mg Intramuscular Q2 Min PRTaiwo Suarez MD        ondansetron (ZOFRAN ODT) ODT tab 4 mg  4 mg Oral Q6H PRN Taiwo Briceno MD        Or    ondansetron (ZOFRAN) injection 4 mg  4 mg Intravenous Q6H PRTaiwo Suarez MD        piperacillin-tazobactam (ZOSYN) 3.375 g vial to attach to  mL bag  3.375 g Intravenous Q6H Taiwo Briceno MD   3.375 g at 07/15/25 0837    prochlorperazine (COMPAZINE) injection 5 mg  5 mg Intravenous Q6H PRTaiwo Suarez MD        Or    prochlorperazine (COMPAZINE) tablet 5 mg  5 mg Oral Q6H PRTaiwo Suarez MD        sodium chloride (PF) 0.9% PF flush 3 mL  3 mL Intracatheter Q8H CASIMIRO Taiwo Briceno MD   3 mL at 07/15/25 0623    sodium chloride (PF) 0.9% PF flush 3 mL  3 mL Intracatheter q1 min prTaiwo Suarez MD        sodium chloride 0.9 % infusion   Intravenous Continuous Taiwo Briceno  mL/hr at 07/14/25 1655 New Bag at 07/14/25 1655    sodium phosphate 15 mmol in NS 250mL intermittent infusion  15 mmol Intravenous Once Taiwo Briceno MD   15 mmol at 07/15/25 1001    timolol maleate (TIMOPTIC) 0.5 %  ophthalmic solution 1 drop  1 drop Right Eye Daily Taiwo Briceno MD   1 drop at 07/15/25 0803       PRN Medications:  Current Facility-Administered Medications   Medication Dose Route Frequency Provider Last Rate Last Admin    acetaminophen (TYLENOL) tablet 650 mg  650 mg Oral Q4H PRN Taiwo Briceno MD   650 mg at 07/14/25 0857    Or    acetaminophen (TYLENOL) Suppository 650 mg  650 mg Rectal Q4H PRN Taiwo Briceno MD        ciprofloxacin (CIPRO) infusion 400 mg  400 mg Intravenous Q12H Taiwo Briceno MD   400 mg at 07/15/25 0613    famotidine (PEPCID) injection 20 mg  20 mg Intravenous Q12H Taiwo Briceno MD   20 mg at 07/15/25 1035    HYDROmorphone (DILAUDID) injection 0.2 mg  0.2 mg Intravenous Q2H PRN Taiwo Briceno MD   0.2 mg at 07/13/25 1252    HYDROmorphone (PF) (DILAUDID) injection 0.5-1 mg  0.5-1 mg Intravenous Q2H PRN Taiwo Briceno MD   1 mg at 07/15/25 1031    iohexol (OMNIPAQUE) 9 MG/ML oral solution 500 mL  500 mL Oral Once Rubi Reddy MD        lidocaine (LMX4) cream   Topical Q1H PRN Taiwo Briceno MD        lidocaine 1 % 0.1-1 mL  0.1-1 mL Other Q1H PRN Taiwo Briceno MD        metroNIDAZOLE (FLAGYL) infusion 500 mg  500 mg Intravenous Q12H Taiwo Briceno MD   500 mg at 07/15/25 0453    naloxone (NARCAN) injection 0.2 mg  0.2 mg Intravenous Q2 Min PRN Taiwo Briceno MD        Or    naloxone (NARCAN) injection 0.4 mg  0.4 mg Intravenous Q2 Min PRTaiwo Suarez MD        Or    naloxone (NARCAN) injection 0.2 mg  0.2 mg Intramuscular Q2 Min PRN Taiwo Briceno MD        Or    naloxone (NARCAN) injection 0.4 mg  0.4 mg Intramuscular Q2 Min PRN Taiwo Briceno MD        ondansetron (ZOFRAN ODT) ODT tab 4 mg  4 mg Oral Q6H PRN Taiwo Briceno MD        Or    ondansetron (ZOFRAN) injection 4 mg  4 mg Intravenous Q6H PRN Taiwo Briceno MD        piperacillin-tazobactam  "(ZOSYN) 3.375 g vial to attach to  mL bag  3.375 g Intravenous Q6H Taiwo Briceno MD   3.375 g at 07/15/25 0837    prochlorperazine (COMPAZINE) injection 5 mg  5 mg Intravenous Q6H PRN Taiwo Briceno MD        Or    prochlorperazine (COMPAZINE) tablet 5 mg  5 mg Oral Q6H PRN Taiwo Briceno MD        sodium chloride (PF) 0.9% PF flush 3 mL  3 mL Intracatheter Q8H CASIMIRO Taiwo Briceno MD   3 mL at 07/15/25 0623    sodium chloride (PF) 0.9% PF flush 3 mL  3 mL Intracatheter q1 min prn Taiwo Briceno MD        sodium chloride 0.9 % infusion   Intravenous Continuous Taiwo Briceno  mL/hr at 07/14/25 1655 New Bag at 07/14/25 1655    sodium phosphate 15 mmol in NS 250mL intermittent infusion  15 mmol Intravenous Once Taiwo Briceno MD   15 mmol at 07/15/25 1001    timolol maleate (TIMOPTIC) 0.5 % ophthalmic solution 1 drop  1 drop Right Eye Daily Taiwo Briceno MD   1 drop at 07/15/25 0803       SUBJECTIVE:   Nausea: No. Vomiting: No. Fever: No. Chills: No. Excessive burping: No. Flatus: No. BM: No. Pain is 5/10. Pain control: fair. Tolerating current diet: Yes.  Patient has required more narcotics.    PHYSICAL EXAM:   Vital signs: /76 (BP Location: Left arm, Patient Position: Supine, Cuff Size: Adult Regular)   Pulse 78   Temp 98.4  F (36.9  C) (Tympanic)   Resp 18   Ht 1.753 m (5' 9\")   Wt 79.4 kg (175 lb 0.7 oz)   SpO2 95%   BMI 25.85 kg/m     Weight: [unfilled]   BMI: Body mass index is 25.85 kg/m .   General: Normal, healthy, cooperative, in no acute distress, alert   HEENT: PERRLA and EOMI   Neck: supple   Lungs: clear to auscultation   CV: RRR   Abdominal: Soft.  Tender throughout.  No junior peritoneal signs.   Extremities: No cyanosis, clubbing or edema noted bilaterally in Upper and Lower Extremities   Neurological: without deficit    INPUT/OUTPUT:      Intake/Output Summary (Last 24 hours) at 7/15/2025 1100  Last data filed at " 7/15/2025 1025  Gross per 24 hour   Intake 2261 ml   Output 1225 ml   Net 1036 ml       I/O last 3 completed shifts:  In: 2261 [I.V.:2261]  Out: 1050 [Urine:1050]    LABS:    Last CBC Rrsults:   Recent Labs   Lab Test 07/15/25  0528 07/14/25  0508 07/13/25  0505   WBC 19.9* 21.9* 20.1*   RBC 4.88 4.95 5.50   HGB 16.0 16.3 18.1*   HCT 43.9 44.7 49.8   MCV 90 90 91   MCH 32.8 32.9 32.9   MCHC 36.4 36.5 36.3   RDW 13.1 13.0 12.9    166 208       Last Comprehensive Metabolic panel:  Recent Labs   Lab Test 07/15/25  0528 07/14/25  0508 07/13/25  0505   * 133* 137   POTASSIUM 3.6 3.9 4.0   CHLORIDE 99 101 102   CO2 18* 19* 21*   ANIONGAP 15 13 14   * 112* 135*   BUN 9.7 10.1 10.5   CR 0.93 1.07 1.01   GFRESTIMATED 88 75 80   NURIS 8.0* 7.9* 8.9   BILITOTAL 3.9* 2.6* 0.9   ALKPHOS 74 48 51   ALT 43 43 17   AST 35 55* 19       Recent Labs   Lab Test 07/15/25  0528 07/14/25  0508 07/13/25  2301 07/13/25  1450 07/13/25  1450 07/13/25  0505   MAG 1.5* 1.9 2.3   < > 1.5*  --    ALBUMIN 3.1* 3.2*  --   --   --  4.0   PHOS 1.9* 2.7  --   --  2.7  --     < > = values in this interval not displayed.       RADIOLOGY:  MRCP last night showed dilated common bile duct to 14 mm.  No definite stones within the biliary system.    ASSESSMENT:    70 year old male admitted for Diverticulitis of colon with perforation [K57.20].  Here for diverticulitis. Hospital day 2.     Because of the patient's elevating bilirubin I went ahead and got a CT of the abdomen pelvis today.  CT shows large amount of free air within the abdomen consistent with perforated viscus.  I did discuss the case with Dr. Amaro and down at [facility] over concern of his elevating bilirubin and his bile duct of 14 mm and his thoughts were most likely this secondary to the abdominal process and not necessarily secondary to a biliary process.    PLAN: Discussed the situation with the patient and the family and we will going take the patient to the operating room  for an exploratory laparotomy.  Patient does understand that he will wake up with a colostomy.

## 2025-07-15 NOTE — OP NOTE
REPORT OF OPERATION  DATE OF PROCEDURE: 7/15/2025    PATIENT: Samy Lu    SURGERY PERFORMED: Exploratory laparotomy, sigmoid colectomy, takedown of splenic flexure, Stoner's procedure with end colostomy, washout of abdomen, and partial omentectomy.    PREOPERATIVE DIAGNOSIS: Perforated viscus    POSTOPERATIVE DIAGNOSIS: Same along with perforation in the rectosigmoid junction    SURGEON: Taiwo Briceno MD    ASSISTANTS: None    ANESTHESIA: General Endotracheal Anesthesia    COMPLICATIONS: None apparent    ESTIMATED BLOOD LOSS: 100 mL     TRANSFUSIONS: None    TISSUE TO PATHOLOGY: Sigmoid colon, distal sigmoid colon, omentum to pathology for pathologic diagnosis    FINDINGS: Perforation located at the sigmoid rectal junction.  Small amount of contamination with purulent looking fluid along the right and left colic gutters.  Very high splenic flexure.    INDICATIONS: This is a 70 year old male who presented to the emergency room with acute onset of abdominal pain.  Has a history of recurrent diverticulitis.  Patient was started on antibiotics and was noted to have elevated bilirubin after 24 hours.  Ultrasound showed no evidence of common bile duct stones.  MRI showed no common bile duct stones.  Repeat CAT scan showed a large amount of free air consistent with perforated viscus.  Patient will take the operating room for Sporter laparotomy.    DESCRIPTIONS OF PROCEDURE IN DETAIL: After consent was obtained the patient was taken to the operative suite and ladarius in the supine position.  The patient was identified and the correct patient was confirmed.  General Endotracheal Anesthesia was administered by anesthesia.  The patient was sterilely prepped and draped in the usual fashion.  A time out was performed verifying the correct patient and the correct procedure.  The entire operative team was in agreement.  All necessary equipment and supplies were in the room.    Through a midline incision the skin was  sharply entered dissected out dissection of the fascia the fascia was opened and entered the abdomen was accomplished.  At this point there was small bowel and omentum walling off a small abscess in the left lower quadrant along the rectosigmoid junction.  All the attachments were lysed and a perforation was identified at the rectosigmoid junction.  A window was made around the colon proximally to the perforation and the colon was divided with a TA 90 stapler.  The mesentery was then taken down to the rectum the rectum was divided with a NURYS 75 stapler.  An additional small area of distal sigmoid rectum was removed to allow future identification and closure more easily.  The edges of the staple line were then marked with 0 Prolene suture.  The evidence then copiously irrigated and the irrigant was removed.  There was purulent material noted along the left and right colic gutters.  The abdomen was irrigated until clear.  This point the splenic flexure needed to be taken down in the left colon was mobilized along the white line of Toldt up to the splenic flexure.  This point the splenic flexure was very high extending almost to the diaphragm.  After tedious dissection this was completely mobilized and taken down.  Was taken not to injure the spleen.  An ostomy site was then decided upon a quarter sized fragment of skin was removed along with the underlying fat.  Incision was made through the fascia and the colon was brought out to the skin level.  The abdomen was jeff irrigated and the air was removed.  219 [place] round drains were placed 1 along the left colic gutter and 1 along the right colic gutter.  These were brought out through separate stab incisions.  Drains were secured to the skin with 0 nylon suture.  The abdomen was again irrigated and the irrigant was removed.  The bowel was run from the ligament of Treitz all of ileocecal valve and no other abnormalities are noted.  Was placed back in the abdomen and  the midline incision was closed with running looped #1 PDS the skin was closed using staples.  The ostomy was then matured to the skin by opening the bowel and maturing the edges to the skin level.  Sterile dressings were applied.  The patient was awakened in the operating room without difficulty.    All needle,  instrument and sponge counts were correct x2.  The patient was awakened in the operating room and taken to the recovery room in stable condition tolerated procedure well.

## 2025-07-15 NOTE — PLAN OF CARE
Patient transferred down to Surgery via cart, with family at bedside. Phosphorus replacement infusing. CHG bath and Bijan completed   Report given to Abraham RN

## 2025-07-16 LAB
ALBUMIN SERPL BCG-MCNC: 2.9 G/DL (ref 3.5–5.2)
ALP SERPL-CCNC: 71 U/L (ref 40–150)
ALT SERPL W P-5'-P-CCNC: 34 U/L (ref 0–70)
ANION GAP SERPL CALCULATED.3IONS-SCNC: 14 MMOL/L (ref 7–15)
AST SERPL W P-5'-P-CCNC: 30 U/L (ref 0–45)
BASOPHILS # BLD AUTO: 0 10E3/UL (ref 0–0.2)
BASOPHILS NFR BLD AUTO: 0 %
BILIRUB SERPL-MCNC: 1.2 MG/DL
BUN SERPL-MCNC: 11.2 MG/DL (ref 8–23)
CALCIUM SERPL-MCNC: 8.1 MG/DL (ref 8.8–10.4)
CHLORIDE SERPL-SCNC: 100 MMOL/L (ref 98–107)
CREAT SERPL-MCNC: 0.85 MG/DL (ref 0.67–1.17)
EGFRCR SERPLBLD CKD-EPI 2021: >90 ML/MIN/1.73M2
EOSINOPHIL # BLD AUTO: 0 10E3/UL (ref 0–0.7)
EOSINOPHIL NFR BLD AUTO: 0 %
ERYTHROCYTE [DISTWIDTH] IN BLOOD BY AUTOMATED COUNT: 13.1 % (ref 10–15)
GLUCOSE SERPL-MCNC: 141 MG/DL (ref 70–99)
HCO3 SERPL-SCNC: 19 MMOL/L (ref 22–29)
HCT VFR BLD AUTO: 46.9 % (ref 40–53)
HGB BLD-MCNC: 16.9 G/DL (ref 13.3–17.7)
IMM GRANULOCYTES # BLD: 0.2 10E3/UL
IMM GRANULOCYTES NFR BLD: 1 %
LYMPHOCYTES # BLD AUTO: 0.6 10E3/UL (ref 0.8–5.3)
LYMPHOCYTES NFR BLD AUTO: 3 %
MAGNESIUM SERPL-MCNC: 1.8 MG/DL (ref 1.7–2.3)
MCH RBC QN AUTO: 32.9 PG (ref 26.5–33)
MCHC RBC AUTO-ENTMCNC: 36 G/DL (ref 31.5–36.5)
MCV RBC AUTO: 91 FL (ref 78–100)
MONOCYTES # BLD AUTO: 1.1 10E3/UL (ref 0–1.3)
MONOCYTES NFR BLD AUTO: 5 %
NEUTROPHILS # BLD AUTO: 18.9 10E3/UL (ref 1.6–8.3)
NEUTROPHILS NFR BLD AUTO: 91 %
NRBC # BLD AUTO: 0 10E3/UL
NRBC BLD AUTO-RTO: 0 /100
PHOSPHATE SERPL-MCNC: 2.8 MG/DL (ref 2.5–4.5)
PLATELET # BLD AUTO: 218 10E3/UL (ref 150–450)
POTASSIUM SERPL-SCNC: 4.4 MMOL/L (ref 3.4–5.3)
PROT SERPL-MCNC: 5.8 G/DL (ref 6.4–8.3)
RBC # BLD AUTO: 5.13 10E6/UL (ref 4.4–5.9)
SODIUM SERPL-SCNC: 133 MMOL/L (ref 135–145)
WBC # BLD AUTO: 20.8 10E3/UL (ref 4–11)

## 2025-07-16 PROCEDURE — 999N000157 HC STATISTIC RCP TIME EA 10 MIN

## 2025-07-16 PROCEDURE — 258N000003 HC RX IP 258 OP 636: Performed by: SURGERY

## 2025-07-16 PROCEDURE — 85004 AUTOMATED DIFF WBC COUNT: CPT | Performed by: SURGERY

## 2025-07-16 PROCEDURE — 84100 ASSAY OF PHOSPHORUS: CPT | Performed by: SURGERY

## 2025-07-16 PROCEDURE — 200N000001 HC R&B ICU

## 2025-07-16 PROCEDURE — 250N000013 HC RX MED GY IP 250 OP 250 PS 637: Performed by: SURGERY

## 2025-07-16 PROCEDURE — 250N000011 HC RX IP 250 OP 636: Performed by: SURGERY

## 2025-07-16 PROCEDURE — 36415 COLL VENOUS BLD VENIPUNCTURE: CPT | Performed by: SURGERY

## 2025-07-16 PROCEDURE — 82310 ASSAY OF CALCIUM: CPT | Performed by: SURGERY

## 2025-07-16 PROCEDURE — 83735 ASSAY OF MAGNESIUM: CPT | Performed by: SURGERY

## 2025-07-16 PROCEDURE — 99232 SBSQ HOSP IP/OBS MODERATE 35: CPT | Mod: 24 | Performed by: INTERNAL MEDICINE

## 2025-07-16 RX ORDER — MAGNESIUM OXIDE 400 MG/1
400 TABLET ORAL EVERY 4 HOURS
Status: DISCONTINUED | OUTPATIENT
Start: 2025-07-16 | End: 2025-07-16

## 2025-07-16 RX ORDER — CIPROFLOXACIN 2 MG/ML
400 INJECTION, SOLUTION INTRAVENOUS EVERY 12 HOURS
Status: DISCONTINUED | OUTPATIENT
Start: 2025-07-16 | End: 2025-07-20

## 2025-07-16 RX ORDER — MAGNESIUM OXIDE 400 MG/1
400 TABLET ORAL ONCE
Status: COMPLETED | OUTPATIENT
Start: 2025-07-16 | End: 2025-07-16

## 2025-07-16 RX ORDER — METRONIDAZOLE 500 MG/100ML
500 INJECTION, SOLUTION INTRAVENOUS EVERY 8 HOURS
Status: DISCONTINUED | OUTPATIENT
Start: 2025-07-16 | End: 2025-07-16 | Stop reason: DRUGHIGH

## 2025-07-16 RX ORDER — MAGNESIUM OXIDE 400 MG/1
400 TABLET ORAL EVERY 4 HOURS
Status: COMPLETED | OUTPATIENT
Start: 2025-07-16 | End: 2025-07-16

## 2025-07-16 RX ORDER — PROCHLORPERAZINE MALEATE 5 MG/1
5 TABLET ORAL EVERY 6 HOURS PRN
Status: DISCONTINUED | OUTPATIENT
Start: 2025-07-16 | End: 2025-07-21 | Stop reason: HOSPADM

## 2025-07-16 RX ORDER — ACETAMINOPHEN 325 MG/1
975 TABLET ORAL EVERY 8 HOURS
Status: DISCONTINUED | OUTPATIENT
Start: 2025-07-16 | End: 2025-07-21 | Stop reason: HOSPADM

## 2025-07-16 RX ORDER — FAMOTIDINE 20 MG/1
20 TABLET, FILM COATED ORAL 2 TIMES DAILY
Status: DISCONTINUED | OUTPATIENT
Start: 2025-07-16 | End: 2025-07-21 | Stop reason: HOSPADM

## 2025-07-16 RX ORDER — METRONIDAZOLE 500 MG/100ML
500 INJECTION, SOLUTION INTRAVENOUS EVERY 12 HOURS
Status: DISCONTINUED | OUTPATIENT
Start: 2025-07-16 | End: 2025-07-20

## 2025-07-16 RX ORDER — PIPERACILLIN SODIUM, TAZOBACTAM SODIUM 4; .5 G/20ML; G/20ML
4.5 INJECTION, POWDER, LYOPHILIZED, FOR SOLUTION INTRAVENOUS EVERY 6 HOURS
Status: DISCONTINUED | OUTPATIENT
Start: 2025-07-16 | End: 2025-07-20

## 2025-07-16 RX ADMIN — FAMOTIDINE 20 MG: 10 INJECTION, SOLUTION INTRAVENOUS at 20:38

## 2025-07-16 RX ADMIN — HYDROMORPHONE HYDROCHLORIDE 0.5 MG: 1 INJECTION, SOLUTION INTRAMUSCULAR; INTRAVENOUS; SUBCUTANEOUS at 13:15

## 2025-07-16 RX ADMIN — HYDROMORPHONE HYDROCHLORIDE 0.5 MG: 1 INJECTION, SOLUTION INTRAMUSCULAR; INTRAVENOUS; SUBCUTANEOUS at 23:39

## 2025-07-16 RX ADMIN — FAMOTIDINE 20 MG: 10 INJECTION, SOLUTION INTRAVENOUS at 08:53

## 2025-07-16 RX ADMIN — METRONIDAZOLE 500 MG: 500 INJECTION, SOLUTION INTRAVENOUS at 14:21

## 2025-07-16 RX ADMIN — ACETAMINOPHEN 975 MG: 325 TABLET ORAL at 00:59

## 2025-07-16 RX ADMIN — HYDROMORPHONE HYDROCHLORIDE 0.5 MG: 1 INJECTION, SOLUTION INTRAMUSCULAR; INTRAVENOUS; SUBCUTANEOUS at 07:28

## 2025-07-16 RX ADMIN — HYDROMORPHONE HYDROCHLORIDE 0.5 MG: 1 INJECTION, SOLUTION INTRAMUSCULAR; INTRAVENOUS; SUBCUTANEOUS at 01:35

## 2025-07-16 RX ADMIN — ACETAMINOPHEN 975 MG: 325 TABLET ORAL at 23:46

## 2025-07-16 RX ADMIN — CIPROFLOXACIN 400 MG: 2 INJECTION, SOLUTION INTRAVENOUS at 14:10

## 2025-07-16 RX ADMIN — PIPERACILLIN AND TAZOBACTAM 4.5 G: 4; .5 INJECTION, POWDER, FOR SOLUTION INTRAVENOUS at 20:34

## 2025-07-16 RX ADMIN — SODIUM CHLORIDE, POTASSIUM CHLORIDE, SODIUM LACTATE AND CALCIUM CHLORIDE: 600; 310; 30; 20 INJECTION, SOLUTION INTRAVENOUS at 14:20

## 2025-07-16 RX ADMIN — MAGNESIUM OXIDE TAB 400 MG (241.3 MG ELEMENTAL MG) 400 MG: 400 (241.3 MG) TAB at 20:59

## 2025-07-16 RX ADMIN — SODIUM CHLORIDE, POTASSIUM CHLORIDE, SODIUM LACTATE AND CALCIUM CHLORIDE: 600; 310; 30; 20 INJECTION, SOLUTION INTRAVENOUS at 05:09

## 2025-07-16 RX ADMIN — HYDROMORPHONE HYDROCHLORIDE 0.5 MG: 1 INJECTION, SOLUTION INTRAMUSCULAR; INTRAVENOUS; SUBCUTANEOUS at 10:39

## 2025-07-16 RX ADMIN — ACETAMINOPHEN 975 MG: 325 TABLET ORAL at 16:39

## 2025-07-16 RX ADMIN — MAGNESIUM OXIDE TAB 400 MG (241.3 MG ELEMENTAL MG) 400 MG: 400 (241.3 MG) TAB at 16:39

## 2025-07-16 RX ADMIN — ACETAMINOPHEN 975 MG: 325 TABLET ORAL at 08:57

## 2025-07-16 RX ADMIN — PIPERACILLIN AND TAZOBACTAM 4.5 G: 4; .5 INJECTION, POWDER, FOR SOLUTION INTRAVENOUS at 15:27

## 2025-07-16 ASSESSMENT — ACTIVITIES OF DAILY LIVING (ADL)
ADLS_ACUITY_SCORE: 39
ADLS_ACUITY_SCORE: 40
ADLS_ACUITY_SCORE: 40
ADLS_ACUITY_SCORE: 39
ADLS_ACUITY_SCORE: 40
ADLS_ACUITY_SCORE: 39
ADLS_ACUITY_SCORE: 40
ADLS_ACUITY_SCORE: 39
ADLS_ACUITY_SCORE: 40
ADLS_ACUITY_SCORE: 39
ADLS_ACUITY_SCORE: 40
ADLS_ACUITY_SCORE: 39
ADLS_ACUITY_SCORE: 40
ADLS_ACUITY_SCORE: 39
ADLS_ACUITY_SCORE: 39
ADLS_ACUITY_SCORE: 40

## 2025-07-16 NOTE — PROGRESS NOTES
INPATIENT ROUNDING NOTE  7/16/2025    Patient: Samy Lu    Physician of Record: Taiwo Briceno MD    Admitting diagnosis: Diverticulitis of colon with perforation [K57.20]  Perforated bowel (H) [K63.1]    Procedure(s):  LAPAROTOMY, Takedown Splenic Flexure, Hartmanns Procedure with Creation End Ostomy  COLECTOMY, SIGMOID, OPEN     POD: 1 Day Post-Op    Current Diet: NPO    CURRENT MEDICATIONS:  Continuous Medications:  Current Facility-Administered Medications   Medication Dose Route Frequency Provider Last Rate Last Admin    acetaminophen (TYLENOL) tablet 975 mg  975 mg Oral Q8H Taiwo Briceno MD   975 mg at 07/16/25 0857    dexAMETHasone (DECADRON) injection 4 mg  4 mg Intravenous Once PRN Pettinari, Nhung, APRN CRNA        famotidine (PEPCID) tablet 20 mg  20 mg Oral BID Taiwo Briceno MD        Or    famotidine (PEPCID) injection 20 mg  20 mg Intravenous BID Taiwo Briceno MD   20 mg at 07/16/25 0853    HYDROmorphone (PF) (DILAUDID) injection 0.5 mg  0.5 mg Intravenous Q2H PRN Taiwo Briceno MD   0.5 mg at 07/16/25 0728    Or    HYDROmorphone (DILAUDID) injection 0.8 mg  0.8 mg Intravenous Q2H PRN Taiwo Briceno MD   0.8 mg at 07/15/25 2110    lactated ringers infusion   Intravenous Continuous Taiwo Briceno  mL/hr at 07/16/25 0509 New Bag at 07/16/25 0509    lidocaine (LMX4) cream   Topical Q1H PRN Taiwo Briceno MD        lidocaine 1 % 0.1-1 mL  0.1-1 mL Other Q1H PRN Taiwo Briceno MD        naloxone (NARCAN) injection 0.1 mg  0.1 mg Intravenous Q2 Min PRN Pettinari, Nhung, APRN CRNA        ondansetron (ZOFRAN ODT) ODT tab 4 mg  4 mg Oral Q30 Min PRN Pettinari, Nhung, APRN CRNA        Or    ondansetron (ZOFRAN) injection 4 mg  4 mg Intravenous Q30 Min PRN Pettinari, Nhung, APRN CRNA        ondansetron (ZOFRAN ODT) ODT tab 4 mg  4 mg Oral Q6H PRN Taiwo Briceno MD        Or    ondansetron (ZOFRAN) injection 4 mg  4 mg  Intravenous Q6H PRN Taiwo Briceno MD        prochlorperazine (COMPAZINE) injection 5 mg  5 mg Intravenous Q6H PRN Taiwo Briceno MD        Or    prochlorperazine (COMPAZINE) tablet 5 mg  5 mg Oral Q6H PRN Taiwo Briceno MD        sodium chloride (PF) 0.9% PF flush 3 mL  3 mL Intracatheter Q8H CASIMIRO Taiwo Briceno MD   3 mL at 07/16/25 0509    sodium chloride (PF) 0.9% PF flush 3 mL  3 mL Intracatheter q1 min prn Taiwo Briceno MD           Scheduled Medications:  Current Facility-Administered Medications   Medication Dose Route Frequency Provider Last Rate Last Admin    acetaminophen (TYLENOL) tablet 975 mg  975 mg Oral Q8H Taiwo Briceno MD   975 mg at 07/16/25 0857    dexAMETHasone (DECADRON) injection 4 mg  4 mg Intravenous Once PRN PetbhavikriDarielana, APRN CRNA        famotidine (PEPCID) tablet 20 mg  20 mg Oral BID Taiwo Briceno MD        Or    famotidine (PEPCID) injection 20 mg  20 mg Intravenous BID Taiwo Briceno MD   20 mg at 07/16/25 0853    HYDROmorphone (PF) (DILAUDID) injection 0.5 mg  0.5 mg Intravenous Q2H PRN Taiwo Briceno MD   0.5 mg at 07/16/25 0728    Or    HYDROmorphone (DILAUDID) injection 0.8 mg  0.8 mg Intravenous Q2H PRN Taiwo Briceno MD   0.8 mg at 07/15/25 2110    lactated ringers infusion   Intravenous Continuous Taiwo Briceno  mL/hr at 07/16/25 0509 New Bag at 07/16/25 0509    lidocaine (LMX4) cream   Topical Q1H PRN Taiwo Briceno MD        lidocaine 1 % 0.1-1 mL  0.1-1 mL Other Q1H PRN Taiwo Briceno MD        naloxone (NARCAN) injection 0.1 mg  0.1 mg Intravenous Q2 Min PRN Petbhavikri Nhung, APRN CRNA        ondansetron (ZOFRAN ODT) ODT tab 4 mg  4 mg Oral Q30 Min PRN Nhung Rivera, ANAI CRNA        Or    ondansetron (ZOFRAN) injection 4 mg  4 mg Intravenous Q30 Min PRN Nhung Rivera APRN CRNA        ondansetron (ZOFRAN ODT) ODT tab 4 mg  4 mg Oral Q6H PRN Taiwo Briceno  MD Morales        Or    ondansetron (ZOFRAN) injection 4 mg  4 mg Intravenous Q6H PRN Taiwo Briceno MD        prochlorperazine (COMPAZINE) injection 5 mg  5 mg Intravenous Q6H PRN Taiwo Briceno MD        Or    prochlorperazine (COMPAZINE) tablet 5 mg  5 mg Oral Q6H PRN Taiwo Briceno MD        sodium chloride (PF) 0.9% PF flush 3 mL  3 mL Intracatheter Q8H CASIMIRO Taiwo Briceno MD   3 mL at 07/16/25 0509    sodium chloride (PF) 0.9% PF flush 3 mL  3 mL Intracatheter q1 min prn Taiwo Briceno MD           PRN Medications:  Current Facility-Administered Medications   Medication Dose Route Frequency Provider Last Rate Last Admin    acetaminophen (TYLENOL) tablet 975 mg  975 mg Oral Q8H Taiwo Briceno MD   975 mg at 07/16/25 0857    dexAMETHasone (DECADRON) injection 4 mg  4 mg Intravenous Once PRN Nhung Rivera, APRN CRNA        famotidine (PEPCID) tablet 20 mg  20 mg Oral BID Taiwo Briceno MD        Or    famotidine (PEPCID) injection 20 mg  20 mg Intravenous BID Taiwo Briceno MD   20 mg at 07/16/25 0853    HYDROmorphone (PF) (DILAUDID) injection 0.5 mg  0.5 mg Intravenous Q2H PRN Taiwo Briceno MD   0.5 mg at 07/16/25 0728    Or    HYDROmorphone (DILAUDID) injection 0.8 mg  0.8 mg Intravenous Q2H PRN Taiwo Briceno MD   0.8 mg at 07/15/25 2110    lactated ringers infusion   Intravenous Continuous Taiwo Briceno  mL/hr at 07/16/25 0509 New Bag at 07/16/25 0509    lidocaine (LMX4) cream   Topical Q1H PRN Taiwo Briceno MD        lidocaine 1 % 0.1-1 mL  0.1-1 mL Other Q1H PRN Taiwo Briceno MD        naloxone (NARCAN) injection 0.1 mg  0.1 mg Intravenous Q2 Min PRN Nhung Rivera, APRN CRNA        ondansetron (ZOFRAN ODT) ODT tab 4 mg  4 mg Oral Q30 Min PRN Pettinari, Nhung, APRN CRNA        Or    ondansetron (ZOFRAN) injection 4 mg  4 mg Intravenous Q30 Min PRN Pettinari, Nhung, APRN CRNA        ondansetron  "(ZOFRAN ODT) ODT tab 4 mg  4 mg Oral Q6H PRN Taiwo Briceno MD        Or    ondansetron (ZOFRAN) injection 4 mg  4 mg Intravenous Q6H PRTaiwo Suarez MD        prochlorperazine (COMPAZINE) injection 5 mg  5 mg Intravenous Q6H PRTaiwo Suarez MD        Or    prochlorperazine (COMPAZINE) tablet 5 mg  5 mg Oral Q6H PRN Taiwo Briceno MD        sodium chloride (PF) 0.9% PF flush 3 mL  3 mL Intracatheter Q8H CASIMIRO Taiwo Briceno MD   3 mL at 07/16/25 0509    sodium chloride (PF) 0.9% PF flush 3 mL  3 mL Intracatheter q1 min prTaiwo Suarez MD           SUBJECTIVE:   Nausea: No. Vomiting: No--NG intact. Fever: No. Chills: No. Excessive burping: No. Flatus: No. BM: No. Pain control: good. Tolerating current diet: N/A     PHYSICAL EXAM:   Vital signs: BP (!) 156/85 (BP Location: Left arm, Cuff Size: Adult Regular)   Pulse 67   Temp 98.6  F (37  C) (Tympanic)   Resp 14   Ht 1.753 m (5' 9\")   Wt 84.3 kg (185 lb 13.6 oz)   SpO2 96%   BMI 27.44 kg/m     BMI: Body mass index is 27.44 kg/m .   General: Normal, healthy, cooperative, in no acute distress, alert   Lungs: respirations are non-labored   Abdominal: non-distended, ALISIA drains with serosanguineous drainage   Wound: mepilex pad intact   Extremities: No cyanosis, clubbing or edema noted bilaterally in Upper and Lower Extremities   Neurological: without deficit    INPUT/OUTPUT:      Intake/Output Summary (Last 24 hours) at 7/16/2025 0919  Last data filed at 7/16/2025 0730  Gross per 24 hour   Intake 2427 ml   Output 2370 ml   Net 57 ml       I/O last 3 completed shifts:  In: 2427 [I.V.:2327; NG/GT:100]  Out: 2320 [Urine:1575; Emesis/NG output:200; Drains:445; Blood:100]    LABS:    Last CBC Rrsults:   Recent Labs   Lab Test 07/16/25  0456 07/15/25  0528 07/14/25  0508   WBC 20.8* 19.9* 21.9*   RBC 5.13 4.88 4.95   HGB 16.9 16.0 16.3   HCT 46.9 43.9 44.7   MCV 91 90 90   MCH 32.9 32.8 32.9   MCHC 36.0 36.4 36.5 "   RDW 13.1 13.1 13.0    165 166       Last Comprehensive Metabolic panel:  Recent Labs   Lab Test 07/16/25  0456 07/15/25  1133 07/15/25  0528 07/14/25  0508   *  --  132* 133*   POTASSIUM 4.4  --  3.6 3.9   CHLORIDE 100  --  99 101   CO2 19*  --  18* 19*   ANIONGAP 14  --  15 13   * 111* 107* 112*   BUN 11.2  --  9.7 10.1   CR 0.85  --  0.93 1.07   GFRESTIMATED >90  --  88 75   NURIS 8.1*  --  8.0* 7.9*   BILITOTAL 1.2  --  3.9* 2.6*   ALKPHOS 71  --  74 48   ALT 34  --  43 43   AST 30  --  35 55*       Recent Labs   Lab Test 07/16/25  0456 07/15/25  2307 07/15/25  1454 07/15/25  1224 07/15/25  0528 07/14/25  0508   MAG 1.8  --   --  2.1 1.5* 1.9   ALBUMIN 2.9*  --   --   --  3.1* 3.2*   PHOS 2.8 2.9 3.6  --  1.9* 2.7       ASSESSMENT:    1 Day Post-Op from Procedure(s):  LAPAROTOMY, Takedown Splenic Flexure, Hartmanns Procedure with Creation End Ostomy  COLECTOMY, SIGMOID, OPEN.      PLAN:   Up in chair and ambulating with assist

## 2025-07-16 NOTE — PROGRESS NOTES
"[facility]  Hospitalist Progress Note          Assessment and Plan:     Assessment & Plan  Samy Lu is a 70 year old male admitted on 7/13/2025. He is status post exploratory laparotomy, sigmoid colectomy, takedown of splenic flexure, with Stoner's procedure and end colostomy.  Procedure was done under the care of Dr. Taiwo Briceno.  Procedure completed due to perforated fixed skin is secondary to perforated diverticulitis.  During the surgical procedure patient was said to have a short run of SVT lasting 15 to 20 seconds.  Postoperatively the patient denies any chest pain or shortness of breath.     1) CV: As stated the patient was said to have had a short run of SVT.  He does not have any cardiac history.  He denied any chest pain.  He has had some mild electrolyte abnormalities as of late in the context of his diverticulitis.  At this time only recommendation would be to remain on telemetry overnight and continue to monitor fluid status and electrolytes.  -7/16:  Vitals stable.  No recurrent SVT.   Recommend continuing Tele for another 24 hrs.  If recurrent SVT please feel free to contact us.  Medicine signing off.            Clinically Significant Risk Factors         # Hyponatremia: Lowest Na = 132 mmol/L in last 2 days, will monitor as appropriate     # Hypomagnesemia: Lowest Mg = 1.5 mg/dL in last 2 days, will replace as needed   # Hypoalbuminemia: Lowest albumin = 2.9 g/dL at 7/16/2025  4:56 AM, will monitor as appropriate                # Overweight: Estimated body mass index is 27.44 kg/m  as calculated from the following:    Height as of this encounter: 1.753 m (5' 9\").    Weight as of this encounter: 84.3 kg (185 lb 13.6 oz)., PRESENT ON ADMISSION                  Interval History:     Patient with some pain this morning.  No arrhythmias overnight. Vitals remain stable               Medications:     Current Facility-Administered Medications   Medication Dose Route Frequency " Provider Last Rate Last Admin    acetaminophen (TYLENOL) tablet 975 mg  975 mg Oral Q8H Taiwo Briceno MD   975 mg at 25 0059    famotidine (PEPCID) tablet 20 mg  20 mg Oral BID Taiwo Briceno MD        Or    famotidine (PEPCID) injection 20 mg  20 mg Intravenous BID Taiwo Briceno MD   20 mg at 07/15/25 2111    sodium chloride (PF) 0.9% PF flush 3 mL  3 mL Intracatheter Q8H CASIMIRO Taiwo Briceno MD   3 mL at 25 0509                  Physical Exam:     Vitals:    25 0445 25 0500 25 0507 25 0732   BP: (!) 153/86 (!) 154/89 (!) 156/85    BP Location:   Left arm    Cuff Size:   Adult Regular    Pulse: 63 66 68 67   Resp: 18 16 20 14   Temp:   98.2  F (36.8  C) 98.6  F (37  C)   TempSrc:   Tympanic Tympanic   SpO2: 94% 94% 94% 96%   Weight:   84.3 kg (185 lb 13.6 oz)    Height:             Vital Sign Ranges  Temperature Temp  Av.6  F (37  C)  Min: 98.1  F (36.7  C)  Max: 99.8  F (37.7  C)   Blood pressure Systolic (24hrs), Av , Min:109 , Max:168        Diastolic (24hrs), Av, Min:58, Max:114      Pulse Pulse  Av.1  Min: 60  Max: 87   Respirations Resp  Av.1  Min: 7  Max: 26   Pulse oximetry SpO2  Av %  Min: 91 %  Max: 99 %         Intake/Output Summary (Last 24 hours) at 2025 0817  Last data filed at 2025 0730  Gross per 24 hour   Intake 2427 ml   Output 2370 ml   Net 57 ml     Constitutional: Awake, lethargic.   Respiratory: CTA bilaterally.  No wheezes or rhonchi.    Cardiovascular: RRR. No murmurs.    GI: Abdomen in dressing with colostomy in place and a drain in the right side of the abdomen.  Bowel sounds are quiet.  Musculoskeletal: no lower extremity pitting edema present  Neurologic: No focal deficits noted.    Neuropsychiatric: Alert and cooperative.   Normal Affect.      Peripheral IV 25 Anterior;Left Lower forearm (Active)   Site Assessment WDL 25 0730   Line Status Saline locked 25 0730    Dressing Transparent 07/16/25 0730   Dressing Status clean;dry;intact 07/16/25 0730   Line Intervention Flushed 07/16/25 0730   Line Necessity Yes, meets criteria 07/16/25 0730   Phlebitis Scale 0-->no symptoms 07/16/25 0730   Infiltration? no 07/16/25 0730   Number of days: 2       Peripheral IV 07/15/25 Anterior;Right Upper forearm (Active)   Site Assessment [facility] 07/16/25 0730   Line Status Infusing 07/16/25 0730   Dressing Transparent 07/16/25 0730   Dressing Status clean;dry;intact 07/16/25 0730   Line Necessity Yes, meets criteria 07/16/25 0730   Phlebitis Scale 0-->no symptoms 07/16/25 0730   Infiltration? no 07/16/25 0730   Number of days: 1       GI Enteral Access Device Nare, left Gastric 18 fr (Active)   Status Suction-low intermittent 07/16/25 0732   Placement Confirmation [place] unchanged;Tube secure 07/16/25 0732   Surrounding Skin Assessment [facility] 07/16/25 0732   Insertion Site Assessment [facility] 07/16/25 0732   [place] (cm marking) at nare/mouth 76 cm 07/16/25 0732   [place] (visual) [place] at entry site (nare, mouth, etc.) 07/16/25 0732   Securement Device Tape 07/16/25 0732   Drainage Brown 07/16/25 0732   Intake (mL) 40 ml 07/16/25 0100   Flush/Free Water (mL) 60 mL 07/16/25 0100   Output (mL) 200 ml 07/16/25 0507   Number of days: 1       Drain Closed/Suction 1 Left LLQ Bulb 19 [place] (Active)   Site Description [facility] 07/16/25 0732   Dressing Status Dressing Changed;Normal: Clean, Dry & Intact 07/16/25 0732   Drainage Appearance Serosanguenous 07/16/25 0507   Status To bulb suction 07/16/25 0507   Output (mL) 25 ml 07/16/25 0507   Number of days: 1       Drain Closed/Suction 2 Right RLQ Bulb 19 [place] (Active)   Site Description [facility] 07/16/25 0732   Dressing Status Dressing Changed;Normal: Clean, Dry & Intact 07/16/25 0732   Drainage Appearance Serosanguenous 07/16/25 0507   Status To bulb suction 07/16/25 0507   Output (mL) 30 ml 07/16/25 0507   Number of days: 1       Urinary Drain 07/15/25  Urethral Catheter Double-lumen 16 fr (Active)   Tube Description Positional 07/16/25 0732   Catheter Care Catheter wipes 07/16/25 0100   Perineal Skin Assessment Intact 07/16/25 0732   Collection Container Standard 07/16/25 0732   Securement Method Commercial securement device 07/16/25 0732   Rationale for Continued Use Surgical procedure 07/16/25 0732   Urine Output 225 mL 07/16/25 0730   Number of days: 1       Ostomy Colostomy LLQ End (Active)   Stoma Assessment Red 07/16/25 0732   Stomal Appliance 2 piece 07/16/25 0732   Output Consistency Liquid 07/16/25 0507   Number of days: 1       Incision/Surgical Site 07/15/25 Midline Abdomen (Active)   Incision Assessment UTV 07/16/25 0732   Dressing Foam 07/16/25 0732   Lisa-Incision Assessment UTV 07/16/25 0732   Closure MONICA 07/16/25 0732   Incision Drainage Amount Scant 07/16/25 0732   Drainage Description UTV 07/16/25 0732   Dressing Intervention Dressing marked - Extended 07/16/25 0732   Number of days: 1     No line/device             Data:     Lab Results   Component Value Date    WBC 20.8 (H) 07/16/2025    WBC 19.9 (H) 07/15/2025    WBC 21.9 (H) 07/14/2025    HGB 16.9 07/16/2025    HGB 16.0 07/15/2025    HGB 16.3 07/14/2025    HCT 46.9 07/16/2025    HCT 43.9 07/15/2025    HCT 44.7 07/14/2025     07/16/2025     07/15/2025     07/14/2025     (L) 07/16/2025     (L) 07/15/2025     (L) 07/14/2025    POTASSIUM 4.4 07/16/2025    POTASSIUM 3.6 07/15/2025    POTASSIUM 3.9 07/14/2025    CHLORIDE 100 07/16/2025    CHLORIDE 99 07/15/2025    CHLORIDE 101 07/14/2025    CO2 19 (L) 07/16/2025    CO2 18 (L) 07/15/2025    CO2 19 (L) 07/14/2025    BUN 11.2 07/16/2025    BUN 9.7 07/15/2025    BUN 10.1 07/14/2025    CR 0.85 07/16/2025    CR 0.93 07/15/2025    CR 1.07 07/14/2025     (H) 07/16/2025     (H) 07/15/2025     (H) 07/15/2025    SED 5 03/28/2024    AST 30 07/16/2025    AST 35 07/15/2025    AST 55 (H) 07/14/2025     ALT 34 07/16/2025    ALT 43 07/15/2025    ALT 43 07/14/2025    ALKPHOS 74 07/15/2025    ALKPHOS 48 07/14/2025    ALKPHOS 51 07/13/2025    BILITOTAL 1.2 07/16/2025    BILITOTAL 3.9 (H) 07/15/2025    BILITOTAL 2.6 (H) 07/14/2025    INR 1.03 04/14/2023     Lactic Acid   Date Value Ref Range Status   07/13/2025 1.3 0.7 - 2.0 mmol/L Final   09/06/2018 0.9 0.4 - 2.0 mmol/L Final       John Neff, DO

## 2025-07-16 NOTE — PROVIDER NOTIFICATION
Notified surgeon via phone, patient c/o intermit hallucinations, seeing red letters on ceiling, snakes on wall, now had episode where he felt his orourke catheter was bleeding all over, patient wife wiped with tissue, no blood, no leaking at insertion site.  Able to answer orientation questions, patient calm and cooperative,  Dr. Briceno mentioned to re consult hospitalist about hallucination over night. Surgeon mentioned possible withdrawal starting if he is a drinker.  Did ask patient and patient denies drinking. Dr. Neff made aware, mentioned as long as patient is calm and cooperative to keep an eye on it.       Idalia Diane RN on 7/16/2025 at 6:28 PM

## 2025-07-16 NOTE — PROGRESS NOTES
ICU staff discussed patient with myself as they were unable to get a of the primary team.  Apparently he is having some hallucinations though mild.  Seeing letters on the ceiling and feeling as though he may be floating.  He is redirectable and cooperative despite these hallucination.  No agitation is reported.    In addition there was some report from family that he had some blood at his meatus/Starkey insertion site.  It is noted that patient was pulling at his Starkey earlier today when seen as he was trying to reach for his call light.  Likely localized trauma    1) Hallucinations: Recommendation to refrain from excessive pain medications.  Redirect the patient.  If felt absolutely necessary and the patient became more agitated Haldol could be considered however I would hold off on this currently.    2) Meatus bleeding: Likely due to localized trauma.  No further workup required at this time.  Continue to monitor.    John Neff, DO

## 2025-07-16 NOTE — PLAN OF CARE
Plan of Care Reviewed With: Patient    Overall Patient Progress: Progressing    VS and assessments as charted. Intermittent confusion noted, mostly disoriented as to time. Reports pain in the abdomen, rated 2-8/10. PRN Dilaudid given x2. Also receiving scheduled Tylenol. O2 sats WDL on RA. SR 60s-70s. NPO. NG to LIS; 76 cm at the nare. Midline abdominal incision; dressing intact with 2 small shadow areas of drainage noted; no change this shift. Abdomen rounded, but soft. BS hypoactive. No nausea/vomiting. Colostomy left abdomen; small amount flatus in bag this morning. Scant amount bloody liquid in bag. ALISIA drain x2; serosanguinous output. Starkey catheter patent; urine output quantity sufficient. Bilateral SCDs on. Free from falls and/or injury this shift. Call light within reach. Bed locked and low; alarm on.    Face to face report given with opportunity to observe patient.  Report given to JACKIE Friedman.    Matt Fisher RN  7/16/2025, 7:32 AM

## 2025-07-16 NOTE — PROVIDER NOTIFICATION
Patient c/o intermit hallucinating, able to answer orientation questions, patient states he knows he is hallucinating and last roughly a minute, has happened a couple times per patient this shift, seeing red letters on the ceiling and seeing black snacks, attempted to call surgeon to notify.       Idalia Diane RN on 7/16/2025 at 4:15 PM

## 2025-07-16 NOTE — PLAN OF CARE
"Plan of Care Reviewed With: patient    Overall Patient Progress: progressing regarding GI, but now having hallucinations    BP (!) 140/108   Pulse 65   Temp 99.1  F (37.3  C) (Tympanic)   Resp 16   Ht 1.753 m (5' 9\")   Wt 84.3 kg (185 lb 13.6 oz)   SpO2 96%   BMI 27.44 kg/m      A&O, intermit confusion, c/o hallucinations (see prev note), Bowel sounds hypoactive, denies N/V, midline dressing scant drainage extending from prior border, borders marked, NG 76 at nare to low intermit suction, 200cc output, remains NPO, PIV LR at 100ml/hr, received PRN dilaudid x 3 (0.5 dose), as well as scheduled tylenol with relief, occasionally c/o back pain pillow support provided with relief,  telemetry SR, remains on RA, highest temp 99.1, orourke in place 1175 output, ALISIA to R and L 50cc out of A, 35cc out of B, colostomy in place air in bag, scant bloody drainage to bag, free from falls, up in chair, SBA, call light within reach, makes needs known, bed alarm on.     Face to face report given with opportunity to observe patient.    Report given to JACKIE Hwang RN   7/16/2025  7:01 PM         "

## 2025-07-17 VITALS
HEIGHT: 69 IN | BODY MASS INDEX: 27.92 KG/M2 | SYSTOLIC BLOOD PRESSURE: 161 MMHG | HEART RATE: 59 BPM | RESPIRATION RATE: 15 BRPM | TEMPERATURE: 98.3 F | OXYGEN SATURATION: 97 % | WEIGHT: 188.49 LBS | DIASTOLIC BLOOD PRESSURE: 97 MMHG

## 2025-07-17 LAB
ALBUMIN SERPL BCG-MCNC: 2.7 G/DL (ref 3.5–5.2)
ALP SERPL-CCNC: 55 U/L (ref 40–150)
ALT SERPL W P-5'-P-CCNC: 28 U/L (ref 0–70)
ANION GAP SERPL CALCULATED.3IONS-SCNC: 13 MMOL/L (ref 7–15)
AST SERPL W P-5'-P-CCNC: 37 U/L (ref 0–45)
BACTERIA SPEC CULT: NORMAL
BACTERIA SPEC CULT: NORMAL
BASOPHILS # BLD AUTO: 0 10E3/UL (ref 0–0.2)
BASOPHILS NFR BLD AUTO: 0 %
BILIRUB SERPL-MCNC: 0.9 MG/DL
BUN SERPL-MCNC: 14.4 MG/DL (ref 8–23)
CALCIUM SERPL-MCNC: 7.9 MG/DL (ref 8.8–10.4)
CHLORIDE SERPL-SCNC: 96 MMOL/L (ref 98–107)
CREAT SERPL-MCNC: 0.87 MG/DL (ref 0.67–1.17)
EGFRCR SERPLBLD CKD-EPI 2021: >90 ML/MIN/1.73M2
EOSINOPHIL # BLD AUTO: 0 10E3/UL (ref 0–0.7)
EOSINOPHIL NFR BLD AUTO: 0 %
ERYTHROCYTE [DISTWIDTH] IN BLOOD BY AUTOMATED COUNT: 12.7 % (ref 10–15)
GLUCOSE SERPL-MCNC: 114 MG/DL (ref 70–99)
HCO3 SERPL-SCNC: 22 MMOL/L (ref 22–29)
HCT VFR BLD AUTO: 43.1 % (ref 40–53)
HGB BLD-MCNC: 15.9 G/DL (ref 13.3–17.7)
IMM GRANULOCYTES # BLD: 0.1 10E3/UL
IMM GRANULOCYTES NFR BLD: 1 %
LYMPHOCYTES # BLD AUTO: 1.1 10E3/UL (ref 0.8–5.3)
LYMPHOCYTES NFR BLD AUTO: 6 %
MAGNESIUM SERPL-MCNC: 1.6 MG/DL (ref 1.7–2.3)
MCH RBC QN AUTO: 32.9 PG (ref 26.5–33)
MCHC RBC AUTO-ENTMCNC: 36.9 G/DL (ref 31.5–36.5)
MCV RBC AUTO: 89 FL (ref 78–100)
MONOCYTES # BLD AUTO: 1.6 10E3/UL (ref 0–1.3)
MONOCYTES NFR BLD AUTO: 9 %
NEUTROPHILS # BLD AUTO: 14.2 10E3/UL (ref 1.6–8.3)
NEUTROPHILS NFR BLD AUTO: 83 %
NRBC # BLD AUTO: 0 10E3/UL
NRBC BLD AUTO-RTO: 0 /100
PHOSPHATE SERPL-MCNC: 1.8 MG/DL (ref 2.5–4.5)
PHOSPHATE SERPL-MCNC: 2.6 MG/DL (ref 2.5–4.5)
PLAT MORPH BLD: NORMAL
PLATELET # BLD AUTO: 233 10E3/UL (ref 150–450)
POTASSIUM SERPL-SCNC: 3.8 MMOL/L (ref 3.4–5.3)
PROT SERPL-MCNC: 5.5 G/DL (ref 6.4–8.3)
RBC # BLD AUTO: 4.83 10E6/UL (ref 4.4–5.9)
RBC MORPH BLD: NORMAL
SODIUM SERPL-SCNC: 131 MMOL/L (ref 135–145)
WBC # BLD AUTO: 17 10E3/UL (ref 4–11)

## 2025-07-17 PROCEDURE — 258N000003 HC RX IP 258 OP 636: Performed by: SURGERY

## 2025-07-17 PROCEDURE — 83735 ASSAY OF MAGNESIUM: CPT | Performed by: SURGERY

## 2025-07-17 PROCEDURE — 80053 COMPREHEN METABOLIC PANEL: CPT | Performed by: SURGERY

## 2025-07-17 PROCEDURE — 250N000011 HC RX IP 250 OP 636: Performed by: NURSE PRACTITIONER

## 2025-07-17 PROCEDURE — 84100 ASSAY OF PHOSPHORUS: CPT | Performed by: SURGERY

## 2025-07-17 PROCEDURE — 36415 COLL VENOUS BLD VENIPUNCTURE: CPT | Performed by: SURGERY

## 2025-07-17 PROCEDURE — 250N000011 HC RX IP 250 OP 636: Performed by: SURGERY

## 2025-07-17 PROCEDURE — 250N000009 HC RX 250: Performed by: SURGERY

## 2025-07-17 PROCEDURE — 200N000001 HC R&B ICU

## 2025-07-17 PROCEDURE — 250N000013 HC RX MED GY IP 250 OP 250 PS 637: Performed by: SURGERY

## 2025-07-17 PROCEDURE — 85004 AUTOMATED DIFF WBC COUNT: CPT | Performed by: SURGERY

## 2025-07-17 RX ORDER — MAGNESIUM OXIDE 400 MG/1
400 TABLET ORAL EVERY 4 HOURS
Status: COMPLETED | OUTPATIENT
Start: 2025-07-17 | End: 2025-07-17

## 2025-07-17 RX ORDER — POTASSIUM CHLORIDE 20MEQ/15ML
20 LIQUID (ML) ORAL ONCE
Status: COMPLETED | OUTPATIENT
Start: 2025-07-17 | End: 2025-07-17

## 2025-07-17 RX ORDER — ENOXAPARIN SODIUM 100 MG/ML
40 INJECTION SUBCUTANEOUS EVERY 24 HOURS
Status: DISCONTINUED | OUTPATIENT
Start: 2025-07-17 | End: 2025-07-21 | Stop reason: HOSPADM

## 2025-07-17 RX ADMIN — MAGNESIUM OXIDE TAB 400 MG (241.3 MG ELEMENTAL MG) 400 MG: 400 (241.3 MG) TAB at 10:20

## 2025-07-17 RX ADMIN — SODIUM PHOSPHATE, MONOBASIC, MONOHYDRATE AND SODIUM PHOSPHATE, DIBASIC, ANHYDROUS 9 MMOL: 142; 276 INJECTION, SOLUTION INTRAVENOUS at 10:21

## 2025-07-17 RX ADMIN — MAGNESIUM OXIDE TAB 400 MG (241.3 MG ELEMENTAL MG) 400 MG: 400 (241.3 MG) TAB at 06:42

## 2025-07-17 RX ADMIN — FAMOTIDINE 20 MG: 10 INJECTION, SOLUTION INTRAVENOUS at 08:08

## 2025-07-17 RX ADMIN — HYDROMORPHONE HYDROCHLORIDE 0.5 MG: 1 INJECTION, SOLUTION INTRAMUSCULAR; INTRAVENOUS; SUBCUTANEOUS at 12:13

## 2025-07-17 RX ADMIN — HYDROMORPHONE HYDROCHLORIDE 0.5 MG: 1 INJECTION, SOLUTION INTRAMUSCULAR; INTRAVENOUS; SUBCUTANEOUS at 18:51

## 2025-07-17 RX ADMIN — CIPROFLOXACIN 400 MG: 2 INJECTION, SOLUTION INTRAVENOUS at 01:46

## 2025-07-17 RX ADMIN — SODIUM PHOSPHATE, MONOBASIC, MONOHYDRATE AND SODIUM PHOSPHATE, DIBASIC, ANHYDROUS 9 MMOL: 142; 276 INJECTION, SOLUTION INTRAVENOUS at 08:04

## 2025-07-17 RX ADMIN — PIPERACILLIN AND TAZOBACTAM 4.5 G: 4; .5 INJECTION, POWDER, FOR SOLUTION INTRAVENOUS at 21:40

## 2025-07-17 RX ADMIN — ACETAMINOPHEN 975 MG: 325 TABLET ORAL at 08:12

## 2025-07-17 RX ADMIN — POTASSIUM CHLORIDE 20 MEQ: 20 SOLUTION ORAL at 06:42

## 2025-07-17 RX ADMIN — ACETAMINOPHEN 975 MG: 325 TABLET ORAL at 16:33

## 2025-07-17 RX ADMIN — METRONIDAZOLE 500 MG: 500 INJECTION, SOLUTION INTRAVENOUS at 14:03

## 2025-07-17 RX ADMIN — CIPROFLOXACIN 400 MG: 2 INJECTION, SOLUTION INTRAVENOUS at 14:04

## 2025-07-17 RX ADMIN — SODIUM CHLORIDE, POTASSIUM CHLORIDE, SODIUM LACTATE AND CALCIUM CHLORIDE: 600; 310; 30; 20 INJECTION, SOLUTION INTRAVENOUS at 00:02

## 2025-07-17 RX ADMIN — PIPERACILLIN AND TAZOBACTAM 4.5 G: 4; .5 INJECTION, POWDER, FOR SOLUTION INTRAVENOUS at 15:27

## 2025-07-17 RX ADMIN — SODIUM PHOSPHATE, MONOBASIC, MONOHYDRATE AND SODIUM PHOSPHATE, DIBASIC, ANHYDROUS 9 MMOL: 142; 276 INJECTION, SOLUTION INTRAVENOUS at 16:37

## 2025-07-17 RX ADMIN — PIPERACILLIN AND TAZOBACTAM 4.5 G: 4; .5 INJECTION, POWDER, FOR SOLUTION INTRAVENOUS at 02:56

## 2025-07-17 RX ADMIN — SODIUM CHLORIDE, POTASSIUM CHLORIDE, SODIUM LACTATE AND CALCIUM CHLORIDE: 600; 310; 30; 20 INJECTION, SOLUTION INTRAVENOUS at 11:47

## 2025-07-17 RX ADMIN — METRONIDAZOLE 500 MG: 500 INJECTION, SOLUTION INTRAVENOUS at 02:20

## 2025-07-17 RX ADMIN — FAMOTIDINE 20 MG: 10 INJECTION, SOLUTION INTRAVENOUS at 21:37

## 2025-07-17 RX ADMIN — ENOXAPARIN SODIUM 40 MG: 40 INJECTION SUBCUTANEOUS at 12:07

## 2025-07-17 RX ADMIN — PIPERACILLIN AND TAZOBACTAM 4.5 G: 4; .5 INJECTION, POWDER, FOR SOLUTION INTRAVENOUS at 09:41

## 2025-07-17 ASSESSMENT — ACTIVITIES OF DAILY LIVING (ADL)
ADLS_ACUITY_SCORE: 40
ADLS_ACUITY_SCORE: 38
ADLS_ACUITY_SCORE: 38
ADLS_ACUITY_SCORE: 40
ADLS_ACUITY_SCORE: 38
ADLS_ACUITY_SCORE: 38
ADLS_ACUITY_SCORE: 40
ADLS_ACUITY_SCORE: 40
ADLS_ACUITY_SCORE: 38
ADLS_ACUITY_SCORE: 40
ADLS_ACUITY_SCORE: 38
ADLS_ACUITY_SCORE: 40

## 2025-07-17 NOTE — PHARMACY-MEDICATION REGIMEN REVIEW
Pharmacy Antimicrobial Stewardship Assessment     Current Antimicrobial Therapy:  Anti-infectives (From now, onward)      Start     Dose/Rate Route Frequency Ordered Stop    07/16/25 1500  piperacillin-tazobactam (ZOSYN) 4.5 g vial to attach to  mL bag         4.5 g  over 30 Minutes Intravenous EVERY 6 HOURS 07/16/25 1341      07/16/25 1400  ciprofloxacin (CIPRO) infusion 400 mg         400 mg  over 1 Hours Intravenous EVERY 12 HOURS 07/16/25 1341      07/16/25 1400  metroNIDAZOLE (FLAGYL) infusion 500 mg            500 mg  over 60 Minutes Intravenous EVERY 12 HOURS 07/16/25 1350              Indication: intra-abdominal infection          Recommendations/Interventions:  1. Discussed double coverage therapy with surgeon, and he wishes to continue as ordered.  2. Flagyl dosing changed to every 12 hrs IV per Infectious Disease Medication Dose Adjustment Criteria, as it is for the indication of bacteremia, CNS infection or C.dif.            Cintia Diane, [facility]  July 17, 2025

## 2025-07-17 NOTE — PLAN OF CARE
"Plan of Care Reviewed With: patient     Overall Patient Progress: progressing    BP (!) 161/97   Pulse 59   Temp 98.3  F (36.8  C) (Tympanic)   Resp 15   Ht 1.753 m (5' 9\")   Wt 85.5 kg (188 lb 7.9 oz)   SpO2 97%   BMI 27.84 kg/m      A&O, intermit hallucinations beginning of shift, denies any hallucinations end of shift, VS as charted, BP slightly elevated 150-160s systolic, afebrile, does c/o pain to abd received PRN dilaudid x1 and scheduled tylenol with relief, dressing changed to abd x1 scant dried serosang drainage, bowel sounds hypo active, denies n/v, passing some flatus in colostomy, small amount of liquid bloody in colostomy, NG out this shift, remains NPO, ALISIA to R and L to bulb suction, L with 50cc serosang drainage, R 35 serosang with more serous drainage,  running at 100ml/hr, orourke out, good urine output, voiding spontaneously post orourke removal, using urinal independently, telemetry SR, remains on RA, lungs clear, up in chair, SBA, ambulated in lund with staff as charted, free from falls, bed alarm on, call light within reach, makes needs known.      Face to face report given with opportunity to observe patient.    Report given to JACKIE Sierra RN   7/17/2025  7:07 PM    "

## 2025-07-17 NOTE — PROGRESS NOTES
INPATIENT ROUNDING NOTE  7/17/2025    Patient: Samy Lu    Physician of Record: Taiwo Briceno MD    Admitting diagnosis: Diverticulitis of colon with perforation [K57.20]  Perforated bowel (H) [K63.1]    Procedure(s):  LAPAROTOMY, Takedown Splenic Flexure, Hartmanns Procedure with Creation End Ostomy  COLECTOMY, SIGMOID, OPEN     POD: 2 Days Post-Op    Current Diet: NPO    CURRENT MEDICATIONS:  Continuous Medications:  Current Facility-Administered Medications   Medication Dose Route Frequency Provider Last Rate Last Admin    acetaminophen (TYLENOL) tablet 975 mg  975 mg Oral or NG Tube Q8H Taiwo Briceno MD   975 mg at 07/17/25 0812    ciprofloxacin (CIPRO) infusion 400 mg  400 mg Intravenous Q12H Taiwo Briceno MD   400 mg at 07/17/25 0146    famotidine (PEPCID) tablet 20 mg  20 mg Oral or NG Tube BID Taiwo Briceno MD        Or    famotidine (PEPCID) injection 20 mg  20 mg Intravenous BID Taiwo Briceno MD   20 mg at 07/17/25 0808    HYDROmorphone (PF) (DILAUDID) injection 0.5 mg  0.5 mg Intravenous Q2H PRN Taiwo Briceno MD   0.5 mg at 07/16/25 2339    Or    HYDROmorphone (DILAUDID) injection 0.8 mg  0.8 mg Intravenous Q2H PRN Taiwo Briceno MD   0.8 mg at 07/15/25 2110    lactated ringers infusion   Intravenous Continuous Taiwo Briceno  mL/hr at 07/17/25 0002 New Bag at 07/17/25 0002    lidocaine (LMX4) cream   Topical Q1H PRN Taiwo Briceno MD        lidocaine 1 % 0.1-1 mL  0.1-1 mL Other Q1H PRN Taiwo Briceno MD        metroNIDAZOLE (FLAGYL) infusion 500 mg  500 mg Intravenous Q12H Taiwo Briceno MD   500 mg at 07/17/25 0220    naloxone (NARCAN) injection 0.1 mg  0.1 mg Intravenous Q2 Min PRN Pettinari, Nhung, APRN CRNA        ondansetron (ZOFRAN ODT) ODT tab 4 mg  4 mg Oral Q6H PRN Taiwo Briceno MD        Or    ondansetron (ZOFRAN) injection 4 mg  4 mg Intravenous Q6H PRN Taiwo Briceno MD         piperacillin-tazobactam (ZOSYN) 4.5 g vial to attach to  mL bag  4.5 g Intravenous Q6H Taiwo Briceno MD   4.5 g at 07/17/25 0941    prochlorperazine (COMPAZINE) injection 5 mg  5 mg Intravenous Q6H PRN Taiwo Briceno MD        Or    prochlorperazine (COMPAZINE) tablet 5 mg  5 mg Oral or NG Tube Q6H PRN Taiwo Briceno MD        sodium chloride (PF) 0.9% PF flush 3 mL  3 mL Intracatheter Q8H CASIMIRO Taiwo Briceno MD   3 mL at 07/16/25 1411    sodium chloride (PF) 0.9% PF flush 3 mL  3 mL Intracatheter q1 min prn Taiwo Briceno MD   3 mL at 07/17/25 0146    sodium phosphate 9 mmol in sodium chloride 0.9 % 250 mL intermittent infusion  9 mmol Intravenous Q2H CASIMIRO (EVEN HOURS) Taiwo Briceno MD   9 mmol at 07/17/25 1021       Scheduled Medications:  Current Facility-Administered Medications   Medication Dose Route Frequency Provider Last Rate Last Admin    acetaminophen (TYLENOL) tablet 975 mg  975 mg Oral or NG Tube Q8H Taiwo Briceno MD   975 mg at 07/17/25 0812    ciprofloxacin (CIPRO) infusion 400 mg  400 mg Intravenous Q12H Taiwo Briceno MD   400 mg at 07/17/25 0146    famotidine (PEPCID) tablet 20 mg  20 mg Oral or NG Tube BID Taiwo Briceno MD        Or    famotidine (PEPCID) injection 20 mg  20 mg Intravenous BID Taiwo Briceno MD   20 mg at 07/17/25 0808    HYDROmorphone (PF) (DILAUDID) injection 0.5 mg  0.5 mg Intravenous Q2H PRN Taiwo Briceno MD   0.5 mg at 07/16/25 2339    Or    HYDROmorphone (DILAUDID) injection 0.8 mg  0.8 mg Intravenous Q2H PRN Taiwo Briceno MD   0.8 mg at 07/15/25 2110    lactated ringers infusion   Intravenous Continuous Taiwo Briceno  mL/hr at 07/17/25 0002 New Bag at 07/17/25 0002    lidocaine (LMX4) cream   Topical Q1H PRN Taiwo Briceno MD        lidocaine 1 % 0.1-1 mL  0.1-1 mL Other Q1H PRN Taiwo Briceno MD        metroNIDAZOLE (FLAGYL) infusion  500 mg  500 mg Intravenous Q12H Taiwo Briceno MD   500 mg at 07/17/25 0220    naloxone (NARCAN) injection 0.1 mg  0.1 mg Intravenous Q2 Min PRN Nhung Rivera APRN CRNA        ondansetron (ZOFRAN ODT) ODT tab 4 mg  4 mg Oral Q6H PRN Taiwo Briceno MD        Or    ondansetron (ZOFRAN) injection 4 mg  4 mg Intravenous Q6H PRN Taiwo Briceno MD        piperacillin-tazobactam (ZOSYN) 4.5 g vial to attach to  mL bag  4.5 g Intravenous Q6H Taiwo Briceno MD   4.5 g at 07/17/25 0941    prochlorperazine (COMPAZINE) injection 5 mg  5 mg Intravenous Q6H PRN Taiwo Briceno MD        Or    prochlorperazine (COMPAZINE) tablet 5 mg  5 mg Oral or NG Tube Q6H PRN Taiwo Briceno MD        sodium chloride (PF) 0.9% PF flush 3 mL  3 mL Intracatheter Q8H CASIMIRO Taiwo Briceno MD   3 mL at 07/16/25 1411    sodium chloride (PF) 0.9% PF flush 3 mL  3 mL Intracatheter q1 min prn Taiwo Briceno MD   3 mL at 07/17/25 0146    sodium phosphate 9 mmol in sodium chloride 0.9 % 250 mL intermittent infusion  9 mmol Intravenous Q2H CASIMIRO (EVEN HOURS) Taiwo Briceno MD   9 mmol at 07/17/25 1021       PRN Medications:  Current Facility-Administered Medications   Medication Dose Route Frequency Provider Last Rate Last Admin    acetaminophen (TYLENOL) tablet 975 mg  975 mg Oral or NG Tube Q8H Taiwo Briceno MD   975 mg at 07/17/25 0812    ciprofloxacin (CIPRO) infusion 400 mg  400 mg Intravenous Q12H Taiwo Briceno MD   400 mg at 07/17/25 0146    famotidine (PEPCID) tablet 20 mg  20 mg Oral or NG Tube BID Taiwo Briceno MD        Or    famotidine (PEPCID) injection 20 mg  20 mg Intravenous BID Taiwo Briceno MD   20 mg at 07/17/25 0808    HYDROmorphone (PF) (DILAUDID) injection 0.5 mg  0.5 mg Intravenous Q2H PRN Taiwo Briceno MD   0.5 mg at 07/16/25 1144    Or    HYDROmorphone (DILAUDID) injection 0.8 mg  0.8 mg Intravenous Q2H PRN  "Taiwo Briceno MD   0.8 mg at 07/15/25 2110    lactated ringers infusion   Intravenous Continuous Taiwo Briceno  mL/hr at 07/17/25 0002 New Bag at 07/17/25 0002    lidocaine (LMX4) cream   Topical Q1H PRN Taiwo Briceno MD        lidocaine 1 % 0.1-1 mL  0.1-1 mL Other Q1H PRN Taiwo Briceno MD        metroNIDAZOLE (FLAGYL) infusion 500 mg  500 mg Intravenous Q12H Taiwo Briceno MD   500 mg at 07/17/25 0220    naloxone (NARCAN) injection 0.1 mg  0.1 mg Intravenous Q2 Min PRN Nhung Rivera APRN CRNA        ondansetron (ZOFRAN ODT) ODT tab 4 mg  4 mg Oral Q6H PRN Taiwo Briceno MD        Or    ondansetron (ZOFRAN) injection 4 mg  4 mg Intravenous Q6H PRN Taiwo Briceno MD        piperacillin-tazobactam (ZOSYN) 4.5 g vial to attach to  mL bag  4.5 g Intravenous Q6H Taiwo Briceno MD   4.5 g at 07/17/25 0941    prochlorperazine (COMPAZINE) injection 5 mg  5 mg Intravenous Q6H PRN Taiwo Briceno MD        Or    prochlorperazine (COMPAZINE) tablet 5 mg  5 mg Oral or NG Tube Q6H PRN Taiwo Briceno MD        sodium chloride (PF) 0.9% PF flush 3 mL  3 mL Intracatheter Q8H [facility] Taiwo Briceno MD   3 mL at 07/16/25 1411    sodium chloride (PF) 0.9% PF flush 3 mL  3 mL Intracatheter q1 min prn Taiwo Briceno MD   3 mL at 07/17/25 0146    sodium phosphate 9 mmol in sodium chloride 0.9 % 250 mL intermittent infusion  9 mmol Intravenous Q2H CASIMIRO (EVEN HOURS) Taiwo Briceno MD   9 mmol at 07/17/25 1021       SUBJECTIVE:   Nausea: No. Vomiting: No--NG intact. Fever: No. Chills: No. Excessive burping: No. Flatus: Yes. BM: No. Pain control: good. Tolerating current diet: N/A    PHYSICAL EXAM:   Vital signs: BP (!) 154/83   Pulse 56   Temp 98.8  F (37.1  C) (Tympanic)   Resp 19   Ht 1.753 m (5' 9\")   Wt 85.5 kg (188 lb 7.9 oz)   SpO2 96%   BMI 27.84 kg/m     BMI: Body mass index is 27.84 kg/m .   General: in no " acute distress, alert   Lungs: respirations are non-labored   Abdominal: non-distended; ALISIA drains intact with serosanguineous drainage   Wound: dressing intact to incisional site   Extremities: No cyanosis, clubbing or edema noted bilaterally in Upper and Lower Extremities   Neurological: without deficit    INPUT/OUTPUT:      Intake/Output Summary (Last 24 hours) at 7/17/2025 1045  Last data filed at 7/17/2025 0825  Gross per 24 hour   Intake 2725 ml   Output 4585 ml   Net -1860 ml       I/O last 3 completed shifts:  In: 2725 [I.V.:2515; NG/GT:210]  Out: 3925 [Urine:3270; Emesis/NG output:500; Drains:155]    LABS:    Last CBC Rrsults:   Recent Labs   Lab Test 07/17/25  0446 07/16/25  0456 07/15/25  0528   WBC 17.0* 20.8* 19.9*   RBC 4.83 5.13 4.88   HGB 15.9 16.9 16.0   HCT 43.1 46.9 43.9   MCV 89 91 90   MCH 32.9 32.9 32.8   MCHC 36.9* 36.0 36.4   RDW 12.7 13.1 13.1    218 165       Last Comprehensive Metabolic panel:  Recent Labs   Lab Test 07/17/25  0446 07/16/25  0456 07/15/25  1133 07/15/25  0528   * 133*  --  132*   POTASSIUM 3.8 4.4  --  3.6   CHLORIDE 96* 100  --  99   CO2 22 19*  --  18*   ANIONGAP 13 14  --  15   * 141* 111* 107*   BUN 14.4 11.2  --  9.7   CR 0.87 0.85  --  0.93   GFRESTIMATED >90 >90  --  88   NURIS 7.9* 8.1*  --  8.0*   BILITOTAL 0.9 1.2  --  3.9*   ALKPHOS 55 71  --  74   ALT 28 34  --  43   AST 37 30  --  35       Recent Labs   Lab Test 07/17/25 0446 07/16/25  0456 07/15/25  2307 07/15/25  1454 07/15/25  1224 07/15/25  0528   MAG 1.6* 1.8  --   --  2.1 1.5*   ALBUMIN 2.7* 2.9*  --   --   --  3.1*   PHOS 1.8* 2.8 2.9   < >  --  1.9*    < > = values in this interval not displayed.     ASSESSMENT:    2 Days Post-Op from Procedure(s):  LAPAROTOMY, Takedown Splenic Flexure, Hartmanns Procedure with Creation End Ostomy  COLECTOMY, SIGMOID, OPEN.      PLAN:   Discontinue NG  Continue NPO at this time until passing flatus consisently  Discontinue orourke  Start lovenox  Up in  chair and ambulating

## 2025-07-17 NOTE — PLAN OF CARE
Plan of Care Reviewed With: Patient    Overall Patient Progress: Progressing    VS and assessments as charted. Alert and oriented x4. No hallucinations reported this shift. Intermittent restlessness throughout the night. Reports pain in the abdomen, rated 2-6/10. PRN Dilaudid given x1. Continues to receive scheduled Tylenol. O2 sats WDL on RA. SB/SR 50s-60s. NPO. NG to LIS; 76 cm at the nare. Midline abdominal incision; dressing intact with 2 small shadow areas of drainage noted; no change this shift. Abdomen rounded, but soft. BS hypoactive. No nausea/vomiting. Colostomy left abdomen; small amount air in bag. Scant amount bloody liquid in bag. ALISIA drain x2; serosanguinous output. Starkey catheter patent; significant urine output this shift. Free from falls and/or injury this shift. Call light within reach. Bed locked and low; alarm on all night. Pt up in chair this morning; chair alarm on.    Face to face report given with opportunity to observe patient.  Report given to JACKIE Friedman.    Matt Fisher RN  7/17/2025, 7:29 AM

## 2025-07-18 LAB
ALBUMIN SERPL BCG-MCNC: 3 G/DL (ref 3.5–5.2)
ALP SERPL-CCNC: 57 U/L (ref 40–150)
ALT SERPL W P-5'-P-CCNC: 33 U/L (ref 0–70)
ANION GAP SERPL CALCULATED.3IONS-SCNC: 15 MMOL/L (ref 7–15)
AST SERPL W P-5'-P-CCNC: 31 U/L (ref 0–45)
BACTERIA SPEC CULT: NO GROWTH
BACTERIA SPEC CULT: NO GROWTH
BASOPHILS # BLD AUTO: 0.1 10E3/UL (ref 0–0.2)
BASOPHILS NFR BLD AUTO: 0 %
BILIRUB SERPL-MCNC: 0.9 MG/DL
BUN SERPL-MCNC: 11.5 MG/DL (ref 8–23)
CALCIUM SERPL-MCNC: 8.1 MG/DL (ref 8.8–10.4)
CHLORIDE SERPL-SCNC: 96 MMOL/L (ref 98–107)
CREAT SERPL-MCNC: 0.85 MG/DL (ref 0.67–1.17)
EGFRCR SERPLBLD CKD-EPI 2021: >90 ML/MIN/1.73M2
EOSINOPHIL # BLD AUTO: 0.2 10E3/UL (ref 0–0.7)
EOSINOPHIL NFR BLD AUTO: 1 %
ERYTHROCYTE [DISTWIDTH] IN BLOOD BY AUTOMATED COUNT: 12.5 % (ref 10–15)
GLUCOSE SERPL-MCNC: 94 MG/DL (ref 70–99)
HCO3 SERPL-SCNC: 21 MMOL/L (ref 22–29)
HCT VFR BLD AUTO: 45 % (ref 40–53)
HGB BLD-MCNC: 16.6 G/DL (ref 13.3–17.7)
IMM GRANULOCYTES # BLD: 0.1 10E3/UL
IMM GRANULOCYTES NFR BLD: 1 %
LYMPHOCYTES # BLD AUTO: 1.8 10E3/UL (ref 0.8–5.3)
LYMPHOCYTES NFR BLD AUTO: 14 %
MAGNESIUM SERPL-MCNC: 1.6 MG/DL (ref 1.7–2.3)
MCH RBC QN AUTO: 32.7 PG (ref 26.5–33)
MCHC RBC AUTO-ENTMCNC: 36.9 G/DL (ref 31.5–36.5)
MCV RBC AUTO: 89 FL (ref 78–100)
MONOCYTES # BLD AUTO: 1.6 10E3/UL (ref 0–1.3)
MONOCYTES NFR BLD AUTO: 13 %
NEUTROPHILS # BLD AUTO: 9 10E3/UL (ref 1.6–8.3)
NEUTROPHILS NFR BLD AUTO: 71 %
NRBC # BLD AUTO: 0 10E3/UL
NRBC BLD AUTO-RTO: 0 /100
PHOSPHATE SERPL-MCNC: 3 MG/DL (ref 2.5–4.5)
PLAT MORPH BLD: NORMAL
PLATELET # BLD AUTO: 252 10E3/UL (ref 150–450)
POTASSIUM SERPL-SCNC: 3.5 MMOL/L (ref 3.4–5.3)
PROT SERPL-MCNC: 5.7 G/DL (ref 6.4–8.3)
RBC # BLD AUTO: 5.08 10E6/UL (ref 4.4–5.9)
RBC MORPH BLD: NORMAL
SODIUM SERPL-SCNC: 132 MMOL/L (ref 135–145)
WBC # BLD AUTO: 12.7 10E3/UL (ref 4–11)

## 2025-07-18 PROCEDURE — 120N000001 HC R&B MED SURG/OB

## 2025-07-18 PROCEDURE — 250N000013 HC RX MED GY IP 250 OP 250 PS 637: Performed by: SURGERY

## 2025-07-18 PROCEDURE — 36415 COLL VENOUS BLD VENIPUNCTURE: CPT | Performed by: SURGERY

## 2025-07-18 PROCEDURE — 250N000011 HC RX IP 250 OP 636: Performed by: NURSE PRACTITIONER

## 2025-07-18 PROCEDURE — 84100 ASSAY OF PHOSPHORUS: CPT | Performed by: SURGERY

## 2025-07-18 PROCEDURE — 250N000011 HC RX IP 250 OP 636: Performed by: SURGERY

## 2025-07-18 PROCEDURE — 84155 ASSAY OF PROTEIN SERUM: CPT | Performed by: SURGERY

## 2025-07-18 PROCEDURE — 258N000003 HC RX IP 258 OP 636: Performed by: SURGERY

## 2025-07-18 PROCEDURE — 83735 ASSAY OF MAGNESIUM: CPT | Performed by: SURGERY

## 2025-07-18 PROCEDURE — 999N000157 HC STATISTIC RCP TIME EA 10 MIN

## 2025-07-18 PROCEDURE — 85004 AUTOMATED DIFF WBC COUNT: CPT | Performed by: SURGERY

## 2025-07-18 RX ORDER — POTASSIUM CHLORIDE 1500 MG/1
20 TABLET, EXTENDED RELEASE ORAL ONCE
Status: COMPLETED | OUTPATIENT
Start: 2025-07-18 | End: 2025-07-18

## 2025-07-18 RX ORDER — MAGNESIUM OXIDE 400 MG/1
400 TABLET ORAL EVERY 4 HOURS
Status: COMPLETED | OUTPATIENT
Start: 2025-07-18 | End: 2025-07-18

## 2025-07-18 RX ORDER — OXYCODONE HYDROCHLORIDE 5 MG/1
5 TABLET ORAL EVERY 4 HOURS PRN
Refills: 0 | Status: DISCONTINUED | OUTPATIENT
Start: 2025-07-18 | End: 2025-07-21 | Stop reason: HOSPADM

## 2025-07-18 RX ADMIN — ACETAMINOPHEN 975 MG: 325 TABLET ORAL at 15:36

## 2025-07-18 RX ADMIN — PIPERACILLIN AND TAZOBACTAM 4.5 G: 4; .5 INJECTION, POWDER, FOR SOLUTION INTRAVENOUS at 16:54

## 2025-07-18 RX ADMIN — MAGNESIUM OXIDE TAB 400 MG (241.3 MG ELEMENTAL MG) 400 MG: 400 (241.3 MG) TAB at 08:04

## 2025-07-18 RX ADMIN — ACETAMINOPHEN 975 MG: 325 TABLET ORAL at 00:46

## 2025-07-18 RX ADMIN — CIPROFLOXACIN 400 MG: 2 INJECTION, SOLUTION INTRAVENOUS at 14:26

## 2025-07-18 RX ADMIN — METRONIDAZOLE 500 MG: 500 INJECTION, SOLUTION INTRAVENOUS at 15:39

## 2025-07-18 RX ADMIN — ACETAMINOPHEN 975 MG: 325 TABLET ORAL at 08:04

## 2025-07-18 RX ADMIN — PIPERACILLIN AND TAZOBACTAM 4.5 G: 4; .5 INJECTION, POWDER, FOR SOLUTION INTRAVENOUS at 08:08

## 2025-07-18 RX ADMIN — ENOXAPARIN SODIUM 40 MG: 40 INJECTION SUBCUTANEOUS at 11:18

## 2025-07-18 RX ADMIN — PIPERACILLIN AND TAZOBACTAM 4.5 G: 4; .5 INJECTION, POWDER, FOR SOLUTION INTRAVENOUS at 04:20

## 2025-07-18 RX ADMIN — POTASSIUM CHLORIDE EXTENDED-RELEASE 20 MEQ: 1500 TABLET ORAL at 08:04

## 2025-07-18 RX ADMIN — OXYCODONE HYDROCHLORIDE 5 MG: 5 TABLET ORAL at 23:38

## 2025-07-18 RX ADMIN — FAMOTIDINE 20 MG: 20 TABLET, FILM COATED ORAL at 20:29

## 2025-07-18 RX ADMIN — METRONIDAZOLE 500 MG: 500 INJECTION, SOLUTION INTRAVENOUS at 01:52

## 2025-07-18 RX ADMIN — CIPROFLOXACIN 400 MG: 2 INJECTION, SOLUTION INTRAVENOUS at 01:51

## 2025-07-18 RX ADMIN — SODIUM CHLORIDE, POTASSIUM CHLORIDE, SODIUM LACTATE AND CALCIUM CHLORIDE: 600; 310; 30; 20 INJECTION, SOLUTION INTRAVENOUS at 11:18

## 2025-07-18 RX ADMIN — HYDROMORPHONE HYDROCHLORIDE 0.5 MG: 1 INJECTION, SOLUTION INTRAMUSCULAR; INTRAVENOUS; SUBCUTANEOUS at 00:45

## 2025-07-18 RX ADMIN — ACETAMINOPHEN 975 MG: 325 TABLET ORAL at 23:38

## 2025-07-18 RX ADMIN — MAGNESIUM OXIDE TAB 400 MG (241.3 MG ELEMENTAL MG) 400 MG: 400 (241.3 MG) TAB at 11:18

## 2025-07-18 RX ADMIN — PIPERACILLIN AND TAZOBACTAM 4.5 G: 4; .5 INJECTION, POWDER, FOR SOLUTION INTRAVENOUS at 20:30

## 2025-07-18 RX ADMIN — OXYCODONE HYDROCHLORIDE 5 MG: 5 TABLET ORAL at 18:31

## 2025-07-18 RX ADMIN — FAMOTIDINE 20 MG: 20 TABLET, FILM COATED ORAL at 08:04

## 2025-07-18 RX ADMIN — HYDROMORPHONE HYDROCHLORIDE 0.5 MG: 1 INJECTION, SOLUTION INTRAMUSCULAR; INTRAVENOUS; SUBCUTANEOUS at 06:41

## 2025-07-18 ASSESSMENT — ACTIVITIES OF DAILY LIVING (ADL)
ADLS_ACUITY_SCORE: 34
ADLS_ACUITY_SCORE: 35
ADLS_ACUITY_SCORE: 36
ADLS_ACUITY_SCORE: 34
ADLS_ACUITY_SCORE: 35
ADLS_ACUITY_SCORE: 34
ADLS_ACUITY_SCORE: 35
ADLS_ACUITY_SCORE: 34
ADLS_ACUITY_SCORE: 36
ADLS_ACUITY_SCORE: 34
ADLS_ACUITY_SCORE: 34
ADLS_ACUITY_SCORE: 35
ADLS_ACUITY_SCORE: 34
ADLS_ACUITY_SCORE: 34

## 2025-07-18 NOTE — PLAN OF CARE
Plan of Care Reviewed With: patient    Overall Patient Progress: progressing     PIVs x2. LR infusing at 100 mL/hr. Abx - IV Cipro, Flagyl, and Zosyn. Vitals and assessments as charted. Pain to abd 2-5/10 managed with PO tylenol and IV dilaudid x1. Tele reading SD in the 60s. SBPs 140-150s. Has had no episodes or confusion or hallucinations. Bed alarms remain on for pt safety. BSs audible this AM to upper quadrants. Remain hypoactive to lower quadrants. LLQ colostomy burped. Scant liquid dark red/black stool - not enough to measure. Left ALISIA drain - 40 mL serosanguinous drainage. Right ALISIA drain - 35 mLs serous drainage. Remains NPO with meds okay. Urinal voiding with adequate output. SBA. Bed in lowest position. Call light in reach. ID band in place. Makes needs known. Up in chair this morning.     Face to face report given with opportunity to observe patient.    Report given to JACKIE Aceves RN   7/18/2025  7:10 AM

## 2025-07-18 NOTE — PROGRESS NOTES
INPATIENT ROUNDING NOTE  7/18/2025    Patient: Samy Lu    Physician of Record: Taiwo Briceno MD    Admitting diagnosis: Diverticulitis of colon with perforation [K57.20]  Perforated bowel (H) [K63.1]    Procedure(s):  LAPAROTOMY, Takedown Splenic Flexure, Hartmanns Procedure with Creation End Ostomy  COLECTOMY, SIGMOID, OPEN     POD: 3 Days Post-Op    Current Diet: NPO    CURRENT MEDICATIONS:  Continuous Medications:  Current Facility-Administered Medications   Medication Dose Route Frequency Provider Last Rate Last Admin    acetaminophen (TYLENOL) tablet 975 mg  975 mg Oral or NG Tube Q8H Taiwo Briceno MD   975 mg at 07/18/25 0804    ciprofloxacin (CIPRO) infusion 400 mg  400 mg Intravenous Q12H Taiwo Briceno MD   400 mg at 07/18/25 0151    enoxaparin ANTICOAGULANT (LOVENOX) injection 40 mg  40 mg Subcutaneous Q24H Lo Bangura NP   40 mg at 07/17/25 1207    famotidine (PEPCID) tablet 20 mg  20 mg Oral or NG Tube BID Taiwo Briceno MD   20 mg at 07/18/25 0804    Or    famotidine (PEPCID) injection 20 mg  20 mg Intravenous BID Taiwo Briceno MD   20 mg at 07/17/25 2137    HYDROmorphone (PF) (DILAUDID) injection 0.5 mg  0.5 mg Intravenous Q2H PRN Taiwo Briceno MD   0.5 mg at 07/18/25 0641    Or    HYDROmorphone (DILAUDID) injection 0.8 mg  0.8 mg Intravenous Q2H PRN Taiwo Briceno MD   0.8 mg at 07/15/25 2110    lactated ringers infusion   Intravenous Continuous Taiwo Briceno  mL/hr at 07/18/25 0610 Rate Verify at 07/18/25 0610    lidocaine (LMX4) cream   Topical Q1H PRN Taiwo Briceno MD        lidocaine 1 % 0.1-1 mL  0.1-1 mL Other Q1H PRN Taiwo Briceno MD        magnesium oxide (MAG-OX) tablet 400 mg  400 mg Oral Q4H Taiwo Briceno MD   400 mg at 07/18/25 0804    metroNIDAZOLE (FLAGYL) infusion 500 mg  500 mg Intravenous Q12H Taiwo Briceno MD   500 mg at 07/18/25 0152    naloxone (NARCAN)  injection 0.1 mg  0.1 mg Intravenous Q2 Min PRN Nhung Rivera, APRN CRNA        ondansetron (ZOFRAN ODT) ODT tab 4 mg  4 mg Oral Q6H PRN Taiwo Briceno MD        Or    ondansetron (ZOFRAN) injection 4 mg  4 mg Intravenous Q6H PRN Taiwo Briceno MD        piperacillin-tazobactam (ZOSYN) 4.5 g vial to attach to  mL bag  4.5 g Intravenous Q6H Taiwo Briceno MD   4.5 g at 07/18/25 0808    prochlorperazine (COMPAZINE) injection 5 mg  5 mg Intravenous Q6H PRN Taiwo Briceno MD        Or    prochlorperazine (COMPAZINE) tablet 5 mg  5 mg Oral or NG Tube Q6H PRN Taiwo Briceno MD        sodium chloride (PF) 0.9% PF flush 3 mL  3 mL Intracatheter Q8H CASIMIRO Taiwo Briceno MD   3 mL at 07/17/25 1406    sodium chloride (PF) 0.9% PF flush 3 mL  3 mL Intracatheter q1 min prn Taiwo Briceno MD   3 mL at 07/17/25 0146       Scheduled Medications:  Current Facility-Administered Medications   Medication Dose Route Frequency Provider Last Rate Last Admin    acetaminophen (TYLENOL) tablet 975 mg  975 mg Oral or NG Tube Q8H Taiwo Briceno MD   975 mg at 07/18/25 0804    ciprofloxacin (CIPRO) infusion 400 mg  400 mg Intravenous Q12H Taiwo Briceno MD   400 mg at 07/18/25 0151    enoxaparin ANTICOAGULANT (LOVENOX) injection 40 mg  40 mg Subcutaneous Q24H Lo Bangura NP   40 mg at 07/17/25 1207    famotidine (PEPCID) tablet 20 mg  20 mg Oral or NG Tube BID Taiwo Briceno MD   20 mg at 07/18/25 0804    Or    famotidine (PEPCID) injection 20 mg  20 mg Intravenous BID Taiwo Briceno MD   20 mg at 07/17/25 2137    HYDROmorphone (PF) (DILAUDID) injection 0.5 mg  0.5 mg Intravenous Q2H PRN Taiwo Briceno MD   0.5 mg at 07/18/25 0641    Or    HYDROmorphone (DILAUDID) injection 0.8 mg  0.8 mg Intravenous Q2H PRTaiwo Suarez MD   0.8 mg at 07/15/25 2110    lactated ringers infusion   Intravenous Continuous Taiwo Briceno,   mL/hr at 07/18/25 0610 Rate Verify at 07/18/25 0610    lidocaine (LMX4) cream   Topical Q1H PRN Taiwo Briceno MD        lidocaine 1 % 0.1-1 mL  0.1-1 mL Other Q1H PRN Taiwo Briceno MD        magnesium oxide (MAG-OX) tablet 400 mg  400 mg Oral Q4H Taiwo Briceno MD   400 mg at 07/18/25 0804    metroNIDAZOLE (FLAGYL) infusion 500 mg  500 mg Intravenous Q12H Taiwo Briceno MD   500 mg at 07/18/25 0152    naloxone (NARCAN) injection 0.1 mg  0.1 mg Intravenous Q2 Min PRN Nhung Rivera APRN CRNA        ondansetron (ZOFRAN ODT) ODT tab 4 mg  4 mg Oral Q6H PRN Taiwo Briceno MD        Or    ondansetron (ZOFRAN) injection 4 mg  4 mg Intravenous Q6H PRN Taiwo Briceno MD        piperacillin-tazobactam (ZOSYN) 4.5 g vial to attach to  mL bag  4.5 g Intravenous Q6H Taiwo Briceno MD   4.5 g at 07/18/25 0808    prochlorperazine (COMPAZINE) injection 5 mg  5 mg Intravenous Q6H PRN Taiwo Briceno MD        Or    prochlorperazine (COMPAZINE) tablet 5 mg  5 mg Oral or NG Tube Q6H PRN Taiwo Briceno MD        sodium chloride (PF) 0.9% PF flush 3 mL  3 mL Intracatheter Q8H [facility] Taiwo Briceno MD   3 mL at 07/17/25 1406    sodium chloride (PF) 0.9% PF flush 3 mL  3 mL Intracatheter q1 min prn Taiwo Briceno MD   3 mL at 07/17/25 0146       PRN Medications:  Current Facility-Administered Medications   Medication Dose Route Frequency Provider Last Rate Last Admin    acetaminophen (TYLENOL) tablet 975 mg  975 mg Oral or NG Tube Q8H Taiwo Briceno MD   975 mg at 07/18/25 0804    ciprofloxacin (CIPRO) infusion 400 mg  400 mg Intravenous Q12H Taiwo Briceno MD   400 mg at 07/18/25 0151    enoxaparin ANTICOAGULANT (LOVENOX) injection 40 mg  40 mg Subcutaneous Q24H Lo Bangura NP   40 mg at 07/17/25 1207    famotidine (PEPCID) tablet 20 mg  20 mg Oral or NG Tube BID Taiwo Briceno MD   20 mg at 07/18/25  0804    Or    famotidine (PEPCID) injection 20 mg  20 mg Intravenous BID Taiwo Briceno MD   20 mg at 07/17/25 2137    HYDROmorphone (PF) (DILAUDID) injection 0.5 mg  0.5 mg Intravenous Q2H PRN Taiwo Briceno MD   0.5 mg at 07/18/25 0641    Or    HYDROmorphone (DILAUDID) injection 0.8 mg  0.8 mg Intravenous Q2H PRN Taiwo Briceno MD   0.8 mg at 07/15/25 2110    lactated ringers infusion   Intravenous Continuous Taiwo Briceno  mL/hr at 07/18/25 0610 Rate Verify at 07/18/25 0610    lidocaine (LMX4) cream   Topical Q1H PRN Taiwo Briceno MD        lidocaine 1 % 0.1-1 mL  0.1-1 mL Other Q1H PRN Taiwo Briceno MD        magnesium oxide (MAG-OX) tablet 400 mg  400 mg Oral Q4H Taiwo Briceno MD   400 mg at 07/18/25 0804    metroNIDAZOLE (FLAGYL) infusion 500 mg  500 mg Intravenous Q12H Taiwo Briceno MD   500 mg at 07/18/25 0152    naloxone (NARCAN) injection 0.1 mg  0.1 mg Intravenous Q2 Min PRN Nhung Rivera APRN CRNA        ondansetron (ZOFRAN ODT) ODT tab 4 mg  4 mg Oral Q6H PRN Taiwo Briceno MD        Or    ondansetron (ZOFRAN) injection 4 mg  4 mg Intravenous Q6H PRN Taiwo Briceno MD        piperacillin-tazobactam (ZOSYN) 4.5 g vial to attach to  mL bag  4.5 g Intravenous Q6H Taiwo Briceno MD   4.5 g at 07/18/25 0808    prochlorperazine (COMPAZINE) injection 5 mg  5 mg Intravenous Q6H PRN Taiwo Briceno MD        Or    prochlorperazine (COMPAZINE) tablet 5 mg  5 mg Oral or NG Tube Q6H PRN Taiwo Briceno MD        sodium chloride (PF) 0.9% PF flush 3 mL  3 mL Intracatheter Q8H [facility] Taiwo Briceno MD   3 mL at 07/17/25 1406    sodium chloride (PF) 0.9% PF flush 3 mL  3 mL Intracatheter q1 min prn Taiwo Briceno MD   3 mL at 07/17/25 0146       SUBJECTIVE:   Nausea: No. Vomiting: No. Fever: No. Chills: No. Excessive burping: No. Flatus: Yes ( in bag). BM: Yes (in bag). Pain is 4/10.  "Pain control: good. Tolerating current diet: Yes.  Overall doing well.    PHYSICAL EXAM:   Vital signs: BP (!) 129/96   Pulse 71   Temp 97.2  F (36.2  C) (Tympanic)   Resp 16   Ht 1.753 m (5' 9\")   Wt 77.1 kg (169 lb 15.6 oz)   SpO2 96%   BMI 25.10 kg/m     Weight: [unfilled]   BMI: Body mass index is 25.1 kg/m .   General: Normal, healthy, cooperative, in no acute distress, alert   HEENT: PERRLA and EOMI   Neck: supple   Lungs: clear to auscultation   CV: Regular rate and rhythm without murmer   Abdominal: Abdomen soft, minimally tender. BS normal. No masses, organomegaly   Wound: Closed wound. Clean, dry and intact. Healing well.  Drains are serosanguineous.   Extremities: No cyanosis, clubbing or edema noted bilaterally in Upper and Lower Extremities   Neurological: without deficit    INPUT/OUTPUT:      Intake/Output Summary (Last 24 hours) at 7/18/2025 1100  Last data filed at 7/18/2025 0800  Gross per 24 hour   Intake 4423.34 ml   Output 995 ml   Net 3428.34 ml       I/O last 3 completed shifts:  In: 4483.34 [I.V.:4023.34; NG/GT:60; IV Piggyback:400]  Out: 1680 [Urine:1500; Drains:180]    LABS:    Last CBC Rrsults:   Recent Labs   Lab Test 07/18/25  0507 07/17/25  0446 07/16/25  0456   WBC 12.7* 17.0* 20.8*   RBC 5.08 4.83 5.13   HGB 16.6 15.9 16.9   HCT 45.0 43.1 46.9   MCV 89 89 91   MCH 32.7 32.9 32.9   MCHC 36.9* 36.9* 36.0   RDW 12.5 12.7 13.1    233 218       Last Comprehensive Metabolic panel:  Recent Labs   Lab Test 07/18/25  0507 07/17/25  0446 07/16/25  0456   * 131* 133*   POTASSIUM 3.5 3.8 4.4   CHLORIDE 96* 96* 100   CO2 21* 22 19*   ANIONGAP 15 13 14   GLC 94 114* 141*   BUN 11.5 14.4 11.2   CR 0.85 0.87 0.85   GFRESTIMATED >90 >90 >90   NURIS 8.1* 7.9* 8.1*   BILITOTAL 0.9 0.9 1.2   ALKPHOS 57 55 71   ALT 33 28 34   AST 31 37 30       Recent Labs   Lab Test 07/18/25  0507 07/17/25  1420 07/17/25  0446 07/16/25  0456   MAG 1.6*  --  1.6* 1.8   ALBUMIN 3.0*  --  2.7* 2.9*   PHOS 3.0 " 2.6 1.8* 2.8     ASSESSMENT:    3 Days Post-Op from Procedure(s):  LAPAROTOMY, Takedown Splenic Flexure, Hartmanns Procedure with Creation End Ostomy  COLECTOMY, SIGMOID, OPEN.     Relatively well.  Labs are normalizing.    PLAN: Will go ahead and advance to clear liquid diet today.  If he continues to do well we will advance to regular diet tomorrow.  Will continue on IV antibiotics until labs are normalized and then will switch over to p.o. antibiotics will give a total of 10 days antibiotics postsurgery.    Will move to stepdown.

## 2025-07-18 NOTE — PHARMACY-MEDICATION REGIMEN REVIEW
Pharmacy Antimicrobial Stewardship Assessment     Current Antimicrobial Therapy:                Recommendations/Interventions:  1. Consider discontinuing Zosyn as patient continues to improve.    Cintia Diane RPH  July 18, 2025

## 2025-07-18 NOTE — PLAN OF CARE
Plan of Care Reviewed With: Patient    Overall Patient Progress: Improving    Patient A&Ox4. Afebrile. VSS. 2-4/10 pain to abd. PRN oxycodone given x1. Scheduled Tylenol given with relief. LR running at 50 mL to PIV. LS clear, on RA. HRR SR 60's-70's. Abd soft, tender, bs audible throughout. LLQ colostomy intact with 225cc out and patient self emptied x1 into toilet, unable to measure. Left ALISIA drain 30 mL serosanguinous drainage. Right ALISIA drain 20 mLs serous drainage. Clear liquid diet, tolerating well. Voiding adequately using urinal. SBA. Up to chair most of the day, went for a walk in the hallway x2. Alarms on, call light within reach. Colostomy education done with patient and family.    Face to face report given with opportunity to observe patient.    Report given to Milena MEJIA.    Km Norris RN   7/18/2025  7:00 PM

## 2025-07-19 LAB
ALBUMIN SERPL BCG-MCNC: 3.1 G/DL (ref 3.5–5.2)
ALP SERPL-CCNC: 53 U/L (ref 40–150)
ALT SERPL W P-5'-P-CCNC: 37 U/L (ref 0–70)
ANION GAP SERPL CALCULATED.3IONS-SCNC: 13 MMOL/L (ref 7–15)
AST SERPL W P-5'-P-CCNC: 32 U/L (ref 0–45)
BASOPHILS # BLD MANUAL: 0 10E3/UL (ref 0–0.2)
BASOPHILS NFR BLD MANUAL: 0 %
BILIRUB SERPL-MCNC: 0.9 MG/DL
BUN SERPL-MCNC: 8.5 MG/DL (ref 8–23)
CALCIUM SERPL-MCNC: 8.3 MG/DL (ref 8.8–10.4)
CHLORIDE SERPL-SCNC: 98 MMOL/L (ref 98–107)
CREAT SERPL-MCNC: 0.86 MG/DL (ref 0.67–1.17)
EGFRCR SERPLBLD CKD-EPI 2021: >90 ML/MIN/1.73M2
EOSINOPHIL # BLD MANUAL: 0.2 10E3/UL (ref 0–0.7)
EOSINOPHIL NFR BLD MANUAL: 2 %
ERYTHROCYTE [DISTWIDTH] IN BLOOD BY AUTOMATED COUNT: 12.4 % (ref 10–15)
GLUCOSE SERPL-MCNC: 109 MG/DL (ref 70–99)
HCO3 SERPL-SCNC: 21 MMOL/L (ref 22–29)
HCT VFR BLD AUTO: 47.2 % (ref 40–53)
HGB BLD-MCNC: 17.2 G/DL (ref 13.3–17.7)
LYMPHOCYTES # BLD MANUAL: 1.2 10E3/UL (ref 0.8–5.3)
LYMPHOCYTES NFR BLD MANUAL: 10 %
MAGNESIUM SERPL-MCNC: 1.7 MG/DL (ref 1.7–2.3)
MCH RBC QN AUTO: 32.2 PG (ref 26.5–33)
MCHC RBC AUTO-ENTMCNC: 36.4 G/DL (ref 31.5–36.5)
MCV RBC AUTO: 88 FL (ref 78–100)
MONOCYTES # BLD MANUAL: 1.3 10E3/UL (ref 0–1.3)
MONOCYTES NFR BLD MANUAL: 11 %
NEUTROPHILS # BLD MANUAL: 9.2 10E3/UL (ref 1.6–8.3)
NEUTROPHILS NFR BLD MANUAL: 77 %
PHOSPHATE SERPL-MCNC: 3.7 MG/DL (ref 2.5–4.5)
PLAT MORPH BLD: NORMAL
PLATELET # BLD AUTO: 281 10E3/UL (ref 150–450)
POTASSIUM SERPL-SCNC: 3.6 MMOL/L (ref 3.4–5.3)
PROT SERPL-MCNC: 5.9 G/DL (ref 6.4–8.3)
RBC # BLD AUTO: 5.34 10E6/UL (ref 4.4–5.9)
RBC MORPH BLD: NORMAL
SODIUM SERPL-SCNC: 132 MMOL/L (ref 135–145)
WBC # BLD AUTO: 12 10E3/UL (ref 4–11)

## 2025-07-19 PROCEDURE — 85007 BL SMEAR W/DIFF WBC COUNT: CPT | Performed by: SURGERY

## 2025-07-19 PROCEDURE — 36415 COLL VENOUS BLD VENIPUNCTURE: CPT | Performed by: SURGERY

## 2025-07-19 PROCEDURE — 250N000011 HC RX IP 250 OP 636: Performed by: NURSE PRACTITIONER

## 2025-07-19 PROCEDURE — 83735 ASSAY OF MAGNESIUM: CPT | Performed by: SURGERY

## 2025-07-19 PROCEDURE — 250N000013 HC RX MED GY IP 250 OP 250 PS 637: Performed by: SURGERY

## 2025-07-19 PROCEDURE — 84155 ASSAY OF PROTEIN SERUM: CPT | Performed by: SURGERY

## 2025-07-19 PROCEDURE — 250N000011 HC RX IP 250 OP 636: Performed by: SURGERY

## 2025-07-19 PROCEDURE — 85018 HEMOGLOBIN: CPT | Performed by: SURGERY

## 2025-07-19 PROCEDURE — 120N000001 HC R&B MED SURG/OB

## 2025-07-19 PROCEDURE — 80069 RENAL FUNCTION PANEL: CPT | Performed by: SURGERY

## 2025-07-19 RX ORDER — HYDROMORPHONE HYDROCHLORIDE 1 MG/ML
0.5 INJECTION, SOLUTION INTRAMUSCULAR; INTRAVENOUS; SUBCUTANEOUS
Status: DISCONTINUED | OUTPATIENT
Start: 2025-07-19 | End: 2025-07-21 | Stop reason: HOSPADM

## 2025-07-19 RX ORDER — MAGNESIUM OXIDE 400 MG/1
400 TABLET ORAL EVERY 4 HOURS
Status: COMPLETED | OUTPATIENT
Start: 2025-07-19 | End: 2025-07-19

## 2025-07-19 RX ORDER — POTASSIUM CHLORIDE 1500 MG/1
20 TABLET, EXTENDED RELEASE ORAL ONCE
Status: COMPLETED | OUTPATIENT
Start: 2025-07-19 | End: 2025-07-19

## 2025-07-19 RX ORDER — HYDROMORPHONE HCL IN WATER/PF 6 MG/30 ML
0.2 PATIENT CONTROLLED ANALGESIA SYRINGE INTRAVENOUS
Status: DISCONTINUED | OUTPATIENT
Start: 2025-07-19 | End: 2025-07-21 | Stop reason: HOSPADM

## 2025-07-19 RX ADMIN — POTASSIUM CHLORIDE EXTENDED-RELEASE 20 MEQ: 1500 TABLET ORAL at 06:33

## 2025-07-19 RX ADMIN — OXYCODONE HYDROCHLORIDE 5 MG: 5 TABLET ORAL at 13:44

## 2025-07-19 RX ADMIN — ACETAMINOPHEN 975 MG: 325 TABLET ORAL at 09:18

## 2025-07-19 RX ADMIN — PIPERACILLIN AND TAZOBACTAM 4.5 G: 4; .5 INJECTION, POWDER, FOR SOLUTION INTRAVENOUS at 09:19

## 2025-07-19 RX ADMIN — PIPERACILLIN AND TAZOBACTAM 4.5 G: 4; .5 INJECTION, POWDER, FOR SOLUTION INTRAVENOUS at 20:46

## 2025-07-19 RX ADMIN — ENOXAPARIN SODIUM 40 MG: 40 INJECTION SUBCUTANEOUS at 12:44

## 2025-07-19 RX ADMIN — PIPERACILLIN AND TAZOBACTAM 4.5 G: 4; .5 INJECTION, POWDER, FOR SOLUTION INTRAVENOUS at 15:42

## 2025-07-19 RX ADMIN — METRONIDAZOLE 500 MG: 500 INJECTION, SOLUTION INTRAVENOUS at 14:31

## 2025-07-19 RX ADMIN — PIPERACILLIN AND TAZOBACTAM 4.5 G: 4; .5 INJECTION, POWDER, FOR SOLUTION INTRAVENOUS at 03:26

## 2025-07-19 RX ADMIN — ACETAMINOPHEN 975 MG: 325 TABLET ORAL at 23:38

## 2025-07-19 RX ADMIN — CIPROFLOXACIN 400 MG: 2 INJECTION, SOLUTION INTRAVENOUS at 01:54

## 2025-07-19 RX ADMIN — ACETAMINOPHEN 975 MG: 325 TABLET ORAL at 15:42

## 2025-07-19 RX ADMIN — CIPROFLOXACIN 400 MG: 2 INJECTION, SOLUTION INTRAVENOUS at 13:24

## 2025-07-19 RX ADMIN — FAMOTIDINE 20 MG: 20 TABLET, FILM COATED ORAL at 09:18

## 2025-07-19 RX ADMIN — OXYCODONE HYDROCHLORIDE 5 MG: 5 TABLET ORAL at 09:18

## 2025-07-19 RX ADMIN — MAGNESIUM OXIDE TAB 400 MG (241.3 MG ELEMENTAL MG) 400 MG: 400 (241.3 MG) TAB at 06:33

## 2025-07-19 RX ADMIN — OXYCODONE HYDROCHLORIDE 5 MG: 5 TABLET ORAL at 19:14

## 2025-07-19 RX ADMIN — OXYCODONE HYDROCHLORIDE 5 MG: 5 TABLET ORAL at 23:38

## 2025-07-19 RX ADMIN — FAMOTIDINE 20 MG: 20 TABLET, FILM COATED ORAL at 19:57

## 2025-07-19 RX ADMIN — METRONIDAZOLE 500 MG: 500 INJECTION, SOLUTION INTRAVENOUS at 01:54

## 2025-07-19 RX ADMIN — MAGNESIUM OXIDE TAB 400 MG (241.3 MG ELEMENTAL MG) 400 MG: 400 (241.3 MG) TAB at 09:30

## 2025-07-19 ASSESSMENT — ACTIVITIES OF DAILY LIVING (ADL)
ADLS_ACUITY_SCORE: 34
ADLS_ACUITY_SCORE: 33
ADLS_ACUITY_SCORE: 34
ADLS_ACUITY_SCORE: 34
ADLS_ACUITY_SCORE: 33
ADLS_ACUITY_SCORE: 33
ADLS_ACUITY_SCORE: 32
ADLS_ACUITY_SCORE: 33
ADLS_ACUITY_SCORE: 34
ADLS_ACUITY_SCORE: 32
ADLS_ACUITY_SCORE: 33
ADLS_ACUITY_SCORE: 34
ADLS_ACUITY_SCORE: 34
ADLS_ACUITY_SCORE: 33
ADLS_ACUITY_SCORE: 32
ADLS_ACUITY_SCORE: 33
ADLS_ACUITY_SCORE: 34

## 2025-07-19 NOTE — PLAN OF CARE
Plan of Care Reviewed With: patient    Overall Patient Progress: improving    Alert and oriented x4. VSS. Afebrile. 4/10 pain to abdomen, oxycodone given x1 and scheduled tylenol given with effective relief. Patient has been awake most of the night. Midline dressing CDI, with scant old drainage. Bowel sounds hypoactive. Colostomy putting out small amount of stool. Patient empties colostomy himself. No nausea. ALISIA A with 40 mls out, B with 10 mls out. LR infusing per order. Voiding adequately. Up standby.     Face to face report given with opportunity to observe patient.    Report given to Km Mac RN   7/19/2025  7:10 AM

## 2025-07-19 NOTE — PROGRESS NOTES
[facility] Progress Note    S: Pain well controlled, emptied own stoma bag this am. No nausea or vomiting     # Pain Assessment:      2025     1:54 AM   Current Pain Score   Patient currently in pain? denies   Multimodal pain control available     O:   Vitals:  BP  Min: 129/96  Max: 157/91  Temp  Av.2  F (36.8  C)  Min: 97.9  F (36.6  C)  Max: 98.5  F (36.9  C)  Pulse  Av.2  Min: 65  Max: 78  I/O last 3 completed shifts:  In:  [I.V.:]  Out: 382 [Urine:3100; Drains:100; Stool:625]    Peripheral IV 25 Anterior;Left Lower forearm (Active)   Site Assessment [facility] 25   Line Status Infusing 25 020   Dressing Transparent 25   Dressing Status clean;dry;intact 25 020   Line Intervention Flushed 25 1500   Line Necessity Yes, meets criteria 25   Phlebitis Scale 0-->no symptoms 25   Infiltration? no 25   Number of days: 5       Drain Closed/Suction 1 Left LLQ Bulb 19 [place] (Active)   Site Description [facility] 25 020   Dressing Status Normal: Clean, Dry & Intact 25   Drainage Appearance Serosanguenous 25   Status To bulb suction 25 0200   Output (mL) 40 ml 25 0526   Number of days: 4       Drain Closed/Suction 2 Right RLQ Bulb 19 [place]ish (Active)   Site Description [facility] 25 020   Dressing Status Normal: Clean, Dry & Intact 25 020   Drainage Appearance Serous 25 020   Status To bulb suction 25 020   Output (mL) 10 ml 25 0526   Number of days: 4       Ostomy Colostomy LLQ End (Active)   Stoma Assessment Red 25   Stomal Appliance 2 piece 25 020   Output Consistency Soft 25   Output (ml) 50 ml 259   Number of days: 4       Incision/Surgical Site 07/15/25 Midline Abdomen (Active)   Incision Assessment UTV 25 0200   Dressing Foam 25   Lisa-Incision Assessment UT 25   Closure MONICA 25    Incision Drainage Amount Scant 07/19/25 0200   Drainage Description UTV 07/19/25 0200   Dressing Intervention Dressing marked - No change 07/19/25 0200   Number of days: 4     No line/device    Physical Exam:  General: alert oriented, no acute distress   ENT: sclera non-icteric   Pulmonary: no respiratory distress   CVS: RRR  ABD: bandage over midline with minimal shadowing, left drain with serosanguinous drainage, right drain with less serous drainage.   Extremities: WWP     Assessment/Plan:  POD # 4 from ex-lap and Chavo's procedure for presumed perforated diverticulitis.     Neuro: pain well controlled   CV: HDS ok to discharge tele   Pulm: on room air, encourage IS and ambulation   FEN/GI: return of bowel function will  increase diet to low fiber   Renal: UOP picking up, Cr stable,   Heme/ID: hbg stable, WBC stable at 12, on IV antibiotics per primary surgeon would like normal prior to discontinuing antibiotics, drain output not concerning.     Lovenox, H2 blocker.     Dispo likely home Monday     Leo Zacarias MD

## 2025-07-19 NOTE — PHARMACY-MEDICATION REGIMEN REVIEW
Pharmacy Antimicrobial Stewardship Assessment     Current Antimicrobial Therapy:  Anti-infectives (From now, onward)      Start     Dose/Rate Route Frequency Ordered Stop    25 1500  piperacillin-tazobactam (ZOSYN) 4.5 g vial to attach to  mL bag         4.5 g  over 30 Minutes Intravenous EVERY 6 HOURS 25 1341      25 1400  ciprofloxacin (CIPRO) infusion 400 mg         400 mg  over 1 Hours Intravenous EVERY 12 HOURS 25 1341      25 1400  metroNIDAZOLE (FLAGYL) infusion 500 mg            500 mg  over 60 Minutes Intravenous EVERY 12 HOURS 25 1350            Indication: Intra-Abdominal Infection    Days of Therapy: 7    Pertinent Labs:    Recent Labs   Lab Test 25  0521 25  0507 25  0446 25  0456 07/15/25  0528 25  0508 25  0505   WBC 12.0* 12.7* 17.0* 20.8* 19.9* 21.9* 20.1*       Recent Labs   Lab Test 25  0505   LACT 1.3   CRPI 6.22*     Temperature:  Temp (24hrs), Av.2  F (36.8  C), Min:97.9  F (36.6  C), Max:98.5  F (36.9  C)    Culture Results:   30-Day Micro Results       Collected Updated Procedure Result Status      2025 1244 2025 1321 COVID-19 Virus (Coronavirus) by PCR Nasopharyngeal [[id number]]    Swab from Nasopharyngeal    Final result Component Value   SARS CoV2 PCR Negative   NEGATIVE: SARS-CoV-2 (COVID-19) RNA not detected, presumed negative.            2025 0639 2025 1209 Blood Culture Peripheral blood (BC) Arm, Left [[id number]]   Peripheral blood (BC) from Arm, Left    Final result Component Value   Culture No Growth               2025 0639 2025 1209 Blood Culture Peripheral blood (BC) Hand, Right [[id number]]   Peripheral blood (BC) from Hand, Right    Final result Component Value   Culture No Growth                      Recommendations/Interventions:  1. None at this time. Agree with switching to PO antibiotics as tolerated by patient.     Nilsa Whatley [facility]  ,  2025

## 2025-07-19 NOTE — PLAN OF CARE
Plan of Care Reviewed With: Patient    Overall Patient Progress: Progressing    Patient A&Ox4. Afebrile. VSS. 2-4/10 pain to abd. PRN oxycodone given x2. Scheduled Tylenol given with relief. PIV SL. LS clear, on RA. HRR SR 60's-70's. Abd soft, tender, bs audible throughout. LLQ colostomy intact patient self emptied x1 into toilet, unable to measure. Left AILSIA drain 25 mL serosanguinous drainage. Right ALISIA drain 10 mLs serosanguinous drainage. Low Fiber diet, tolerating well. Voiding adequately. SBA. Up to chair most of the day, went for a walk in the hallway x2. Alarms on, call light within reach. Colostomy education done again with patient and family.    Face to face report given with opportunity to observe patient.    Report given to Milena MEJIA.    Km Norris RN   7/19/2025  7:10 PM

## 2025-07-19 NOTE — PROGRESS NOTES
"CLINICAL NUTRITION SERVICES  -  ASSESSMENT NOTE    REASON FOR ASSESSMENT:  LOS      NUTRITION HISTORY  Samy Lu is a 70 year old male admitted for diverticulitis of the colon with perforation. S/p Chavo's procedure, end colostomy. Limited past medical history. Noted weight loss of 8lbs over ~3 months prior to admission. Diet advanced this morning to low fiber diet.     Allergies   No Known Allergies    CURRENT NUTRITION ORDERS  Diet Order:   Orders Placed This Encounter      Low Fiber Diet  Current Intake/Tolerance: NPO/clears until this morning    ANTHROPOMETRICS  Height: 5' 9\"  Weight: 169 lbs 15.59 oz  Body mass index is 25.1 kg/m .  Weight Status:  Overweight BMI 25-29.9  Weight History: 8lb loss in 3 months  Wt Readings from Last 10 Encounters:   07/18/25 77.1 kg (169 lb 15.6 oz)   07/13/25 79.4 kg (175 lb 0.7 oz) first measured weight   04/17/25 83 kg (183 lb)   04/08/25 83 kg (182 lb 14.4 oz)   12/30/24 84.4 kg (186 lb)   06/06/24 84.8 kg (187 lb)   03/27/24 85.3 kg (188 lb)   02/07/24 84.8 kg (187 lb)   04/26/23 83.5 kg (184 lb)   04/11/23 82.1 kg (181 lb)   08/10/22 77.1 kg (170 lb)        LABS  Labs reviewed    MEDICATIONS  Medications reviewed    ASSESSED NUTRITION NEEDS:  Estimated Energy Needs: 0933-0871 kcals (25-30 Kcal/Kg)  Estimated Protein Needs: 75-95 grams protein (1-1.2 g pro/Kg)    Malnutrition Diagnosis: at risk with NPO/clear liquid status x 5 days this admission    NUTRITION INTERVENTIONS  Recommendations / Nutrition Prescription  Encourage intake during meal times  Consider Ensure/Ensure Clear depending on intake    MONITORING AND EVALUATION:  Intake, weight, labs        "

## 2025-07-20 LAB
ALBUMIN SERPL BCG-MCNC: 3.2 G/DL (ref 3.5–5.2)
ALP SERPL-CCNC: 73 U/L (ref 40–150)
ALT SERPL W P-5'-P-CCNC: 58 U/L (ref 0–70)
ANION GAP SERPL CALCULATED.3IONS-SCNC: 14 MMOL/L (ref 7–15)
AST SERPL W P-5'-P-CCNC: 67 U/L (ref 0–45)
BASOPHILS # BLD AUTO: 0.1 10E3/UL (ref 0–0.2)
BASOPHILS NFR BLD AUTO: 1 %
BILIRUB SERPL-MCNC: 1 MG/DL
BUN SERPL-MCNC: 10.7 MG/DL (ref 8–23)
CALCIUM SERPL-MCNC: 8.4 MG/DL (ref 8.8–10.4)
CHLORIDE SERPL-SCNC: 100 MMOL/L (ref 98–107)
CREAT SERPL-MCNC: 0.9 MG/DL (ref 0.67–1.17)
EGFRCR SERPLBLD CKD-EPI 2021: >90 ML/MIN/1.73M2
EOSINOPHIL # BLD AUTO: 0.4 10E3/UL (ref 0–0.7)
EOSINOPHIL NFR BLD AUTO: 3 %
ERYTHROCYTE [DISTWIDTH] IN BLOOD BY AUTOMATED COUNT: 12.5 % (ref 10–15)
GLUCOSE SERPL-MCNC: 109 MG/DL (ref 70–99)
HCO3 SERPL-SCNC: 21 MMOL/L (ref 22–29)
HCT VFR BLD AUTO: 47 % (ref 40–53)
HGB BLD-MCNC: 17.2 G/DL (ref 13.3–17.7)
IMM GRANULOCYTES # BLD: 0.3 10E3/UL
IMM GRANULOCYTES NFR BLD: 2 %
LYMPHOCYTES # BLD AUTO: 2.2 10E3/UL (ref 0.8–5.3)
LYMPHOCYTES NFR BLD AUTO: 16 %
MAGNESIUM SERPL-MCNC: 1.9 MG/DL (ref 1.7–2.3)
MCH RBC QN AUTO: 32.3 PG (ref 26.5–33)
MCHC RBC AUTO-ENTMCNC: 36.6 G/DL (ref 31.5–36.5)
MCV RBC AUTO: 88 FL (ref 78–100)
MONOCYTES # BLD AUTO: 1.7 10E3/UL (ref 0–1.3)
MONOCYTES NFR BLD AUTO: 12 %
NEUTROPHILS # BLD AUTO: 9.5 10E3/UL (ref 1.6–8.3)
NEUTROPHILS NFR BLD AUTO: 67 %
NRBC # BLD AUTO: 0 10E3/UL
NRBC BLD AUTO-RTO: 0 /100
PHOSPHATE SERPL-MCNC: 3.6 MG/DL (ref 2.5–4.5)
PLAT MORPH BLD: NORMAL
PLATELET # BLD AUTO: 337 10E3/UL (ref 150–450)
POTASSIUM SERPL-SCNC: 3.9 MMOL/L (ref 3.4–5.3)
PROT SERPL-MCNC: 6.1 G/DL (ref 6.4–8.3)
RBC # BLD AUTO: 5.33 10E6/UL (ref 4.4–5.9)
RBC MORPH BLD: NORMAL
SODIUM SERPL-SCNC: 135 MMOL/L (ref 135–145)
WBC # BLD AUTO: 14.2 10E3/UL (ref 4–11)

## 2025-07-20 PROCEDURE — 250N000011 HC RX IP 250 OP 636: Performed by: NURSE PRACTITIONER

## 2025-07-20 PROCEDURE — 83735 ASSAY OF MAGNESIUM: CPT | Performed by: SURGERY

## 2025-07-20 PROCEDURE — 120N000001 HC R&B MED SURG/OB

## 2025-07-20 PROCEDURE — 84100 ASSAY OF PHOSPHORUS: CPT | Performed by: SURGERY

## 2025-07-20 PROCEDURE — 250N000011 HC RX IP 250 OP 636: Performed by: SURGERY

## 2025-07-20 PROCEDURE — 250N000013 HC RX MED GY IP 250 OP 250 PS 637: Performed by: SURGERY

## 2025-07-20 PROCEDURE — 85025 COMPLETE CBC W/AUTO DIFF WBC: CPT | Performed by: SURGERY

## 2025-07-20 PROCEDURE — 36415 COLL VENOUS BLD VENIPUNCTURE: CPT | Performed by: SURGERY

## 2025-07-20 PROCEDURE — 82310 ASSAY OF CALCIUM: CPT | Performed by: SURGERY

## 2025-07-20 RX ORDER — MAGNESIUM OXIDE 400 MG/1
400 TABLET ORAL EVERY 4 HOURS
Status: COMPLETED | OUTPATIENT
Start: 2025-07-20 | End: 2025-07-20

## 2025-07-20 RX ORDER — METRONIDAZOLE 500 MG/1
500 TABLET ORAL 4 TIMES DAILY
Status: DISCONTINUED | OUTPATIENT
Start: 2025-07-20 | End: 2025-07-21 | Stop reason: HOSPADM

## 2025-07-20 RX ADMIN — METRONIDAZOLE 500 MG: 500 TABLET ORAL at 20:37

## 2025-07-20 RX ADMIN — ENOXAPARIN SODIUM 40 MG: 40 INJECTION SUBCUTANEOUS at 13:32

## 2025-07-20 RX ADMIN — METRONIDAZOLE 500 MG: 500 TABLET ORAL at 10:09

## 2025-07-20 RX ADMIN — FAMOTIDINE 20 MG: 20 TABLET, FILM COATED ORAL at 08:01

## 2025-07-20 RX ADMIN — CIPROFOLXACIN 750 MG: 250 TABLET ORAL at 10:09

## 2025-07-20 RX ADMIN — PIPERACILLIN AND TAZOBACTAM 4.5 G: 4; .5 INJECTION, POWDER, FOR SOLUTION INTRAVENOUS at 08:01

## 2025-07-20 RX ADMIN — FAMOTIDINE 20 MG: 20 TABLET, FILM COATED ORAL at 20:37

## 2025-07-20 RX ADMIN — METRONIDAZOLE 500 MG: 500 TABLET ORAL at 13:32

## 2025-07-20 RX ADMIN — ACETAMINOPHEN 975 MG: 325 TABLET ORAL at 08:00

## 2025-07-20 RX ADMIN — CIPROFLOXACIN 400 MG: 2 INJECTION, SOLUTION INTRAVENOUS at 01:23

## 2025-07-20 RX ADMIN — MAGNESIUM OXIDE TAB 400 MG (241.3 MG ELEMENTAL MG) 400 MG: 400 (241.3 MG) TAB at 06:42

## 2025-07-20 RX ADMIN — METRONIDAZOLE 500 MG: 500 TABLET ORAL at 16:31

## 2025-07-20 RX ADMIN — PIPERACILLIN AND TAZOBACTAM 4.5 G: 4; .5 INJECTION, POWDER, FOR SOLUTION INTRAVENOUS at 03:34

## 2025-07-20 RX ADMIN — MAGNESIUM OXIDE TAB 400 MG (241.3 MG ELEMENTAL MG) 400 MG: 400 (241.3 MG) TAB at 10:09

## 2025-07-20 RX ADMIN — OXYCODONE HYDROCHLORIDE 5 MG: 5 TABLET ORAL at 17:35

## 2025-07-20 RX ADMIN — METRONIDAZOLE 500 MG: 500 INJECTION, SOLUTION INTRAVENOUS at 02:34

## 2025-07-20 RX ADMIN — CIPROFOLXACIN 750 MG: 250 TABLET ORAL at 20:37

## 2025-07-20 RX ADMIN — ACETAMINOPHEN 975 MG: 325 TABLET ORAL at 16:31

## 2025-07-20 ASSESSMENT — ACTIVITIES OF DAILY LIVING (ADL)
ADLS_ACUITY_SCORE: 37
ADLS_ACUITY_SCORE: 37
ADLS_ACUITY_SCORE: 38
ADLS_ACUITY_SCORE: 37
ADLS_ACUITY_SCORE: 38
ADLS_ACUITY_SCORE: 37
ADLS_ACUITY_SCORE: 38
ADLS_ACUITY_SCORE: 38
ADLS_ACUITY_SCORE: 37
ADLS_ACUITY_SCORE: 37
ADLS_ACUITY_SCORE: 38
ADLS_ACUITY_SCORE: 37
ADLS_ACUITY_SCORE: 38
ADLS_ACUITY_SCORE: 37
ADLS_ACUITY_SCORE: 38
ADLS_ACUITY_SCORE: 38

## 2025-07-20 NOTE — PLAN OF CARE
Plan of Care Reviewed With: patient    Overall Patient Progress: improving    Alert and oriented x4. VSS. Afebrile. 4/10 pain to abdomen, oxycodone given x2 and scheduled tylenol given with effective relief. Midline dressing CDI, with scant old drainage. Bowel sounds active. Colostomy putting out small amount of stool. Patient empties colostomy himself. No nausea. ALISIA drain A with 20 mls out. ALISIA B with unmeasureable amount out. Voiding adequately. Up standby. Ambulated the hallway.    Face to face report given with opportunity to observe patient.    Report given to Km Mac RN   7/20/2025  7:10 AM

## 2025-07-20 NOTE — PHARMACY
Pharmacy Antimicrobial Stewardship Assessment     Current Antimicrobial Therapy:  Anti-infectives (From now, onward)      Start     Dose/Rate Route Frequency Ordered Stop    25 1000  ciprofloxacin (CIPRO) tablet 750 mg         750 mg Oral EVERY 12 HOURS SCHEDULED 25 0940      25 1000  metroNIDAZOLE (FLAGYL) tablet 500 mg         500 mg Oral 4 TIMES DAILY 25 0940              Indication: Intra-Abdominal Infection    Days of Therapy: 8     Pertinent Labs:    Recent Labs   Lab Test 25  0518 25  0521 25  0507 25  0446 25  0456 07/15/25  0528 25  0508   WBC 14.2* 12.0* 12.7* 17.0* 20.8* 19.9* 21.9*       Recent Labs   Lab Test 25  0505   LACT 1.3   CRPI 6.22*        Temperature:  Temp (24hrs), Av.3  F (36.8  C), Min:98.1  F (36.7  C), Max:98.5  F (36.9  C)      Culture Results:   30-Day Micro Results       Collected Updated Procedure Result Status      2025 1244 2025 1321 COVID-19 Virus (Coronavirus) by PCR Nasopharyngeal [[id number]]    Swab from Nasopharyngeal    Final result Component Value   SARS CoV2 PCR Negative   NEGATIVE: SARS-CoV-2 (COVID-19) RNA not detected, presumed negative.            2025 0639 2025 1209 Blood Culture Peripheral blood (BC) Arm, Left [[id number]]   Peripheral blood (BC) from Arm, Left    Final result Component Value   Culture No Growth               2025 0639 2025 1209 Blood Culture Peripheral blood (BC) Hand, Right [[id number]]   Peripheral blood (BC) from Hand, Right    Final result Component Value   Culture No Growth                        Recent Antibiotics:      Recommendations/Interventions:  1. WBC increased today, continue to monitor with switch to PO antibiotics today. No recommendations at this time.     Magdalena Damon RPH  2025

## 2025-07-20 NOTE — PROGRESS NOTES
INPATIENT ROUNDING NOTE  7/20/2025    Patient: Samy Lu    Physician of Record: aTiwo Briceno MD    Admitting diagnosis: Diverticulitis of colon with perforation [K57.20]  Perforated bowel (H) [K63.1]    Procedure(s):  LAPAROTOMY, Takedown Splenic Flexure, Hartmanns Procedure with Creation End Ostomy  COLECTOMY, SIGMOID, OPEN     POD: 5 Days Post-Op    Current Diet: Regular    CURRENT MEDICATIONS:  Continuous Medications:  Current Facility-Administered Medications   Medication Dose Route Frequency Provider Last Rate Last Admin    acetaminophen (TYLENOL) tablet 975 mg  975 mg Oral or NG Tube Q8H Taiwo Briceno MD   975 mg at 07/20/25 0800    ciprofloxacin (CIPRO) infusion 400 mg  400 mg Intravenous Q12H Taiwo Briceno MD   400 mg at 07/20/25 0123    enoxaparin ANTICOAGULANT (LOVENOX) injection 40 mg  40 mg Subcutaneous Q24H Lo Bangura NP   40 mg at 07/19/25 1244    famotidine (PEPCID) tablet 20 mg  20 mg Oral or NG Tube BID Taiwo Briceno MD   20 mg at 07/20/25 0801    Or    famotidine (PEPCID) injection 20 mg  20 mg Intravenous BID Taiwo Briceno MD   20 mg at 07/17/25 2137    HYDROmorphone (DILAUDID) injection 0.2 mg  0.2 mg Intravenous Q2H PRN Leo Zacarias MD        Or    HYDROmorphone (PF) (DILAUDID) injection 0.5 mg  0.5 mg Intravenous Q2H PRN Leo Zacarias MD        lidocaine (LMX4) cream   Topical Q1H PRN Taiwo Briceno MD        lidocaine 1 % 0.1-1 mL  0.1-1 mL Other Q1H PRN Taiwo Briceno MD        magnesium oxide (MAG-OX) tablet 400 mg  400 mg Oral Q4H Taiwo Briceno MD   400 mg at 07/20/25 0642    metroNIDAZOLE (FLAGYL) infusion 500 mg  500 mg Intravenous Q12H Taiwo Briceno MD   500 mg at 07/20/25 0234    naloxone (NARCAN) injection 0.1 mg  0.1 mg Intravenous Q2 Min PRN Pettinari, Nhung, APRN CRNA        ondansetron (ZOFRAN ODT) ODT tab 4 mg  4 mg Oral Q6H PRN Taiwo Briceno MD        Or    ondansetron  (ZOFRAN) injection 4 mg  4 mg Intravenous Q6H PRN Taiwo Briceno MD        oxyCODONE (ROXICODONE) tablet 5 mg  5 mg Oral Q4H PRN Taiwo Briceno MD   5 mg at 07/19/25 2338    piperacillin-tazobactam (ZOSYN) 4.5 g vial to attach to  mL bag  4.5 g Intravenous Q6H Taiwo Briceno MD   4.5 g at 07/20/25 0801    prochlorperazine (COMPAZINE) injection 5 mg  5 mg Intravenous Q6H PRN Taiwo Briceno MD        Or    prochlorperazine (COMPAZINE) tablet 5 mg  5 mg Oral or NG Tube Q6H PRN Taiwo Briceno MD        sodium chloride (PF) 0.9% PF flush 3 mL  3 mL Intracatheter Q8H CASIMIRO Taiwo Briceno MD   3 mL at 07/19/25 2046    sodium chloride (PF) 0.9% PF flush 3 mL  3 mL Intracatheter q1 min prn Taiwo Briceno MD   3 mL at 07/17/25 0146       Scheduled Medications:  Current Facility-Administered Medications   Medication Dose Route Frequency Provider Last Rate Last Admin    acetaminophen (TYLENOL) tablet 975 mg  975 mg Oral or NG Tube Q8H Taiwo Briceno MD   975 mg at 07/20/25 0800    ciprofloxacin (CIPRO) infusion 400 mg  400 mg Intravenous Q12H Taiwo Briceno MD   400 mg at 07/20/25 0123    enoxaparin ANTICOAGULANT (LOVENOX) injection 40 mg  40 mg Subcutaneous Q24H Lo Bangura NP   40 mg at 07/19/25 1244    famotidine (PEPCID) tablet 20 mg  20 mg Oral or NG Tube BID Taiwo Briceno MD   20 mg at 07/20/25 0801    Or    famotidine (PEPCID) injection 20 mg  20 mg Intravenous BID Taiwo Briceno MD   20 mg at 07/17/25 2137    HYDROmorphone (DILAUDID) injection 0.2 mg  0.2 mg Intravenous Q2H PRN Leo Zacarias MD        Or    HYDROmorphone (PF) (DILAUDID) injection 0.5 mg  0.5 mg Intravenous Q2H PRN Leo Zacarias MD        lidocaine (LMX4) cream   Topical Q1H PRN Taiwo Briceno MD        lidocaine 1 % 0.1-1 mL  0.1-1 mL Other Q1H PRN Taiwo Briceno MD        magnesium oxide (MAG-OX) tablet 400 mg  400 mg Oral Q4H  Taiwo Briceno MD   400 mg at 07/20/25 0642    metroNIDAZOLE (FLAGYL) infusion 500 mg  500 mg Intravenous Q12H Taiwo Briceno MD   500 mg at 07/20/25 0234    naloxone (NARCAN) injection 0.1 mg  0.1 mg Intravenous Q2 Min PRN Nhung Rivera APRN CRNA        ondansetron (ZOFRAN ODT) ODT tab 4 mg  4 mg Oral Q6H PRN Taiwo Briceno MD        Or    ondansetron (ZOFRAN) injection 4 mg  4 mg Intravenous Q6H PRN Taiwo Briceno MD        oxyCODONE (ROXICODONE) tablet 5 mg  5 mg Oral Q4H PRN Taiwo Briceno MD   5 mg at 07/19/25 2338    piperacillin-tazobactam (ZOSYN) 4.5 g vial to attach to  mL bag  4.5 g Intravenous Q6H Taiwo Briceno MD   4.5 g at 07/20/25 0801    prochlorperazine (COMPAZINE) injection 5 mg  5 mg Intravenous Q6H PRN Taiwo Briceno MD        Or    prochlorperazine (COMPAZINE) tablet 5 mg  5 mg Oral or NG Tube Q6H PRN Taiwo Briceno MD        sodium chloride (PF) 0.9% PF flush 3 mL  3 mL Intracatheter Q8H CASIMIRO Taiwo Briceno MD   3 mL at 07/19/25 2046    sodium chloride (PF) 0.9% PF flush 3 mL  3 mL Intracatheter q1 min prn Taiwo Briceno MD   3 mL at 07/17/25 0146       PRN Medications:  Current Facility-Administered Medications   Medication Dose Route Frequency Provider Last Rate Last Admin    acetaminophen (TYLENOL) tablet 975 mg  975 mg Oral or NG Tube Q8H Taiwo Briceno MD   975 mg at 07/20/25 0800    ciprofloxacin (CIPRO) infusion 400 mg  400 mg Intravenous Q12H Taiwo Briceno MD   400 mg at 07/20/25 0123    enoxaparin ANTICOAGULANT (LOVENOX) injection 40 mg  40 mg Subcutaneous Q24H Lo Bangura NP   40 mg at 07/19/25 1244    famotidine (PEPCID) tablet 20 mg  20 mg Oral or NG Tube BID Taiwo Briceno MD   20 mg at 07/20/25 0801    Or    famotidine (PEPCID) injection 20 mg  20 mg Intravenous BID Taiwo Briceno MD   20 mg at 07/17/25 2137    HYDROmorphone (DILAUDID) injection 0.2 mg  " 0.2 mg Intravenous Q2H PRN Leo Zacarias MD        Or    HYDROmorphone (PF) (DILAUDID) injection 0.5 mg  0.5 mg Intravenous Q2H PRN Leo Zacarias MD        lidocaine (LMX4) cream   Topical Q1H PRN Taiwo Briceno MD        lidocaine 1 % 0.1-1 mL  0.1-1 mL Other Q1H PRN Taiwo Briceno MD        magnesium oxide (MAG-OX) tablet 400 mg  400 mg Oral Q4H Taiwo Briceno MD   400 mg at 07/20/25 0642    metroNIDAZOLE (FLAGYL) infusion 500 mg  500 mg Intravenous Q12H Taiwo Briceno MD   500 mg at 07/20/25 0234    naloxone (NARCAN) injection 0.1 mg  0.1 mg Intravenous Q2 Min PRN Nhung Rivera APRN CRNA        ondansetron (ZOFRAN ODT) ODT tab 4 mg  4 mg Oral Q6H PRN Taiwo Briceno MD        Or    ondansetron (ZOFRAN) injection 4 mg  4 mg Intravenous Q6H PRN Taiwo Briceno MD        oxyCODONE (ROXICODONE) tablet 5 mg  5 mg Oral Q4H PRN Taiwo Briceno MD   5 mg at 07/19/25 2338    piperacillin-tazobactam (ZOSYN) 4.5 g vial to attach to  mL bag  4.5 g Intravenous Q6H Taiwo Briceno MD   4.5 g at 07/20/25 0801    prochlorperazine (COMPAZINE) injection 5 mg  5 mg Intravenous Q6H PRN Taiwo Briceno MD        Or    prochlorperazine (COMPAZINE) tablet 5 mg  5 mg Oral or NG Tube Q6H PRN Taiwo Briceno MD        sodium chloride (PF) 0.9% PF flush 3 mL  3 mL Intracatheter Q8H CASIMIRO Taiwo Briceno MD   3 mL at 07/19/25 2046    sodium chloride (PF) 0.9% PF flush 3 mL  3 mL Intracatheter q1 min prn Taiwo Briceno MD   3 mL at 07/17/25 0146       SUBJECTIVE:   Nausea: No. Vomiting: No. Fever: No. Chills: No. Excessive burping: No. Flatus: Yes. BM: Yes. Pain is 3/10. Pain control: good. Tolerating current diet: Yes.      PHYSICAL EXAM:   Vital signs: /71 (BP Location: Left arm, Cuff Size: Adult Regular)   Pulse 86   Temp 98.2  F (36.8  C) (Tympanic)   Resp 18   Ht 1.753 m (5' 9\")   Wt 77.1 kg (169 lb 15.6 oz)   SpO2 94%   " BMI 25.10 kg/m     Weight: [unfilled]   BMI: Body mass index is 25.1 kg/m .   General: Normal, healthy, cooperative, in no acute distress, alert   HEENT: PERRLA and EOMI   Neck: supple   Lungs: clear to auscultation   CV: Regular rate and rhythm without murmer   Abdominal: Abdomen soft, non-tender. BS normal. No masses, organomegaly   Wound: Closed wound. Clean, dry and intact. Healing well.  Ostomy pink.  Stool in bag.   Extremities: No cyanosis, clubbing or edema noted bilaterally in Upper and Lower Extremities   Neurological: without deficit    INPUT/OUTPUT:      Intake/Output Summary (Last 24 hours) at 7/20/2025 0934  Last data filed at 7/20/2025 0800  Gross per 24 hour   Intake 2880 ml   Output 1105 ml   Net 1775 ml       I/O last 3 completed shifts:  In: 2400 [P.O.:1200; I.V.:1200]  Out: 1105 [Urine:1050; Drains:55]    LABS:    Last CBC Rrsults:   Recent Labs   Lab Test 07/20/25 0518 07/19/25 0521 07/18/25  0507   WBC 14.2* 12.0* 12.7*   RBC 5.33 5.34 5.08   HGB 17.2 17.2 16.6   HCT 47.0 47.2 45.0   MCV 88 88 89   MCH 32.3 32.2 32.7   MCHC 36.6* 36.4 36.9*   RDW 12.5 12.4 12.5    281 252       Last Comprehensive Metabolic panel:  Recent Labs   Lab Test 07/20/25 0518 07/19/25 0521 07/18/25  0507    132* 132*   POTASSIUM 3.9 3.6 3.5   CHLORIDE 100 98 96*   CO2 21* 21* 21*   ANIONGAP 14 13 15   * 109* 94   BUN 10.7 8.5 11.5   CR 0.90 0.86 0.85   GFRESTIMATED >90 >90 >90   NURIS 8.4* 8.3* 8.1*   BILITOTAL 1.0 0.9 0.9   ALKPHOS 73 53 57   ALT 58 37 33   AST 67* 32 31       Recent Labs   Lab Test 07/20/25 0518 07/19/25 0521 07/18/25  0507   MAG 1.9 1.7 1.6*   ALBUMIN 3.2* 3.1* 3.0*   PHOS 3.6 3.7 3.0     ASSESSMENT:    5 Days Post-Op from Procedure(s):  LAPAROTOMY, Takedown Splenic Flexure, Hartmanns Procedure with Creation End Ostomy  COLECTOMY, SIGMOID, OPEN.     Overall doing well.  Bowel function good.  White count did bump a little.    PLAN: Will go ahead and change to oral antibiotics and  see how he does over the next 24 hours.  If his white count continues to rise we will need to get a CT of the abdomen pelvis.  Patient may shower.

## 2025-07-20 NOTE — PLAN OF CARE
Plan of Care Reviewed With: Patient    Overall Patient Progress: Progressing    Patient A&Ox4. Afebrile. VSS. 2-4/10 pain to abd. Scheduled Tylenol given with relief. PRN Oxycodone x1. PIV SL. LS clear, on RA. HR 80's. Abd soft, tender, bs audible throughout. LLQ colostomy intact. Left ALISIA drain 20 mL serosanguinous drainage. Right ALISIA drain 0 mLs serosanguinous drainage. Low Fiber diet, tolerating well. Voiding adequately. SBA. Up to chair most of the day, went for multiple walks in the hallway. Alarms on, call light within reach.      Face to face report given with opportunity to observe patient.    Report given to Milena Norris RN   7/20/2025  7:00 PM

## 2025-07-21 ENCOUNTER — TELEPHONE (OUTPATIENT)
Dept: FAMILY MEDICINE | Facility: OTHER | Age: 70
End: 2025-07-21

## 2025-07-21 VITALS
RESPIRATION RATE: 16 BRPM | WEIGHT: 169.97 LBS | HEIGHT: 69 IN | HEART RATE: 70 BPM | OXYGEN SATURATION: 99 % | BODY MASS INDEX: 25.18 KG/M2 | DIASTOLIC BLOOD PRESSURE: 89 MMHG | SYSTOLIC BLOOD PRESSURE: 133 MMHG | TEMPERATURE: 97.6 F

## 2025-07-21 LAB
ALBUMIN SERPL BCG-MCNC: 3.2 G/DL (ref 3.5–5.2)
ALP SERPL-CCNC: 61 U/L (ref 40–150)
ALT SERPL W P-5'-P-CCNC: 58 U/L (ref 0–70)
ANION GAP SERPL CALCULATED.3IONS-SCNC: 12 MMOL/L (ref 7–15)
AST SERPL W P-5'-P-CCNC: 41 U/L (ref 0–45)
BASOPHILS # BLD AUTO: 0.1 10E3/UL (ref 0–0.2)
BASOPHILS NFR BLD AUTO: 1 %
BILIRUB SERPL-MCNC: 0.6 MG/DL
BUN SERPL-MCNC: 13.1 MG/DL (ref 8–23)
CALCIUM SERPL-MCNC: 8.3 MG/DL (ref 8.8–10.4)
CHLORIDE SERPL-SCNC: 97 MMOL/L (ref 98–107)
CREAT SERPL-MCNC: 0.92 MG/DL (ref 0.67–1.17)
EGFRCR SERPLBLD CKD-EPI 2021: 89 ML/MIN/1.73M2
EOSINOPHIL # BLD AUTO: 0.3 10E3/UL (ref 0–0.7)
EOSINOPHIL NFR BLD AUTO: 2 %
ERYTHROCYTE [DISTWIDTH] IN BLOOD BY AUTOMATED COUNT: 12.7 % (ref 10–15)
GLUCOSE SERPL-MCNC: 118 MG/DL (ref 70–99)
HCO3 SERPL-SCNC: 23 MMOL/L (ref 22–29)
HCT VFR BLD AUTO: 46.3 % (ref 40–53)
HGB BLD-MCNC: 16.7 G/DL (ref 13.3–17.7)
IMM GRANULOCYTES # BLD: 0.5 10E3/UL
IMM GRANULOCYTES NFR BLD: 4 %
LYMPHOCYTES # BLD AUTO: 1.6 10E3/UL (ref 0.8–5.3)
LYMPHOCYTES NFR BLD AUTO: 12 %
MAGNESIUM SERPL-MCNC: 2 MG/DL (ref 1.7–2.3)
MCH RBC QN AUTO: 32.4 PG (ref 26.5–33)
MCHC RBC AUTO-ENTMCNC: 36.1 G/DL (ref 31.5–36.5)
MCV RBC AUTO: 90 FL (ref 78–100)
MONOCYTES # BLD AUTO: 1.5 10E3/UL (ref 0–1.3)
MONOCYTES NFR BLD AUTO: 11 %
NEUTROPHILS # BLD AUTO: 9.9 10E3/UL (ref 1.6–8.3)
NEUTROPHILS NFR BLD AUTO: 71 %
NRBC # BLD AUTO: 0 10E3/UL
NRBC BLD AUTO-RTO: 0 /100
PATH REPORT.COMMENTS IMP SPEC: NORMAL
PATH REPORT.FINAL DX SPEC: NORMAL
PATH REPORT.GROSS SPEC: NORMAL
PATH REPORT.MICROSCOPIC SPEC OTHER STN: NORMAL
PATH REPORT.RELEVANT HX SPEC: NORMAL
PHOSPHATE SERPL-MCNC: 3.1 MG/DL (ref 2.5–4.5)
PHOTO IMAGE: NORMAL
PLATELET # BLD AUTO: 329 10E3/UL (ref 150–450)
POTASSIUM SERPL-SCNC: 4 MMOL/L (ref 3.4–5.3)
PROT SERPL-MCNC: 6 G/DL (ref 6.4–8.3)
RBC # BLD AUTO: 5.16 10E6/UL (ref 4.4–5.9)
SODIUM SERPL-SCNC: 132 MMOL/L (ref 135–145)
WBC # BLD AUTO: 14 10E3/UL (ref 4–11)

## 2025-07-21 PROCEDURE — 36415 COLL VENOUS BLD VENIPUNCTURE: CPT | Performed by: SURGERY

## 2025-07-21 PROCEDURE — 250N000013 HC RX MED GY IP 250 OP 250 PS 637: Performed by: SURGERY

## 2025-07-21 PROCEDURE — 80053 COMPREHEN METABOLIC PANEL: CPT | Performed by: SURGERY

## 2025-07-21 PROCEDURE — 83735 ASSAY OF MAGNESIUM: CPT | Performed by: SURGERY

## 2025-07-21 PROCEDURE — 84100 ASSAY OF PHOSPHORUS: CPT | Performed by: SURGERY

## 2025-07-21 PROCEDURE — 85025 COMPLETE CBC W/AUTO DIFF WBC: CPT | Performed by: SURGERY

## 2025-07-21 RX ORDER — METRONIDAZOLE 500 MG/1
500 TABLET ORAL 4 TIMES DAILY
Qty: 20 TABLET | Refills: 0 | Status: SHIPPED | OUTPATIENT
Start: 2025-07-21 | End: 2025-07-28

## 2025-07-21 RX ORDER — OXYCODONE HYDROCHLORIDE 5 MG/1
5 TABLET ORAL EVERY 6 HOURS PRN
Qty: 12 TABLET | Refills: 0 | Status: SHIPPED | OUTPATIENT
Start: 2025-07-21 | End: 2025-07-28

## 2025-07-21 RX ORDER — CIPROFLOXACIN 750 MG/1
750 TABLET, FILM COATED ORAL 2 TIMES DAILY
Qty: 10 TABLET | Refills: 0 | Status: SHIPPED | OUTPATIENT
Start: 2025-07-21 | End: 2025-07-28

## 2025-07-21 RX ADMIN — ACETAMINOPHEN 975 MG: 325 TABLET ORAL at 00:49

## 2025-07-21 RX ADMIN — CIPROFOLXACIN 750 MG: 250 TABLET ORAL at 09:11

## 2025-07-21 RX ADMIN — FAMOTIDINE 20 MG: 20 TABLET, FILM COATED ORAL at 09:11

## 2025-07-21 RX ADMIN — ACETAMINOPHEN 975 MG: 325 TABLET ORAL at 09:11

## 2025-07-21 RX ADMIN — METRONIDAZOLE 500 MG: 500 TABLET ORAL at 09:11

## 2025-07-21 ASSESSMENT — ACTIVITIES OF DAILY LIVING (ADL)
ADLS_ACUITY_SCORE: 37
ADLS_ACUITY_SCORE: 38
ADLS_ACUITY_SCORE: 37
ADLS_ACUITY_SCORE: 37
ADLS_ACUITY_SCORE: 38
ADLS_ACUITY_SCORE: 37
ADLS_ACUITY_SCORE: 38
ADLS_ACUITY_SCORE: 37
ADLS_ACUITY_SCORE: 38
ADLS_ACUITY_SCORE: 38

## 2025-07-21 NOTE — PLAN OF CARE
Plan of Care Reviewed With: patient    Overall Patient Progress: improving    Alert and oriented x4. VSS. Afebrile. Minimal pain to abdomen, scheduled tylenol given with effective relief. IV SL. Midline incision MARTINE. Bowel sounds active. No nausea. ALISIA drains with 5 mls out each. Voiding without difficulty. Colostomy with small amount of output, patient empties himself so did not measure. Up independently.     Face to face report given with opportunity to observe patient.    Report given to Caryl Mac RN   7/21/2025  7:14 AM

## 2025-07-21 NOTE — PROGRESS NOTES
INPATIENT ROUNDING NOTE  7/21/2025    Patient: Samy Lu    Physician of Record: Taiwo Briceno MD    Admitting diagnosis: Diverticulitis of colon with perforation [K57.20]  Perforated bowel (H) [K63.1]    Procedure(s):  LAPAROTOMY, Takedown Splenic Flexure, Hartmanns Procedure with Creation End Ostomy  COLECTOMY, SIGMOID, OPEN     POD: 6 Days Post-Op    Current Diet: Regular    CURRENT MEDICATIONS:  Continuous Medications:  Current Facility-Administered Medications   Medication Dose Route Frequency Provider Last Rate Last Admin    acetaminophen (TYLENOL) tablet 975 mg  975 mg Oral or NG Tube Q8H Taiwo Briceno MD   975 mg at 07/21/25 0911    ciprofloxacin (CIPRO) tablet 750 mg  750 mg Oral Q12H [facility] (08/20) Taiwo Briceno MD   750 mg at 07/21/25 0911    enoxaparin ANTICOAGULANT (LOVENOX) injection 40 mg  40 mg Subcutaneous Q24H Lo Bangura NP   40 mg at 07/20/25 1332    famotidine (PEPCID) tablet 20 mg  20 mg Oral or NG Tube BID Taiwo Briceno MD   20 mg at 07/21/25 0911    Or    famotidine (PEPCID) injection 20 mg  20 mg Intravenous BID Taiwo Briceno MD   20 mg at 07/17/25 2137    HYDROmorphone (DILAUDID) injection 0.2 mg  0.2 mg Intravenous Q2H PRN Leo Zacarias MD        Or    HYDROmorphone (PF) (DILAUDID) injection 0.5 mg  0.5 mg Intravenous Q2H PRN Leo Zacarias MD        lidocaine (LMX4) cream   Topical Q1H PRN Taiwo Briceno MD        lidocaine 1 % 0.1-1 mL  0.1-1 mL Other Q1H PRN Taiwo Briceno MD        metroNIDAZOLE (FLAGYL) tablet 500 mg  500 mg Oral 4x Daily Taiwo Briceno MD   500 mg at 07/21/25 0911    naloxone (NARCAN) injection 0.1 mg  0.1 mg Intravenous Q2 Min PRN PetbhavikriNhung, APRN CRNA        ondansetron (ZOFRAN ODT) ODT tab 4 mg  4 mg Oral Q6H PRN Taiwo Briceno MD        Or    ondansetron (ZOFRAN) injection 4 mg  4 mg Intravenous Q6H PRN Taiwo Briceno MD        oxyCODONE (ROXICODONE) tablet 5 mg   5 mg Oral Q4H PRN Taiwo Briceno MD   5 mg at 07/20/25 1735    prochlorperazine (COMPAZINE) injection 5 mg  5 mg Intravenous Q6H PRN Taiwo Briceno MD        Or    prochlorperazine (COMPAZINE) tablet 5 mg  5 mg Oral or NG Tube Q6H PRN Taiwo Briceno MD        sodium chloride (PF) 0.9% PF flush 3 mL  3 mL Intracatheter Q8H [facility] Taiwo Briceno MD   3 mL at 07/20/25 2038    sodium chloride (PF) 0.9% PF flush 3 mL  3 mL Intracatheter q1 min prn Taiwo Briceno MD   3 mL at 07/17/25 0146       Scheduled Medications:  Current Facility-Administered Medications   Medication Dose Route Frequency Provider Last Rate Last Admin    acetaminophen (TYLENOL) tablet 975 mg  975 mg Oral or NG Tube Q8H Taiwo Briceno MD   975 mg at 07/21/25 0911    ciprofloxacin (CIPRO) tablet 750 mg  750 mg Oral Q12H [facility] (08/20) Taiwo Briceno MD   750 mg at 07/21/25 0911    enoxaparin ANTICOAGULANT (LOVENOX) injection 40 mg  40 mg Subcutaneous Q24H Lo Bangura NP   40 mg at 07/20/25 1332    famotidine (PEPCID) tablet 20 mg  20 mg Oral or NG Tube BID Taiwo Briceno MD   20 mg at 07/21/25 0911    Or    famotidine (PEPCID) injection 20 mg  20 mg Intravenous BID Taiwo Briceno MD   20 mg at 07/17/25 2137    HYDROmorphone (DILAUDID) injection 0.2 mg  0.2 mg Intravenous Q2H PRN Leo Zacarias MD        Or    HYDROmorphone (PF) (DILAUDID) injection 0.5 mg  0.5 mg Intravenous Q2H PRN Leo Zacarias MD        lidocaine (LMX4) cream   Topical Q1H PRN Taiwo Briceno MD        lidocaine 1 % 0.1-1 mL  0.1-1 mL Other Q1H PRN Taiwo Briceno MD        metroNIDAZOLE (FLAGYL) tablet 500 mg  500 mg Oral 4x Daily Taiwo Briceno MD   500 mg at 07/21/25 0911    naloxone (NARCAN) injection 0.1 mg  0.1 mg Intravenous Q2 Min PRN Nhung Rivera APRN CRNA        ondansetron (ZOFRAN ODT) ODT tab 4 mg  4 mg Oral Q6H PRN Taiwo Briceno MD        Or     ondansetron (ZOFRAN) injection 4 mg  4 mg Intravenous Q6H PRN Taiwo Briceno MD        oxyCODONE (ROXICODONE) tablet 5 mg  5 mg Oral Q4H PRN Taiwo Briceno MD   5 mg at 07/20/25 1735    prochlorperazine (COMPAZINE) injection 5 mg  5 mg Intravenous Q6H PRN Taiwo Briceno MD        Or    prochlorperazine (COMPAZINE) tablet 5 mg  5 mg Oral or NG Tube Q6H PRN Taiwo Briceno MD        sodium chloride (PF) 0.9% PF flush 3 mL  3 mL Intracatheter Q8H [facility] Taiwo Briceno MD   3 mL at 07/20/25 2038    sodium chloride (PF) 0.9% PF flush 3 mL  3 mL Intracatheter q1 min prn Taiwo Briceno MD   3 mL at 07/17/25 0146       PRN Medications:  Current Facility-Administered Medications   Medication Dose Route Frequency Provider Last Rate Last Admin    acetaminophen (TYLENOL) tablet 975 mg  975 mg Oral or NG Tube Q8H Taiwo Briceno MD   975 mg at 07/21/25 0911    ciprofloxacin (CIPRO) tablet 750 mg  750 mg Oral Q12H [facility] (08/20) Taiwo Briceno MD   750 mg at 07/21/25 0911    enoxaparin ANTICOAGULANT (LOVENOX) injection 40 mg  40 mg Subcutaneous Q24H Mary Annkentonjose Lovenkat BRAN NP   40 mg at 07/20/25 1332    famotidine (PEPCID) tablet 20 mg  20 mg Oral or NG Tube BID Taiwo Briceno MD   20 mg at 07/21/25 0911    Or    famotidine (PEPCID) injection 20 mg  20 mg Intravenous BID Taiwo Briceno MD   20 mg at 07/17/25 2137    HYDROmorphone (DILAUDID) injection 0.2 mg  0.2 mg Intravenous Q2H PRN Leo Zacarias MD        Or    HYDROmorphone (PF) (DILAUDID) injection 0.5 mg  0.5 mg Intravenous Q2H PRN Leo Zacarias MD        lidocaine (LMX4) cream   Topical Q1H PRN Taiwo Briceno MD        lidocaine 1 % 0.1-1 mL  0.1-1 mL Other Q1H PRN Taiwo Briceno MD        metroNIDAZOLE (FLAGYL) tablet 500 mg  500 mg Oral 4x Daily Taiwo Briceno MD   500 mg at 07/21/25 0911    naloxone (NARCAN) injection 0.1 mg  0.1 mg Intravenous Q2 Min PRN Nhung Rivera,  "APRN CRNA        ondansetron (ZOFRAN ODT) ODT tab 4 mg  4 mg Oral Q6H PRTaiwo Suarez MD        Or    ondansetron (ZOFRAN) injection 4 mg  4 mg Intravenous Q6H PRTaiwo Suarez MD        oxyCODONE (ROXICODONE) tablet 5 mg  5 mg Oral Q4H PRTaiwo Suarez MD   5 mg at 07/20/25 1735    prochlorperazine (COMPAZINE) injection 5 mg  5 mg Intravenous Q6H PRTaiwo Suarez MD        Or    prochlorperazine (COMPAZINE) tablet 5 mg  5 mg Oral or NG Tube Q6H PRTaiwo Suarez MD        sodium chloride (PF) 0.9% PF flush 3 mL  3 mL Intracatheter Q8H [facility] Taiwo Briceno MD   3 mL at 07/20/25 2038    sodium chloride (PF) 0.9% PF flush 3 mL  3 mL Intracatheter q1 min prTaiwo Suarez MD   3 mL at 07/17/25 0146       SUBJECTIVE:   Nausea: No. Vomiting: No. Fever: No. Chills: No. Excessive burping: No. Flatus (in bag): Yes. BM: Yes (in bag). Pain is 2/10. Pain control: good. Tolerating current diet: Yes.  Overall doing well.  No complaints today.  Would like to go home.    PHYSICAL EXAM:   Vital signs: /89 (BP Location: Left arm)   Pulse 70   Temp 97.6  F (36.4  C) (Tympanic)   Resp 16   Ht 1.753 m (5' 9\")   Wt 77.1 kg (169 lb 15.6 oz)   SpO2 99%   BMI 25.10 kg/m     Weight: [unfilled]   BMI: Body mass index is 25.1 kg/m .   General: Normal, healthy, cooperative, in no acute distress, alert   HEENT: PERRLA and EOMI   Neck: supple   Lungs: clear to auscultation   CV: Regular rate and rhythm without murmer   Abdominal: Abdomen soft, non-tender. BS normal. No masses, organomegaly   Wound: Closed wound. Clean, dry and intact. Healing well.  Ostomy pink and viable.  Stool in urine bag.   Extremities: No cyanosis, clubbing or edema noted bilaterally in Upper and Lower Extremities   Neurological: without deficit    INPUT/OUTPUT:      Intake/Output Summary (Last 24 hours) at 7/21/2025 1158  Last data filed at 7/21/2025 0500  Gross per 24 hour   Intake 480 ml   Output " 30 ml   Net 450 ml       I/O last 3 completed shifts:  In: 960 [P.O.:960]  Out: 30 [Drains:30]    LABS:    Last CBC Rrsults:   Recent Labs   Lab Test 07/21/25 0636 07/20/25 0518 07/19/25 0521   WBC 14.0* 14.2* 12.0*   RBC 5.16 5.33 5.34   HGB 16.7 17.2 17.2   HCT 46.3 47.0 47.2   MCV 90 88 88   MCH 32.4 32.3 32.2   MCHC 36.1 36.6* 36.4   RDW 12.7 12.5 12.4    337 281       Last Comprehensive Metabolic panel:  Recent Labs   Lab Test 07/21/25 0636 07/20/25 0518 07/19/25 0521   * 135 132*   POTASSIUM 4.0 3.9 3.6   CHLORIDE 97* 100 98   CO2 23 21* 21*   ANIONGAP 12 14 13   * 109* 109*   BUN 13.1 10.7 8.5   CR 0.92 0.90 0.86   GFRESTIMATED 89 >90 >90   NURIS 8.3* 8.4* 8.3*   BILITOTAL 0.6 1.0 0.9   ALKPHOS 61 73 53   ALT 58 58 37   AST 41 67* 32       Recent Labs   Lab Test 07/21/25 0636 07/20/25 0518 07/19/25 0521   MAG 2.0 1.9 1.7   ALBUMIN 3.2* 3.2* 3.1*   PHOS 3.1 3.6 3.7     ASSESSMENT:    6 Days Post-Op from Procedure(s):  LAPAROTOMY, Takedown Splenic Flexure, Hartmanns Procedure with Creation End Ostomy  COLECTOMY, SIGMOID, OPEN.     Doing well.    PLAN: Will discharged home.  Follow-up in 1 week.  Will discontinue drains today.

## 2025-07-21 NOTE — DISCHARGE SUMMARY
INPATIENT DISCHARGE SUMMARY  7/21/2025    Patient'S Name: Samy Lu    Admitting Physician of Record: Taiwo Briceno MD    Discharging Physician: Taiwo Briceno MD    Date of admission: 7/13/2025     Date of discharge: 7/21/2025    Admitting diagnosis: Diverticulitis of colon with perforation [K57.20]  Perforated bowel (H) [K63.1]    Discharge diagnosis: Same    Procedures: Procedure(s):  LAPAROTOMY, Takedown Splenic Flexure, Hartmanns Procedure with Creation End Ostomy  COLECTOMY, SIGMOID, OPEN    Consultants: Medicine    Hospital course: The patient was admitted to the hospital and started on IV antibiotics.  The patient continued to get worse and a repeat CAT scan was obtained.  At this point a large amount of free air was noted.  The patient was then taken to the operating room and underwent an Procedure(s):  LAPAROTOMY, Takedown Splenic Flexure, Hartmanns Procedure with Creation End Ostomy  COLECTOMY, SIGMOID, OPEN.  The patient tolerated the procedure(s) well and was transferred to the elizalde.  His postoperative course has been completely unremarkable.  At the time of discharge he is eating a Regular diet, is having excellent pain control on oral medications, and is passing gas and is having stool in his ostomy bag. The patient will be discharged home in good condition.    Discharge instructions include:    Patient will be discharged to Home   Diet: Current Diet:  Active Diet and Nourishment Order   Procedures    Low Fiber Diet    Diet         Activity : no lifting restrictions and no liting over 10 pounds for 6 weeks   Follow-up:     The patient will follow up with his primary care provider in 1 weeks.      The patient will follow up with me in 1 weeks.   Medications include:    All prior medications    Oxycodone (Norco) for pain control.    Cipro 750 mg twice daily for 5 days    Flagyl 500 mg 4 times daily for 5 days

## 2025-07-21 NOTE — PROGRESS NOTES
"  Assessment & Plan     Colitis  Likely infectious.  Has some ongoing colitis visible on the CT done recently.  Having pain in the abdomen.  Seeing surgery tomorrow.  Consider further workup with GI for colitis.  He is s/p surgery with colostomy present.  Cares through surgery.  Update cbc showing stable slightly elevated wbc. We will follow this with ongoing sx as well.    - CBC with platelets and differential; Future    Diverticulitis of colon with perforation  As above.  Reviewed pathology, etc.  Nice discussion.    - CBC with platelets and differential; Future  MED REC REQUIRED  Post Medication Reconciliation Status:  Discharge medications reconciled, continue medications without change  BMI  Estimated body mass index is 23.63 kg/m  as calculated from the following:    Height as of 7/13/25: 1.753 m (5' 9\").    Weight as of this encounter: 72.6 kg (160 lb).           Suman Hidalgo is a 70 year old, presenting for the following health issues:  Hospital F/U        7/28/2025     9:51 AM   Additional Questions   Roomed by Aisha   Accompanied by wife     [facility]        Hospital Follow-up Visit:    Hospital/Nursing Home/IP Rehab Facility: [facility]  Most Recent Admission Date: 7/26/2025   Most Recent Admission Diagnosis:      Most Recent Discharge Date: 7/26/2025   Most Recent Discharge Diagnosis: Non-specific colitis - K52.9   Do you have any other stressors you would like to discuss with your provider? No    Problems taking medications regularly:  None  Medication changes since discharge: None  Problems adhering to non-medication therapy:  None    Summary of hospitalization:  [facility] discharge summary reviewed  Diagnostic Tests/Treatments reviewed.  Follow up needed: none  Other Healthcare Providers Involved in Patient s Care:         None  Update since discharge: improved.         Plan of care communicated with patient                   Review of Systems  Constitutional, HEENT, " cardiovascular, pulmonary, gi and gu systems are negative, except as otherwise noted.      Objective    /68   Pulse 79   Temp 97.4  F (36.3  C) (Tympanic)   Wt 72.6 kg (160 lb)   SpO2 98%   BMI 23.63 kg/m    Body mass index is 23.63 kg/m .  Physical Exam   GENERAL: alert and no distress  NECK: no adenopathy, no asymmetry, masses, or scars  RESP: lungs clear to auscultation - no rales, rhonchi or wheezes  CV: regular rate and rhythm, normal S1 S2, no S3 or S4, no murmur, click or rub, no peripheral edema  ABDOMEN: colostomy in place.  Some irritation to the skin with the tape present.  Normal looking ostomy.    MS: no gross musculoskeletal defects noted, no edema    Cbc as above.  Stable.      The longitudinal plan of care for the diagnosis(es)/condition(s) as documented were addressed during this visit. Due to the added complexity in care, I will continue to support Rocky in the subsequent management and with ongoing continuity of care.        Signed Electronically by: Reji Flynn MD

## 2025-07-21 NOTE — PLAN OF CARE
Goal Outcome Evaluation:    Plan of Care Reviewed With: Patient    Overall Patient Progress: improving     Pt alert and oriented x3, calm and cooperative. Vs and assessment as charted. Denies pain. ALISIA drains removed by Dr. Briceno. Midline incision CDI. Ostomy bag changes as was leaking.Family here and pt discharging home.   Patient discharged at 2:29 PM via wheel chair accompanied by spouse, son, and daughter and staff. Prescriptions sent to patients preferred pharmacy. All belongings sent with patient.     Discharge instructions reviewed with Pt and wife. Listed belongings gathered and returned to patient. Pt confirms all belongings departing with pt.     Patient discharged to home .     Surgical Patient   Surgical Procedures during stay: N/A  Did patient receive discharge instruction on wound care and recognition of infection symptoms? Yes    MISC  Follow up appointment made:  Yes  Home medications returned to patient: N/A  Patient reports pain was well managed at discharge: Yes

## 2025-07-21 NOTE — TELEPHONE ENCOUNTER
1:08 PM    Reason for Call: OVERBOOK    Patient is having the following symptoms: hosp follow up/discharging today/colostomy  The patient is requesting an appointment for 7 days with Dr Flynn.    Was an appointment offered for this call? No  If yes : Appointment type              Date    Preferred method for responding to this message: Telephone Call  What is your phone number ?[phone]    If we cannot reach you directly, may we leave a detailed response at the number you provided? Yes    Can this message wait until your PCP/provider returns, if unavailable today? Not applicable    Sera Mcadams

## 2025-07-21 NOTE — PHARMACY
Pharmacy Antimicrobial Stewardship Assessment     Current Antimicrobial Therapy:  Anti-infectives (From now, onward)      Start     Dose/Rate Route Frequency Ordered Stop    25 1000  ciprofloxacin (CIPRO) tablet 750 mg         750 mg Oral EVERY 12 HOURS SCHEDULED 25 0940      25 1000  metroNIDAZOLE (FLAGYL) tablet 500 mg         500 mg Oral 4 TIMES DAILY 25 0940            Indication: intra-abdominal infection    Days of Therapy: 9     Pertinent Labs:    Recent labs: (last 7 days)     07/15/25  0528 25  0456 25  0446 25  0507 25  0521 25  0518 25  0636   WBC 19.9* 20.8* 17.0* 12.7* 12.0* 14.2* 14.0*       Temperature:  Temp (24hrs), Av.7  F (36.5  C), Min:97  F (36.1  C), Max:98.2  F (36.8  C)      Culture Results:   30-Day Micro Results       Collected Updated Procedure Result Status      2025 1244 2025 1321 COVID-19 Virus (Coronavirus) by PCR Nasopharyngeal [[id number]]    Swab from Nasopharyngeal    Final result Component Value   SARS CoV2 PCR Negative   NEGATIVE: SARS-CoV-2 (COVID-19) RNA not detected, presumed negative.            2025 0639 2025 1209 Blood Culture Peripheral blood (BC) Arm, Left [[id number]]   Peripheral blood (BC) from Arm, Left    Final result Component Value   Culture No Growth               2025 0639 2025 1209 Blood Culture Peripheral blood (BC) Hand, Right [[id number]]   Peripheral blood (BC) from Hand, Right    Final result Component Value   Culture No Growth                   Recommendations/Interventions:  1. None at this time.    Bambi Quinones, [facility]  2025

## 2025-07-24 ENCOUNTER — PATIENT OUTREACH (OUTPATIENT)
Dept: CARE COORDINATION | Facility: OTHER | Age: 70
End: 2025-07-24

## 2025-07-24 NOTE — PROGRESS NOTES
Clinic Care Coordination Contact  Transitions of Care Outreach  No chief complaint on file.      Most Recent Admission Date: 7/13/2025   Most Recent Admission Diagnosis: Diverticulitis of colon with perforation - K57.20  Perforated bowel (H) - K63.1     Most Recent Discharge Date: 7/21/2025   Most Recent Discharge Diagnosis: Diverticulitis of colon with perforation - K57.20  Perforated bowel (H) - K63.1     Transitions of Care Assessment    Discharge Assessment  How are you doing now that you are home?: Patient with no concerns. states he does experience intermittent abdominal pressure, much improvement.  How are your symptoms? (Red Flag symptoms escalate to triage hotline per guidelines): Improved  Do you know how to contact your clinic care team if you have future questions or changes to your health status? : Yes  Does the patient have their discharge instructions? : Yes  Does the patient have questions regarding their discharge instructions? : No  Were you started on any new medications or were there changes to any of your previous medications? : Yes (oxycodone prn pain; Cipro 750 mg twice daily for 5 days; Flagyl 500 mg 4 times daily for 5 days.)  Does the patient have all of their medications?: Yes  Do you have questions regarding any of your medications? : No  Do you have all of your needed medical supplies or equipment (DME)?  (i.e. oxygen tank, CPAP, cane, etc.): Yes    Post-op ([facility] CTA Only)  If the patient had a surgery or procedure, do they have any questions for a nurse?: Yes (see comment)    Post-op (Clinicians Only)  Did the patient have surgery or a procedure: Yes  Incision: healing (no concerns with incision)  Drainage: No  Bleeding: none  Fever: No  Chills: No  Redness: No  Warmth: No  Swelling: No  Incision site pain: No  Closure: staple (22 staples)  Eating & Drinking: eating and drinking without complaints/concerns  PO Intake: low fiber  Additional Symptoms:  (denies nausea, vomiting or decrease  in appetite. Eating soups/toast and cream of wheat in the mornings.)  Bowel Function: normal  Date of last BM: 07/24/25  Urinary Status: voiding without complaint/concerns        Follow up Plan     Discharge Follow-Up  Discharge follow up appointment scheduled in alignment with recommended follow up timeframe or Transitions of Risk Category? (Low = within 30 days; Moderate= within 14 days; High= within 7 days): Yes  Discharge Follow Up Appointment Date: 07/28/25 (Dr. Flynn - PCP)  Discharge Follow Up Appointment Scheduled with?: Primary Care Provider (Appointment with Lo Bangura - General Surgery on 7/29/25)    Future Appointments   Date Time Provider Department Center   7/28/2025 10:15 AM Reji Flynn MD [facility]   7/29/2025  1:20 PM [facility]       Outpatient Plan as outlined on AVS reviewed with patient.    For any urgent concerns, please contact our 24 hour nurse triage line: [phone] ([phone])       Caryl Madrid RN

## 2025-07-26 ENCOUNTER — HOSPITAL ENCOUNTER (EMERGENCY)
Facility: HOSPITAL | Age: 70
Discharge: HOME | End: 2025-07-26
Attending: EMERGENCY MEDICINE
Payer: MEDICARE

## 2025-07-26 ENCOUNTER — APPOINTMENT (OUTPATIENT)
Dept: CT IMAGING | Facility: HOSPITAL | Age: 70
End: 2025-07-26
Attending: EMERGENCY MEDICINE
Payer: MEDICARE

## 2025-07-26 VITALS
SYSTOLIC BLOOD PRESSURE: 129 MMHG | OXYGEN SATURATION: 97 % | HEART RATE: 78 BPM | DIASTOLIC BLOOD PRESSURE: 91 MMHG | RESPIRATION RATE: 16 BRPM | TEMPERATURE: 97.5 F

## 2025-07-26 DIAGNOSIS — K52.9 NON-SPECIFIC COLITIS: Primary | ICD-10-CM

## 2025-07-26 LAB
ALBUMIN SERPL BCG-MCNC: 3.3 G/DL (ref 3.5–5.2)
ALBUMIN UR-MCNC: NEGATIVE MG/DL
ALP SERPL-CCNC: 44 U/L (ref 40–150)
ALT SERPL W P-5'-P-CCNC: 24 U/L (ref 0–70)
ANION GAP SERPL CALCULATED.3IONS-SCNC: 13 MMOL/L (ref 7–15)
APPEARANCE UR: CLEAR
AST SERPL W P-5'-P-CCNC: 21 U/L (ref 0–45)
BASOPHILS # BLD AUTO: 0.1 10E3/UL (ref 0–0.2)
BASOPHILS NFR BLD AUTO: 1 %
BILIRUB SERPL-MCNC: 0.5 MG/DL
BILIRUB UR QL STRIP: NEGATIVE
BUN SERPL-MCNC: 10.1 MG/DL (ref 8–23)
CALCIUM SERPL-MCNC: 8.2 MG/DL (ref 8.8–10.4)
CHLORIDE SERPL-SCNC: 97 MMOL/L (ref 98–107)
COLOR UR AUTO: ABNORMAL
CREAT SERPL-MCNC: 0.83 MG/DL (ref 0.67–1.17)
EGFRCR SERPLBLD CKD-EPI 2021: >90 ML/MIN/1.73M2
EOSINOPHIL # BLD AUTO: 0.1 10E3/UL (ref 0–0.7)
EOSINOPHIL NFR BLD AUTO: 1 %
ERYTHROCYTE [DISTWIDTH] IN BLOOD BY AUTOMATED COUNT: 12.8 % (ref 10–15)
GLUCOSE SERPL-MCNC: 88 MG/DL (ref 70–99)
GLUCOSE UR STRIP-MCNC: NEGATIVE MG/DL
HCO3 SERPL-SCNC: 23 MMOL/L (ref 22–29)
HCT VFR BLD AUTO: 44 % (ref 40–53)
HGB BLD-MCNC: 15.9 G/DL (ref 13.3–17.7)
HGB UR QL STRIP: NEGATIVE
IMM GRANULOCYTES # BLD: 0.1 10E3/UL
IMM GRANULOCYTES NFR BLD: 1 %
KETONES UR STRIP-MCNC: NEGATIVE MG/DL
LACTATE SERPL-SCNC: 0.7 MMOL/L (ref 0.7–2)
LEUKOCYTE ESTERASE UR QL STRIP: NEGATIVE
LYMPHOCYTES # BLD AUTO: 1.6 10E3/UL (ref 0.8–5.3)
LYMPHOCYTES NFR BLD AUTO: 14 %
MCH RBC QN AUTO: 32.5 PG (ref 26.5–33)
MCHC RBC AUTO-ENTMCNC: 36.1 G/DL (ref 31.5–36.5)
MCV RBC AUTO: 90 FL (ref 78–100)
MONOCYTES # BLD AUTO: 1.3 10E3/UL (ref 0–1.3)
MONOCYTES NFR BLD AUTO: 11 %
MUCOUS THREADS #/AREA URNS LPF: PRESENT /LPF
NEUTROPHILS # BLD AUTO: 8.4 10E3/UL (ref 1.6–8.3)
NEUTROPHILS NFR BLD AUTO: 73 %
NITRATE UR QL: NEGATIVE
NRBC # BLD AUTO: 0 10E3/UL
NRBC BLD AUTO-RTO: 0 /100
PH UR STRIP: 6.5 [PH] (ref 4.7–8)
PLATELET # BLD AUTO: 428 10E3/UL (ref 150–450)
POTASSIUM SERPL-SCNC: 3.9 MMOL/L (ref 3.4–5.3)
PROT SERPL-MCNC: 6.1 G/DL (ref 6.4–8.3)
RBC # BLD AUTO: 4.89 10E6/UL (ref 4.4–5.9)
RBC URINE: 1 /HPF
SODIUM SERPL-SCNC: 133 MMOL/L (ref 135–145)
SP GR UR STRIP: 1.01 (ref 1–1.03)
SQUAMOUS EPITHELIAL: 0 /HPF
UROBILINOGEN UR STRIP-MCNC: NORMAL MG/DL
WBC # BLD AUTO: 11.5 10E3/UL (ref 4–11)
WBC URINE: <1 /HPF

## 2025-07-26 PROCEDURE — 82040 ASSAY OF SERUM ALBUMIN: CPT | Performed by: EMERGENCY MEDICINE

## 2025-07-26 PROCEDURE — 99284 EMERGENCY DEPT VISIT MOD MDM: CPT | Performed by: EMERGENCY MEDICINE

## 2025-07-26 PROCEDURE — 99285 EMERGENCY DEPT VISIT HI MDM: CPT | Mod: 25 | Performed by: EMERGENCY MEDICINE

## 2025-07-26 PROCEDURE — 250N000011 HC RX IP 250 OP 636: Performed by: EMERGENCY MEDICINE

## 2025-07-26 PROCEDURE — 36415 COLL VENOUS BLD VENIPUNCTURE: CPT | Performed by: EMERGENCY MEDICINE

## 2025-07-26 PROCEDURE — 74177 CT ABD & PELVIS W/CONTRAST: CPT

## 2025-07-26 PROCEDURE — 81001 URINALYSIS AUTO W/SCOPE: CPT | Performed by: EMERGENCY MEDICINE

## 2025-07-26 PROCEDURE — 74177 CT ABD & PELVIS W/CONTRAST: CPT | Mod: 26 | Performed by: RADIOLOGY

## 2025-07-26 PROCEDURE — 83605 ASSAY OF LACTIC ACID: CPT | Performed by: EMERGENCY MEDICINE

## 2025-07-26 PROCEDURE — 85025 COMPLETE CBC W/AUTO DIFF WBC: CPT | Performed by: EMERGENCY MEDICINE

## 2025-07-26 RX ORDER — IOPAMIDOL 755 MG/ML
83 INJECTION, SOLUTION INTRAVASCULAR ONCE
Status: COMPLETED | OUTPATIENT
Start: 2025-07-26 | End: 2025-07-26

## 2025-07-26 RX ADMIN — IOPAMIDOL 83 ML: 755 INJECTION, SOLUTION INTRAVENOUS at 15:22

## 2025-07-26 ASSESSMENT — ACTIVITIES OF DAILY LIVING (ADL)
ADLS_ACUITY_SCORE: 58

## 2025-07-26 ASSESSMENT — COLUMBIA-SUICIDE SEVERITY RATING SCALE - C-SSRS
6. HAVE YOU EVER DONE ANYTHING, STARTED TO DO ANYTHING, OR PREPARED TO DO ANYTHING TO END YOUR LIFE?: NO
1. IN THE PAST MONTH, HAVE YOU WISHED YOU WERE DEAD OR WISHED YOU COULD GO TO SLEEP AND NOT WAKE UP?: NO
2. HAVE YOU ACTUALLY HAD ANY THOUGHTS OF KILLING YOURSELF IN THE PAST MONTH?: NO

## 2025-07-26 ASSESSMENT — ENCOUNTER SYMPTOMS
ABDOMINAL DISTENTION: 1
CHILLS: 0
FEVER: 0
CONSTIPATION: 1
VOMITING: 0
COLOR CHANGE: 0

## 2025-07-26 NOTE — ED TRIAGE NOTES
Pt is here with c/o colostomy issue ongoing 2 days, recent colostomy placed  2 weeks ago, pt notes more gas and having to press on colostomy bag to release. Pt also notes decreased output in colostomy bag. Abd surgical incision appears intact no redness or drainage, colostomy pinkish red. Denies vomit , notes nausea and diarrhea

## 2025-07-26 NOTE — ED NOTES
Patient back from CT. Reports having more output in ostomy, would like MD to look at it. Dr. Shah notified.

## 2025-07-26 NOTE — ED PROVIDER NOTES
History     Chief Complaint   Patient presents with    Post-op Problem     HPI  Samy Lu is a 70 year old male who presents with chief complaint of postoperative pain, and constipation.  He is also related that he has difficulty passing urine.  Patient went to the operating room on 7/15 for perforated diverticulitis.  He had a Chavo pouch with Dr. Briceno of general surgery at this facility, and was discharged a few days later.  Patient relates that over the past day he has had minimal output from his ostomy.  He feels as though pressure is building up in his abdomen, but nothing comes out of the ostomy unless he manually presses on the right side of his abdomen opposite the ostomy.  He can then get gas to pass through the ostomy. He relates that if he does not do this, eventually he passes small amounts of stool and mucus from his rectum. He is concerned about the possibility of bowel obstruction.     Allergies:  No Known Allergies    Problem List:    Patient Active Problem List    Diagnosis Date Noted    Perforated bowel (H) 07/15/2025     Priority: Medium    Diverticulitis of colon with perforation 07/13/2025     Priority: Medium    ACP (advance care planning) 03/01/2017     Priority: Medium     Advance Care Planning 3/1/2017: ACP Review of Chart / Resources Provided:  Reviewed chart for advance care plan.  Samy Lu has been provided information and resources to begin or update their advance care plan.  Added by Lo Ortega                Past Medical History:    No past medical history on file.    Past Surgical History:    Past Surgical History:   Procedure Laterality Date    ABDOMEN SURGERY      right IHR    ABDOMEN SURGERY      2 hernia repairs    ABDOMEN SURGERY      Umbilical hernia repair    COLONOSCOPY  10/21/2009    Due 2014    COLONOSCOPY N/A 11/19/2018    Procedure: COLONOSCOPY With POLYPECTOMY,RESOLUTION CLIP, BIOPSY;  Surgeon: Jesus Shi MD;  Location: [facility]     "LAPAROTOMY EXPLORATORY N/A 7/15/2025    Procedure: LAPAROTOMY, Takedown Splenic Flexure, Hartmanns Procedure with Creation End Ostomy;  Surgeon: Taiow Briceno MD;  Location: [facility]    RECTAL PROLAPSE REPAIR N/A 7/15/2025    Procedure: COLECTOMY, SIGMOID, OPEN;  Surgeon: Taiwo Briceno MD;  Location: [facility]       Family History:    Family History   Problem Relation Age of Onset    Cancer Father         Non Hodgkins Lymphoma    Diabetes Paternal Grandmother        Social History:  Marital Status:   [2]  Social History     Tobacco Use    Smoking status: Former     Current packs/day: 0.00     Average packs/day: 0.3 packs/day for 37.0 years (9.3 ttl pk-yrs)     Types: Cigarettes     Start date: 1969     Quit date: 2006     Years since quittin.5    Smokeless tobacco: Never   Vaping Use    Vaping status: Never Used   Substance Use Topics    Alcohol use: Yes     Comment: rare    Drug use: Yes     Types: Marijuana     Comment: \"for glaucoma\"        Medications:    ciprofloxacin (CIPRO) 750 MG tablet  metroNIDAZOLE (FLAGYL) 500 MG tablet  timolol maleate (TIMOPTIC) 0.5 % ophthalmic solution          Review of Systems   Constitutional:  Negative for chills and fever.   Gastrointestinal:  Positive for abdominal distention and constipation. Negative for vomiting.   Skin:  Negative for color change.       Physical Exam   BP: 124/87  Pulse: 83  Temp: 97.5  F (36.4  C)  Resp: 18  SpO2: 97 %      Physical Exam  Constitutional:       General: He is not in acute distress.     Appearance: He is well-developed. He is not diaphoretic.   HENT:      Head: Normocephalic and atraumatic.      Nose: Nose normal. No congestion.      Mouth/Throat:      Mouth: Mucous membranes are moist.      Pharynx: Oropharynx is clear.   Eyes:      General: No scleral icterus.     Extraocular Movements: Extraocular movements intact.      Conjunctiva/sclera: Conjunctivae normal.      Pupils: Pupils are equal, round, and " reactive to light.   Cardiovascular:      Rate and Rhythm: Normal rate.   Pulmonary:      Effort: Pulmonary effort is normal.   Abdominal:      General: Abdomen is flat.      Comments: Ostomy in the left side of the abdomen.  There is pink bowel present in the ostomy.  Pressure with the finger does reduce the bowel below the level of the ostomy.  There are active bowel sounds in all 4 quadrants.  There is no specific tenderness.   Musculoskeletal:         General: No tenderness or deformity. Normal range of motion.      Cervical back: Normal range of motion and neck supple.   Lymphadenopathy:      Cervical: No cervical adenopathy.   Skin:     General: Skin is warm and dry.      Capillary Refill: Capillary refill takes less than 2 seconds.      Findings: No rash.   Neurological:      General: No focal deficit present.      Mental Status: He is alert and oriented to person, place, and time.      Cranial Nerves: No cranial nerve deficit.      Sensory: No sensory deficit.      Coordination: Coordination normal.         ED Course        Procedures         Recent Results (from the past 24 hours)   UA with Microscopic reflex to Culture    Specimen: Urine, Midstream   Result Value Ref Range    Color Urine Light Yellow Colorless, Straw, Light Yellow, Yellow    Appearance Urine Clear Clear    Glucose Urine Negative Negative mg/dL    Bilirubin Urine Negative Negative    Ketones Urine Negative Negative mg/dL    Specific Gravity Urine 1.013 1.003 - 1.035    Blood Urine Negative Negative    pH Urine 6.5 4.7 - 8.0    Protein Albumin Urine Negative Negative mg/dL    Urobilinogen Urine Normal Normal mg/dL    Nitrite Urine Negative Negative    Leukocyte Esterase Urine Negative Negative    Mucus Urine Present (A) None Seen /LPF    RBC Urine 1 <=2 /HPF    WBC Urine <1 <=5 /HPF    Squamous Epithelials Urine 0 <=1 /HPF    Narrative    Urine Culture not indicated   CBC with Platelets and Differential (Limited Occurrences)    Narrative     The following orders were created for panel order CBC with Platelets and Differential (Limited Occurrences).  Procedure                               Abnormality         Status                     ---------                               -----------         ------                     CBC with platelets and ...[[id number]]  Abnormal            Final result                 Please view results for these tests on the individual orders.   Comprehensive Metabolic Panel (Limited Occurrences)   Result Value Ref Range    Sodium 133 (L) 135 - 145 mmol/L    Potassium 3.9 3.4 - 5.3 mmol/L    Carbon Dioxide (CO2) 23 22 - 29 mmol/L    Anion Gap 13 7 - 15 mmol/L    Urea Nitrogen 10.1 8.0 - 23.0 mg/dL    Creatinine 0.83 0.67 - 1.17 mg/dL    GFR Estimate >90 >60 mL/min/1.73m2    Calcium 8.2 (L) 8.8 - 10.4 mg/dL    Chloride 97 (L) 98 - 107 mmol/L    Glucose 88 70 - 99 mg/dL    Alkaline Phosphatase 44 40 - 150 U/L    AST 21 0 - 45 U/L    ALT 24 0 - 70 U/L    Protein Total 6.1 (L) 6.4 - 8.3 g/dL    Albumin 3.3 (L) 3.5 - 5.2 g/dL    Bilirubin Total 0.5 <=1.2 mg/dL   Lactic acid whole blood with 1x repeat in 2 hr when >2   Result Value Ref Range    Lactic Acid, Initial 0.7 0.7 - 2.0 mmol/L   CBC with platelets and differential   Result Value Ref Range    WBC Count 11.5 (H) 4.0 - 11.0 10e3/uL    RBC Count 4.89 4.40 - 5.90 10e6/uL    Hemoglobin 15.9 13.3 - 17.7 g/dL    Hematocrit 44.0 40.0 - 53.0 %    MCV 90 78 - 100 fL    MCH 32.5 26.5 - 33.0 pg    MCHC 36.1 31.5 - 36.5 g/dL    RDW 12.8 10.0 - 15.0 %    Platelet Count 428 150 - 450 10e3/uL    % Neutrophils 73 %    % Lymphocytes 14 %    % Monocytes 11 %    % Eosinophils 1 %    % Basophils 1 %    % Immature Granulocytes 1 %    NRBCs per 100 WBC 0 <1 /100    Absolute Neutrophils 8.4 (H) 1.6 - 8.3 10e3/uL    Absolute Lymphocytes 1.6 0.8 - 5.3 10e3/uL    Absolute Monocytes 1.3 0.0 - 1.3 10e3/uL    Absolute Eosinophils 0.1 0.0 - 0.7 10e3/uL    Absolute Basophils 0.1 0.0 - 0.2 10e3/uL    Absolute  Immature Granulocytes 0.1 <=0.4 10e3/uL    Absolute NRBCs 0.0 10e3/uL   CT Abdomen Pelvis w Contrast    Narrative    EXAM: CT ABDOMEN PELVIS W CONTRAST  LOCATION: [facility]  DATE: 7/26/2025    INDICATION: Ileus, difficulty passing stool and gas via ostomy  COMPARISON: 7/15/2025 CT  TECHNIQUE: CT scan of the abdomen and pelvis was performed following injection of IV contrast. Multiplanar reformats were obtained. Dose reduction techniques were used.  CONTRAST: Isovue 370 83mL    FINDINGS:   LOWER CHEST: Minimal atelectasis.    HEPATOBILIARY: Normal.    PANCREAS: Normal.    SPLEEN: Normal.    ADRENAL GLANDS: Normal.    KIDNEYS/BLADDER: Normal.    BOWEL: Postop partial left colectomy with Stoner's pouch and colostomy left lower abdomen. Nothing for obstruction and no dilated loops of bowel. Nothing for ileus. Mild colitis most prominent in the transverse colon.    No free air or free fluid. No abscesses.    LYMPH NODES: Normal.    VASCULATURE: Normal.    PELVIC ORGANS: Normal.    MUSCULOSKELETAL: Normal.      Impression    IMPRESSION:   1.  Postop partial colectomy with Stoner's pouch and colostomy left lower abdomen.    2.  Mild diffuse colitis.    3.  No free air or abscesses.    4.  Nothing for bowel obstruction or ileus.       Medications   iopamidol (ISOVUE-370) solution 83 mL (83 mLs Intravenous $Given 7/26/25 1522)   sodium chloride (PF) 0.9% PF flush 50 mL (50 mLs Intravenous $Given 7/26/25 1522)       Assessments & Plan (with Medical Decision Making)     I have reviewed the nursing notes.    I have reviewed the findings, diagnosis, plan and need for follow up with the  Medical Decision Making  The patient's presentation was of moderate complexity (potential surgery complication).    The patient's evaluation involved:  review of 3+ test result(s) ordered prior to this encounter (see separate area of note for details)  ordering and/or review of 3+ test(s) in this encounter (see separate area of  note for details)    The patient's management necessitated moderate risk (IV contrast administration).    Patient had labs and ultimately we agreed on plan for CT imaging given patient's concern for obstruction.  Upon returning from CT scan, patient had a large liquid green bowel movement into his colostomy bag.  Patient does not have significant tenderness.  The CT scan demonstrated what is described as mild diffuse colitis.  Patient's lactic acid is normal, and he certainly does not appear to have pain out of proportion on exam.  I discussed findings with  who recommends short-term follow-up, and patient has discharged in stable condition after being given reassurance, he does not have evidence of acute obstruction at this time, and colitis does not appear to warrant antibiotics given appearance and overall clinical picture at this time.    Discharge Medication List as of 7/26/2025  4:07 PM          Final diagnoses:   Non-specific colitis       7/26/2025   [facility]       Hira Shah DO  07/26/25 0758

## 2025-07-28 ENCOUNTER — OFFICE VISIT (OUTPATIENT)
Dept: FAMILY MEDICINE | Facility: OTHER | Age: 70
End: 2025-07-28
Attending: FAMILY MEDICINE
Payer: MEDICARE

## 2025-07-28 VITALS
WEIGHT: 160 LBS | TEMPERATURE: 97.4 F | BODY MASS INDEX: 23.63 KG/M2 | SYSTOLIC BLOOD PRESSURE: 112 MMHG | OXYGEN SATURATION: 98 % | DIASTOLIC BLOOD PRESSURE: 68 MMHG | HEART RATE: 79 BPM

## 2025-07-28 DIAGNOSIS — K57.20 DIVERTICULITIS OF COLON WITH PERFORATION: ICD-10-CM

## 2025-07-28 DIAGNOSIS — K52.9 COLITIS: Primary | ICD-10-CM

## 2025-07-28 LAB
BASOPHILS # BLD AUTO: 0.1 10E3/UL (ref 0–0.2)
BASOPHILS NFR BLD AUTO: 0 %
EOSINOPHIL # BLD AUTO: 0.1 10E3/UL (ref 0–0.7)
EOSINOPHIL NFR BLD AUTO: 1 %
ERYTHROCYTE [DISTWIDTH] IN BLOOD BY AUTOMATED COUNT: 12.8 % (ref 10–15)
HCT VFR BLD AUTO: 46.6 % (ref 40–53)
HGB BLD-MCNC: 16.2 G/DL (ref 13.3–17.7)
IMM GRANULOCYTES # BLD: 0.1 10E3/UL
IMM GRANULOCYTES NFR BLD: 0 %
LYMPHOCYTES # BLD AUTO: 1.6 10E3/UL (ref 0.8–5.3)
LYMPHOCYTES NFR BLD AUTO: 13 %
MCH RBC QN AUTO: 31.6 PG (ref 26.5–33)
MCHC RBC AUTO-ENTMCNC: 34.8 G/DL (ref 31.5–36.5)
MCV RBC AUTO: 91 FL (ref 78–100)
MONOCYTES # BLD AUTO: 1.1 10E3/UL (ref 0–1.3)
MONOCYTES NFR BLD AUTO: 9 %
NEUTROPHILS # BLD AUTO: 9.4 10E3/UL (ref 1.6–8.3)
NEUTROPHILS NFR BLD AUTO: 77 %
PLATELET # BLD AUTO: 476 10E3/UL (ref 150–450)
RBC # BLD AUTO: 5.13 10E6/UL (ref 4.4–5.9)
WBC # BLD AUTO: 12.3 10E3/UL (ref 4–11)

## 2025-07-28 PROCEDURE — 85014 HEMATOCRIT: CPT | Mod: ZL | Performed by: FAMILY MEDICINE

## 2025-07-28 PROCEDURE — 99496 TRANSJ CARE MGMT HIGH F2F 7D: CPT | Performed by: FAMILY MEDICINE

## 2025-07-28 PROCEDURE — 36415 COLL VENOUS BLD VENIPUNCTURE: CPT | Mod: ZL | Performed by: FAMILY MEDICINE

## 2025-07-28 PROCEDURE — G0463 HOSPITAL OUTPT CLINIC VISIT: HCPCS

## 2025-07-28 ASSESSMENT — PAIN SCALES - GENERAL: PAINLEVEL_OUTOF10: NO PAIN (0)

## 2025-07-29 ENCOUNTER — OFFICE VISIT (OUTPATIENT)
Dept: SURGERY | Facility: OTHER | Age: 70
End: 2025-07-29
Attending: NURSE PRACTITIONER
Payer: MEDICARE

## 2025-07-29 VITALS
TEMPERATURE: 97.2 F | DIASTOLIC BLOOD PRESSURE: 66 MMHG | SYSTOLIC BLOOD PRESSURE: 126 MMHG | RESPIRATION RATE: 16 BRPM | HEART RATE: 93 BPM | OXYGEN SATURATION: 97 %

## 2025-07-29 DIAGNOSIS — Z90.49 STATUS POST COLECTOMY: Primary | ICD-10-CM

## 2025-07-29 PROCEDURE — G0463 HOSPITAL OUTPT CLINIC VISIT: HCPCS

## 2025-07-29 ASSESSMENT — PAIN SCALES - GENERAL: PAINLEVEL_OUTOF10: MILD PAIN (2)

## 2025-07-29 NOTE — PROGRESS NOTES
CLINIC NOTE - POST-OP SURGERY  7/29/2025    Patient:Samy Lu    Procedure: Exploratory laparotomy, sigmoid colectomy, takedown of splenic flexure, Stoner's procedure with end colostomy, washout of abdomen, and partial omentectomy.     This is a 70 year old male who is 2 weeks s/p Exploratory laparotomy, sigmoid colectomy, takedown of splenic flexure, Stoner's procedure with end colostomy, washout of abdomen, and partial omentectomy.  The patient has no complaints today.  He was seen in the ER 3 days ago for abdominal pain and bloating.  He states those symptoms have resolved at this time.    Current Medications:  Current Outpatient Medications   Medication Sig Dispense Refill    timolol maleate (TIMOPTIC) 0.5 % ophthalmic solution Place 1 drop into the right eye daily.         Allergies:  No Known Allergies    PHYSICAL EXAM:   Vital signs: /66 (BP Location: Right arm, Cuff Size: Adult Regular)   Pulse 93   Temp 97.2  F (36.2  C) (Tympanic)   Resp 16   SpO2 97%    BMI: There is no height or weight on file to calculate BMI.   General: Normal, healthy, cooperative, in no acute distress, alert   Lungs: respirations are non-labored   Abdominal: non-distended, former drain sites are healing without signs/symptoms of infection   Wounds:  Well healed surgical scars consistent with his operation.     PATHOLOGY:  Case Report   Date Value Ref Range Status   07/15/2025   Final    Surgical Pathology Report                         Case: [id number]                                  Authorizing Provider:  Taiwo Briceno MD  Collected:           07/15/2025 01:18 PM          Ordering Location:     [facility]     Received:            07/16/2025 12:41 PM          Pathologist:           Matthew Pavon DO                                                         Specimens:   A) - Large Intestine, Colon, Sigmoid                                                                B) - Large Intestine,  Colon, Sigmoid, distal sigmoid                                                C) - Mesentery                                                                              Final Diagnosis   Date Value Ref Range Status   07/15/2025   Final    A.  Sigmoid colon, sigmoidectomy:  - Large extended diverticulum with evidence of associated perforated diverticulitis including localized fibrinopurulent serositis with scant entrapped fecal elements.  - Extensive diverticulosis.    B.  Sigmoid colon, distal, sigmoidectomy:  - Diverticulosis.    C.  Omentum, resection:  - Fibrinopurulent serositis containing entrapped fecal elements and patchy purulent omentitis.  - Skin and subcutis without abnormality.        Imaging:  CT scan completed 7/26/25 was reviewed.    ASSESSMENT:    70 year old male who is 2 weeks s/p Exploratory laparotomy, sigmoid colectomy, takedown of splenic flexure, Stoner's procedure with end colostomy, washout of abdomen, and partial omentectomy.  Doing well.     PLAN:   Some staples were removed from the incisional site without problems at today's appointment.    Follow-up 1 week.  Sooner with problems/concerns      Restrictions : Will continue lifting restrictions of no more than 10 pounds until 6 weeks after surgery

## 2025-08-05 ENCOUNTER — OFFICE VISIT (OUTPATIENT)
Dept: SURGERY | Facility: OTHER | Age: 70
End: 2025-08-05
Attending: NURSE PRACTITIONER
Payer: MEDICARE

## 2025-08-05 VITALS
SYSTOLIC BLOOD PRESSURE: 130 MMHG | TEMPERATURE: 98.3 F | DIASTOLIC BLOOD PRESSURE: 76 MMHG | RESPIRATION RATE: 18 BRPM | OXYGEN SATURATION: 96 % | HEART RATE: 89 BPM

## 2025-08-05 DIAGNOSIS — Z90.49 STATUS POST COLECTOMY: Primary | ICD-10-CM

## 2025-08-05 PROCEDURE — G0463 HOSPITAL OUTPT CLINIC VISIT: HCPCS

## 2025-08-05 ASSESSMENT — PAIN SCALES - GENERAL: PAINLEVEL_OUTOF10: MILD PAIN (3)

## 2025-08-12 ENCOUNTER — TELEPHONE (OUTPATIENT)
Dept: SURGERY | Facility: OTHER | Age: 70
End: 2025-08-12

## 2025-08-14 ENCOUNTER — OFFICE VISIT (OUTPATIENT)
Dept: SURGERY | Facility: OTHER | Age: 70
End: 2025-08-14
Attending: NURSE PRACTITIONER
Payer: MEDICARE

## 2025-08-14 VITALS
TEMPERATURE: 97.9 F | SYSTOLIC BLOOD PRESSURE: 123 MMHG | DIASTOLIC BLOOD PRESSURE: 85 MMHG | HEART RATE: 83 BPM | RESPIRATION RATE: 18 BRPM | OXYGEN SATURATION: 98 %

## 2025-08-14 DIAGNOSIS — Z90.49 STATUS POST COLECTOMY: Primary | ICD-10-CM

## 2025-08-14 PROCEDURE — G0463 HOSPITAL OUTPT CLINIC VISIT: HCPCS

## 2025-08-14 ASSESSMENT — PAIN SCALES - GENERAL: PAINLEVEL_OUTOF10: NO PAIN (0)

## 2025-08-20 ENCOUNTER — OFFICE VISIT (OUTPATIENT)
Dept: SURGERY | Facility: OTHER | Age: 70
End: 2025-08-20
Attending: NURSE PRACTITIONER
Payer: MEDICARE

## 2025-08-20 VITALS
TEMPERATURE: 97.4 F | DIASTOLIC BLOOD PRESSURE: 62 MMHG | SYSTOLIC BLOOD PRESSURE: 100 MMHG | OXYGEN SATURATION: 98 % | RESPIRATION RATE: 18 BRPM | HEART RATE: 82 BPM

## 2025-08-20 DIAGNOSIS — Z90.49 STATUS POST COLECTOMY: Primary | ICD-10-CM

## 2025-08-20 PROCEDURE — G0463 HOSPITAL OUTPT CLINIC VISIT: HCPCS | Mod: 25

## 2025-08-20 ASSESSMENT — PAIN SCALES - GENERAL: PAINLEVEL_OUTOF10: NO PAIN (0)

## (undated) DEVICE — CANISTER-SUCTION 2000CC

## (undated) DEVICE — ESU LIGASURE IMPACT OPEN SEALER/DVDR CVD LG JAW LF4418

## (undated) DEVICE — TUBING SUCTION 20FT N620A

## (undated) DEVICE — SLEEVE SCD EXPRESS KNEE LENGTH MED 9529

## (undated) DEVICE — DRAIN WOUND JACKSON PRATT 19FR PERFORATE TROCAR  SU130-0325

## (undated) DEVICE — ESU GROUND PAD ADULT W/CORD E7507

## (undated) DEVICE — TUBING-SUCTION 20FT

## (undated) DEVICE — TAPE MEDIPORE 4"X2YD 2864

## (undated) DEVICE — PREP CHLORAPREP 26ML TINTED HI-LITE ORANGE 930815

## (undated) DEVICE — SU VICRYL 2-0 SH 27" UND J417H

## (undated) DEVICE — PACK BASIN SET UP SUTCNBSBBA

## (undated) DEVICE — STPL RELOAD LINEAR CUT 75MM TCR75

## (undated) DEVICE — Device

## (undated) DEVICE — BLANKET BAIR HUGGER UPPER BODY 42268

## (undated) DEVICE — SENSOR-OXISENSOR II ADULT

## (undated) DEVICE — FORCEP-COLON BIOPSY LARGE W/NEEDLE 240CM

## (undated) DEVICE — CATH TRAY 16FR METER W/STATLOCK LATEX] A902916

## (undated) DEVICE — GLOVE 8.5 PROTEXIS PI CLASSIC 2D72PL85X

## (undated) DEVICE — DRESSING MEPILEX BORDER AG 3" X 3" (7.5CM X 7.5CM) 395290

## (undated) DEVICE — STPL SKIN 35W 6.9MM  PXW35

## (undated) DEVICE — OSTOMY OR SET 70MM/2.75IN BLUE 416928

## (undated) DEVICE — SU VICRYL 3-0 SH 27" UND J416H

## (undated) DEVICE — ESU ELEC BLADE 4" COATED

## (undated) DEVICE — COVER LT HANDLE 2/PK 15160-2FG

## (undated) DEVICE — IRRIGATION-H2O 1000ML

## (undated) DEVICE — SOL NACL 0.9% IRRIG 1000ML BOTTLE 2F7124

## (undated) DEVICE — CLIP-RESOLUTION CLIPPING DEVICE

## (undated) DEVICE — TUBE-DUAL SUMP 20" 111196

## (undated) DEVICE — SU SILK 2-0 TIE 12X30" A305H

## (undated) DEVICE — PACK LAPAROTOMY CUSTOM SBA32LPMBG

## (undated) DEVICE — DRAIN JACKSON PRATT RESERVOIR 100ML SU130-1305

## (undated) DEVICE — STPL LINEAR CUT 75MM TLC75

## (undated) DEVICE — CONNECTOR-ERBEFLO 2 PORT

## (undated) DEVICE — LABEL STERILE PREPRINTED FOR OR FRRH01-2M

## (undated) RX ORDER — FENTANYL CITRATE-0.9 % NACL/PF 10 MCG/ML
PLASTIC BAG, INJECTION (ML) INTRAVENOUS
Status: DISPENSED
Start: 2025-07-15

## (undated) RX ORDER — PROPOFOL 10 MG/ML
INJECTION, EMULSION INTRAVENOUS
Status: DISPENSED
Start: 2018-11-19

## (undated) RX ORDER — ONDANSETRON 2 MG/ML
INJECTION INTRAMUSCULAR; INTRAVENOUS
Status: DISPENSED
Start: 2025-07-15

## (undated) RX ORDER — PROPOFOL 10 MG/ML
INJECTION, EMULSION INTRAVENOUS
Status: DISPENSED
Start: 2025-07-15

## (undated) RX ORDER — FENTANYL CITRATE 50 UG/ML
INJECTION, SOLUTION INTRAMUSCULAR; INTRAVENOUS
Status: DISPENSED
Start: 2025-07-15

## (undated) RX ORDER — EPHEDRINE SULFATE 50 MG/ML
INJECTION, SOLUTION INTRAMUSCULAR; INTRAVENOUS; SUBCUTANEOUS
Status: DISPENSED
Start: 2025-07-15

## (undated) RX ORDER — DEXAMETHASONE SODIUM PHOSPHATE 10 MG/ML
INJECTION, SOLUTION INTRAMUSCULAR; INTRAVENOUS
Status: DISPENSED
Start: 2025-07-15